# Patient Record
Sex: FEMALE | Race: WHITE | NOT HISPANIC OR LATINO | Employment: UNEMPLOYED | URBAN - METROPOLITAN AREA
[De-identification: names, ages, dates, MRNs, and addresses within clinical notes are randomized per-mention and may not be internally consistent; named-entity substitution may affect disease eponyms.]

---

## 2019-03-20 ENCOUNTER — HISTORICAL (OUTPATIENT)
Dept: ADMINISTRATIVE | Facility: HOSPITAL | Age: 24
End: 2019-03-20

## 2019-03-20 LAB
ABS NEUT (OLG): 10.77 X10(3)/MCL (ref 2.1–9.2)
ALBUMIN SERPL-MCNC: 4.4 GM/DL (ref 3.4–5)
ALBUMIN/GLOB SERPL: 1.1 RATIO (ref 1.1–2)
ALP SERPL-CCNC: 87 UNIT/L (ref 45–117)
ALT SERPL-CCNC: 60 UNIT/L (ref 12–78)
AST SERPL-CCNC: 27 UNIT/L (ref 15–37)
BASOPHILS # BLD AUTO: 0.05 X10(3)/MCL
BASOPHILS NFR BLD AUTO: 0 %
BILIRUB SERPL-MCNC: 0.4 MG/DL (ref 0.2–1)
BILIRUBIN DIRECT+TOT PNL SERPL-MCNC: 0.1 MG/DL
BILIRUBIN DIRECT+TOT PNL SERPL-MCNC: 0.3 MG/DL
BUN SERPL-MCNC: 11 MG/DL (ref 7–18)
CALCIUM SERPL-MCNC: 9.3 MG/DL (ref 8.5–10.1)
CHLORIDE SERPL-SCNC: 105 MMOL/L (ref 98–107)
CHOLEST SERPL-MCNC: 118 MG/DL
CHOLEST/HDLC SERPL: 3.3 {RATIO} (ref 0–4.4)
CO2 SERPL-SCNC: 26 MMOL/L (ref 21–32)
CREAT SERPL-MCNC: 0.8 MG/DL (ref 0.6–1.3)
EOSINOPHIL # BLD AUTO: 0.07 X10(3)/MCL
EOSINOPHIL NFR BLD AUTO: 0 %
ERYTHROCYTE [DISTWIDTH] IN BLOOD BY AUTOMATED COUNT: 12.5 % (ref 11.5–14.5)
EST. AVERAGE GLUCOSE BLD GHB EST-MCNC: 117 MG/DL
GLOBULIN SER-MCNC: 3.9 GM/ML (ref 2.3–3.5)
GLUCOSE SERPL-MCNC: 148 MG/DL (ref 74–106)
HBA1C MFR BLD: 5.7 % (ref 4.2–6.3)
HCT VFR BLD AUTO: 43.5 % (ref 35–46)
HDLC SERPL-MCNC: 36 MG/DL
HGB BLD-MCNC: 14.2 GM/DL (ref 12–16)
IMM GRANULOCYTES # BLD AUTO: 0.1 10*3/UL
IMM GRANULOCYTES NFR BLD AUTO: 1 %
LDLC SERPL CALC-MCNC: 46 MG/DL (ref 0–130)
LYMPHOCYTES # BLD AUTO: 3.1 X10(3)/MCL
LYMPHOCYTES NFR BLD AUTO: 21 % (ref 13–40)
MCH RBC QN AUTO: 31.1 PG (ref 26–34)
MCHC RBC AUTO-ENTMCNC: 32.6 GM/DL (ref 31–37)
MCV RBC AUTO: 95.2 FL (ref 80–100)
MONOCYTES # BLD AUTO: 0.42 X10(3)/MCL
MONOCYTES NFR BLD AUTO: 3 % (ref 4–12)
NEUTROPHILS # BLD AUTO: 10.77 X10(3)/MCL
NEUTROPHILS NFR BLD AUTO: 74 X10(3)/MCL
PLATELET # BLD AUTO: 438 X10(3)/MCL (ref 130–400)
PMV BLD AUTO: 10.3 FL (ref 7.4–10.4)
POTASSIUM SERPL-SCNC: 4.2 MMOL/L (ref 3.5–5.1)
PROT SERPL-MCNC: 8.3 GM/DL (ref 6.4–8.2)
RBC # BLD AUTO: 4.57 X10(6)/MCL (ref 4–5.2)
SODIUM SERPL-SCNC: 139 MMOL/L (ref 136–145)
TRIGL SERPL-MCNC: 181 MG/DL
TSH SERPL-ACNC: 1.43 MIU/L (ref 0.36–3.74)
VLDLC SERPL CALC-MCNC: 36 MG/DL
WBC # SPEC AUTO: 14.5 X10(3)/MCL (ref 4.5–11)

## 2019-09-17 ENCOUNTER — HISTORICAL (OUTPATIENT)
Dept: ADMINISTRATIVE | Facility: HOSPITAL | Age: 24
End: 2019-09-17

## 2019-09-17 LAB
EST. AVERAGE GLUCOSE BLD GHB EST-MCNC: 123 MG/DL
HBA1C MFR BLD: 5.9 % (ref 4.2–6.3)
TSH SERPL-ACNC: 0.91 MIU/L (ref 0.36–3.74)

## 2021-07-02 ENCOUNTER — HISTORICAL (OUTPATIENT)
Dept: ADMINISTRATIVE | Facility: HOSPITAL | Age: 26
End: 2021-07-02

## 2021-07-02 LAB
ABS NEUT (OLG): 14.77 X10(3)/MCL (ref 2.1–9.2)
ALBUMIN SERPL-MCNC: 4 GM/DL (ref 3.5–5)
ALBUMIN/GLOB SERPL: 0.9 RATIO (ref 1.1–2)
ALP SERPL-CCNC: 113 UNIT/L (ref 40–150)
ALT SERPL-CCNC: 146 UNIT/L (ref 0–55)
ANISOCYTOSIS BLD QL SMEAR: NORMAL
AST SERPL-CCNC: 65 UNIT/L (ref 5–34)
BASOPHILS # BLD AUTO: 0.1 X10(3)/MCL (ref 0–0.2)
BASOPHILS NFR BLD AUTO: 1 %
BILIRUB SERPL-MCNC: 0.3 MG/DL
BILIRUBIN DIRECT+TOT PNL SERPL-MCNC: 0.1 MG/DL (ref 0–0.5)
BILIRUBIN DIRECT+TOT PNL SERPL-MCNC: 0.2 MG/DL (ref 0–0.8)
BUN SERPL-MCNC: 8.3 MG/DL (ref 7–18.7)
CALCIUM SERPL-MCNC: 10 MG/DL (ref 8.4–10.2)
CHLORIDE SERPL-SCNC: 102 MMOL/L (ref 98–107)
CO2 SERPL-SCNC: 21 MMOL/L (ref 22–29)
CREAT SERPL-MCNC: 0.79 MG/DL (ref 0.55–1.02)
EOSINOPHIL # BLD AUTO: 0.5 X10(3)/MCL (ref 0–0.9)
EOSINOPHIL NFR BLD AUTO: 3 %
ERYTHROCYTE [DISTWIDTH] IN BLOOD BY AUTOMATED COUNT: 12.9 % (ref 11.5–14.5)
GLOBULIN SER-MCNC: 4.3 GM/DL (ref 2.4–3.5)
GLUCOSE SERPL-MCNC: 304 MG/DL (ref 74–100)
HCT VFR BLD AUTO: 43.9 % (ref 35–46)
HGB BLD-MCNC: 14.3 GM/DL (ref 12–16)
IMM GRANULOCYTES # BLD AUTO: 0.25 10*3/UL
IMM GRANULOCYTES NFR BLD AUTO: 1 %
LYMPHOCYTES # BLD AUTO: 2.8 X10(3)/MCL (ref 0.6–4.6)
LYMPHOCYTES NFR BLD AUTO: 14 %
MCH RBC QN AUTO: 30.3 PG (ref 26–34)
MCHC RBC AUTO-ENTMCNC: 32.6 GM/DL (ref 31–37)
MCV RBC AUTO: 93 FL (ref 80–100)
MONOCYTES # BLD AUTO: 1.2 X10(3)/MCL (ref 0.1–1.3)
MONOCYTES NFR BLD AUTO: 6 %
NEUTROPHILS # BLD AUTO: 14.77 X10(3)/MCL (ref 2.1–9.2)
NEUTROPHILS NFR BLD AUTO: 75 %
NRBC BLD AUTO-RTO: 0 % (ref 0–0.2)
PLATELET # BLD AUTO: 318 X10(3)/MCL (ref 130–400)
PLATELET # BLD EST: ADEQUATE 10*3/UL
PMV BLD AUTO: 10.9 FL (ref 7.4–10.4)
POTASSIUM SERPL-SCNC: 4.4 MMOL/L (ref 3.5–5.1)
PROT SERPL-MCNC: 8.3 GM/DL (ref 6.4–8.3)
RBC # BLD AUTO: 4.72 X10(6)/MCL (ref 4–5.2)
SODIUM SERPL-SCNC: 137 MMOL/L (ref 136–145)
TSH SERPL-ACNC: 0.76 UIU/ML (ref 0.35–4.94)
WBC # SPEC AUTO: 19.6 X10(3)/MCL (ref 4.5–11)

## 2021-10-12 ENCOUNTER — HISTORICAL (OUTPATIENT)
Dept: ADMINISTRATIVE | Facility: HOSPITAL | Age: 26
End: 2021-10-12

## 2021-10-12 LAB
ABS NEUT (OLG): 9.21 X10(3)/MCL (ref 2.1–9.2)
ALBUMIN SERPL-MCNC: 4.2 GM/DL (ref 3.5–5)
ALBUMIN/GLOB SERPL: 1.1 RATIO (ref 1.1–2)
ALP SERPL-CCNC: 106 UNIT/L (ref 40–150)
ALT SERPL-CCNC: 110 UNIT/L (ref 0–55)
AST SERPL-CCNC: 71 UNIT/L (ref 5–34)
BASOPHILS # BLD AUTO: 0.1 X10(3)/MCL (ref 0–0.2)
BASOPHILS NFR BLD AUTO: 0 %
BILIRUB SERPL-MCNC: 0.4 MG/DL
BILIRUBIN DIRECT+TOT PNL SERPL-MCNC: 0.2 MG/DL (ref 0–0.5)
BILIRUBIN DIRECT+TOT PNL SERPL-MCNC: 0.2 MG/DL (ref 0–0.8)
BUN SERPL-MCNC: 9.1 MG/DL (ref 7–18.7)
CALCIUM SERPL-MCNC: 10 MG/DL (ref 8.4–10.2)
CHLORIDE SERPL-SCNC: 103 MMOL/L (ref 98–107)
CHOLEST SERPL-MCNC: 104 MG/DL
CHOLEST/HDLC SERPL: 4 {RATIO} (ref 0–5)
CO2 SERPL-SCNC: 24 MMOL/L (ref 22–29)
CREAT SERPL-MCNC: 0.98 MG/DL (ref 0.55–1.02)
EOSINOPHIL # BLD AUTO: 0.5 X10(3)/MCL (ref 0–0.9)
EOSINOPHIL NFR BLD AUTO: 4 %
ERYTHROCYTE [DISTWIDTH] IN BLOOD BY AUTOMATED COUNT: 12.6 % (ref 11.5–14.5)
GLOBULIN SER-MCNC: 3.8 GM/DL (ref 2.4–3.5)
GLUCOSE SERPL-MCNC: 294 MG/DL (ref 74–100)
HCT VFR BLD AUTO: 43.7 % (ref 35–46)
HDLC SERPL-MCNC: 26 MG/DL (ref 35–60)
HGB BLD-MCNC: 14.5 GM/DL (ref 12–16)
IMM GRANULOCYTES # BLD AUTO: 0.08 10*3/UL
IMM GRANULOCYTES NFR BLD AUTO: 1 %
LDLC SERPL CALC-MCNC: 33 MG/DL (ref 50–140)
LYMPHOCYTES # BLD AUTO: 2.7 X10(3)/MCL (ref 0.6–4.6)
LYMPHOCYTES NFR BLD AUTO: 21 %
MCH RBC QN AUTO: 31.1 PG (ref 26–34)
MCHC RBC AUTO-ENTMCNC: 33.2 GM/DL (ref 31–37)
MCV RBC AUTO: 93.8 FL (ref 80–100)
MONOCYTES # BLD AUTO: 0.6 X10(3)/MCL (ref 0.1–1.3)
MONOCYTES NFR BLD AUTO: 4 %
NEUTROPHILS # BLD AUTO: 9.21 X10(3)/MCL (ref 2.1–9.2)
NEUTROPHILS NFR BLD AUTO: 70 %
NRBC BLD AUTO-RTO: 0 % (ref 0–0.2)
PLATELET # BLD AUTO: 517 X10(3)/MCL (ref 130–400)
PMV BLD AUTO: 9.9 FL (ref 7.4–10.4)
POTASSIUM SERPL-SCNC: 4.5 MMOL/L (ref 3.5–5.1)
PROT SERPL-MCNC: 8 GM/DL (ref 6.4–8.3)
RBC # BLD AUTO: 4.66 X10(6)/MCL (ref 4–5.2)
SODIUM SERPL-SCNC: 138 MMOL/L (ref 136–145)
TRIGL SERPL-MCNC: 227 MG/DL (ref 37–140)
TSH SERPL-ACNC: 2.55 UIU/ML (ref 0.35–4.94)
VLDLC SERPL CALC-MCNC: 45 MG/DL
WBC # SPEC AUTO: 13.1 X10(3)/MCL (ref 4.5–11)

## 2022-04-11 ENCOUNTER — HISTORICAL (OUTPATIENT)
Dept: ADMINISTRATIVE | Facility: HOSPITAL | Age: 27
End: 2022-04-11
Payer: MEDICARE

## 2022-04-27 VITALS
WEIGHT: 115.94 LBS | HEIGHT: 57 IN | DIASTOLIC BLOOD PRESSURE: 86 MMHG | SYSTOLIC BLOOD PRESSURE: 150 MMHG | BODY MASS INDEX: 25.01 KG/M2

## 2022-05-31 PROBLEM — E04.2 MULTINODULAR GOITER: Status: ACTIVE | Noted: 2022-05-31

## 2022-05-31 PROBLEM — E89.0 POSTSURGICAL HYPOTHYROIDISM: Status: ACTIVE | Noted: 2022-05-31

## 2022-05-31 PROBLEM — R79.89 ABNORMAL LIVER FUNCTION TESTS: Status: ACTIVE | Noted: 2022-05-31

## 2022-05-31 PROBLEM — E13.9 POST-TRANSPLANT DIABETES MELLITUS: Status: ACTIVE | Noted: 2022-05-31

## 2022-05-31 PROBLEM — D72.829 LEUKOCYTOSIS: Status: ACTIVE | Noted: 2022-05-31

## 2022-05-31 PROBLEM — C73 THYROID CANCER: Status: ACTIVE | Noted: 2022-05-31

## 2022-09-21 RX ORDER — GLIPIZIDE 5 MG/1
10 TABLET ORAL 2 TIMES DAILY WITH MEALS
Qty: 120 TABLET | Refills: 1 | Status: ON HOLD | OUTPATIENT
Start: 2022-09-21 | End: 2024-01-26 | Stop reason: HOSPADM

## 2022-09-21 RX ORDER — GLIPIZIDE 5 MG/1
TABLET ORAL
COMMUNITY
Start: 2022-06-21 | End: 2022-09-21 | Stop reason: SDUPTHER

## 2022-09-27 DIAGNOSIS — R79.89 ABNORMAL LIVER FUNCTION TESTS: ICD-10-CM

## 2022-09-27 DIAGNOSIS — E89.0 POSTSURGICAL HYPOTHYROIDISM: ICD-10-CM

## 2022-09-27 DIAGNOSIS — Z85.850 HISTORY OF PAPILLARY ADENOCARCINOMA OF THYROID: ICD-10-CM

## 2022-09-27 DIAGNOSIS — E04.2 MULTINODULAR GOITER: ICD-10-CM

## 2022-09-27 DIAGNOSIS — E13.9 POST-TRANSPLANT DIABETES MELLITUS: Primary | ICD-10-CM

## 2022-09-27 RX ORDER — LEVOTHYROXINE SODIUM 112 UG/1
56 TABLET ORAL DAILY
Qty: 45 TABLET | Refills: 0 | Status: SHIPPED | OUTPATIENT
Start: 2022-09-27 | End: 2023-01-04 | Stop reason: SDUPTHER

## 2022-09-27 RX ORDER — LEVOTHYROXINE SODIUM 112 UG/1
56 TABLET ORAL DAILY
COMMUNITY
Start: 2022-06-03 | End: 2022-09-27 | Stop reason: SDUPTHER

## 2022-09-27 NOTE — TELEPHONE ENCOUNTER
Spoke to Mrs Silva and informed erx was signed, and pt will need labs and the u/s prior to the appointment. Voiced understanding.

## 2022-09-27 NOTE — TELEPHONE ENCOUNTER
----- Message from Ilana May sent at 9/27/2022  8:50 AM CDT -----  Regarding: Refill/Ultrasound  #DR STOUT/ROSALBA  Pt mother called clinic to request refill of LEVOTHORYIOD be sent to [facility] [place] - [address].  Cg also request orders for ultrasound of thyroid.  Please call Shira Velásquez (mom/Cg) @ [phone] upon completion.

## 2022-10-31 ENCOUNTER — HOSPITAL ENCOUNTER (OUTPATIENT)
Dept: RADIOLOGY | Facility: HOSPITAL | Age: 27
Discharge: HOME | End: 2022-10-31
Attending: INTERNAL MEDICINE
Payer: MEDICARE

## 2022-10-31 DIAGNOSIS — Z85.850 HISTORY OF PAPILLARY ADENOCARCINOMA OF THYROID: ICD-10-CM

## 2022-10-31 DIAGNOSIS — E89.0 POSTSURGICAL HYPOTHYROIDISM: ICD-10-CM

## 2022-10-31 PROCEDURE — 76536 US EXAM OF HEAD AND NECK: CPT | Mod: TC

## 2022-11-02 ENCOUNTER — TELEPHONE (OUTPATIENT)
Dept: ENDOCRINOLOGY | Facility: CLINIC | Age: 27
End: 2022-11-02
Payer: MEDICARE

## 2022-11-02 NOTE — TELEPHONE ENCOUNTER
Recommend adding her to the cancellation list to sort results and ongoing care.  U/s result is in her mychart if she wants to review now.

## 2022-11-02 NOTE — TELEPHONE ENCOUNTER
Phoned left voice message regarding placing patient on cancellation list and that ultrasound can be viewed on her portal.

## 2022-11-02 NOTE — TELEPHONE ENCOUNTER
----- Message from Giovana Salinas sent at 11/1/2022  2:21 PM CDT -----  Regarding: General Message  #Stout#    I called to r/s the patient's appointment her mother (Shira) stated that the patient has a virus and she could not come in. Her mother stated that she had her Ultrasound done on yesterday and she wants to know if her results are in. [phone]

## 2022-11-25 RX ORDER — LINAGLIPTIN 5 MG/1
5 TABLET, FILM COATED ORAL DAILY
COMMUNITY
Start: 2022-08-24 | End: 2022-11-25 | Stop reason: SDUPTHER

## 2022-11-26 RX ORDER — LINAGLIPTIN 5 MG/1
5 TABLET, FILM COATED ORAL DAILY
Qty: 30 TABLET | Refills: 3 | Status: SHIPPED | OUTPATIENT
Start: 2022-11-26 | End: 2023-04-03 | Stop reason: SDUPTHER

## 2022-12-20 RX ORDER — LEVOTHYROXINE SODIUM 112 UG/1
112 TABLET ORAL
Qty: 30 TABLET | Refills: 11 | OUTPATIENT
Start: 2022-12-20 | End: 2023-12-20

## 2022-12-20 NOTE — LETTER
December 28, 2022    Chiquis Velásquez  [address]  [facility] [address]             [facility] Endocrin[facility]  [address] [facility] [address]  Phone: [phone] Dear . / Mrs. Gomesaux,    We are writing you because our efforts to reach you by telephone have been unsuccessful.    Please contact our clinic at  at your earliest convenience.    We look forward to hearing from you soon.    Sincerely,  [facility]

## 2022-12-20 NOTE — TELEPHONE ENCOUNTER
Received refill request for Levothyroxine 112mcg half a tablet daily.    [facility] - 7/27/23  [facility] - 2/15/22  No show - 6/1/22  Missed/cancelled - 11/3/22  Ultra sound thyroid 10/31/22

## 2022-12-20 NOTE — TELEPHONE ENCOUNTER
Denied, pt overdue for an appt and has no showed and repeatedly canceled and now is booked out to the middle of next year.  Will need to go to urgent care vs PCP to be evaluated and we can  where we let off next visit.

## 2023-01-04 ENCOUNTER — TELEPHONE (OUTPATIENT)
Dept: ENDOCRINOLOGY | Facility: CLINIC | Age: 28
End: 2023-01-04
Payer: MEDICARE

## 2023-01-04 DIAGNOSIS — E89.0 POSTSURGICAL HYPOTHYROIDISM: ICD-10-CM

## 2023-01-04 DIAGNOSIS — Z85.850 HISTORY OF PAPILLARY ADENOCARCINOMA OF THYROID: ICD-10-CM

## 2023-01-04 RX ORDER — LEVOTHYROXINE SODIUM 112 UG/1
56 TABLET ORAL DAILY
Qty: 2 TABLET | Refills: 0 | Status: SHIPPED | OUTPATIENT
Start: 2023-01-04 | End: 2023-01-18

## 2023-01-04 NOTE — TELEPHONE ENCOUNTER
Received call from pharmacy regarding refill on Levothyroxine 112 mcg, explained of need to be seen, Mom called and scheduled appointment for Friday.  Pharmacy requesting refill, explained will need to be seen prior to refill, he requested I double check.  Do you want to send in enough to last till Friday?  Snip it from pharmacy call attached.

## 2023-01-06 ENCOUNTER — TELEPHONE (OUTPATIENT)
Dept: ENDOCRINOLOGY | Facility: CLINIC | Age: 28
End: 2023-01-06

## 2023-01-06 ENCOUNTER — OFFICE VISIT (OUTPATIENT)
Dept: ENDOCRINOLOGY | Facility: CLINIC | Age: 28
End: 2023-01-06
Payer: MEDICARE

## 2023-01-06 ENCOUNTER — LAB VISIT (OUTPATIENT)
Dept: LAB | Facility: HOSPITAL | Age: 28
End: 2023-01-06
Attending: STUDENT IN AN ORGANIZED HEALTH CARE EDUCATION/TRAINING PROGRAM
Payer: MEDICARE

## 2023-01-06 VITALS
SYSTOLIC BLOOD PRESSURE: 138 MMHG | BODY MASS INDEX: 24.81 KG/M2 | WEIGHT: 115 LBS | HEIGHT: 57 IN | RESPIRATION RATE: 21 BRPM | DIASTOLIC BLOOD PRESSURE: 74 MMHG

## 2023-01-06 DIAGNOSIS — C73 THYROID CANCER: ICD-10-CM

## 2023-01-06 DIAGNOSIS — E04.2 MULTINODULAR GOITER: ICD-10-CM

## 2023-01-06 DIAGNOSIS — Z85.850 HISTORY OF PAPILLARY ADENOCARCINOMA OF THYROID: ICD-10-CM

## 2023-01-06 DIAGNOSIS — E13.9 POST-TRANSPLANT DIABETES MELLITUS: ICD-10-CM

## 2023-01-06 DIAGNOSIS — E89.0 POSTSURGICAL HYPOTHYROIDISM: ICD-10-CM

## 2023-01-06 DIAGNOSIS — E13.9 POST-TRANSPLANT DIABETES MELLITUS: Primary | ICD-10-CM

## 2023-01-06 LAB
ALBUMIN SERPL-MCNC: 4.3 G/DL (ref 3.5–5)
ALBUMIN/GLOB SERPL: 1.3 RATIO (ref 1.1–2)
ALP SERPL-CCNC: 85 UNIT/L (ref 40–150)
ALT SERPL-CCNC: 61 UNIT/L (ref 0–55)
AST SERPL-CCNC: 32 UNIT/L (ref 5–34)
BASOPHILS # BLD AUTO: 0.07 X10(3)/MCL (ref 0–0.2)
BASOPHILS NFR BLD AUTO: 0.3 %
BILIRUBIN DIRECT+TOT PNL SERPL-MCNC: 0.7 MG/DL
BUN SERPL-MCNC: 11.5 MG/DL (ref 7–18.7)
CALCIUM SERPL-MCNC: 9.7 MG/DL (ref 8.4–10.2)
CHLORIDE SERPL-SCNC: 100 MMOL/L (ref 98–107)
CHOLEST SERPL-MCNC: 92 MG/DL
CHOLEST/HDLC SERPL: 4 {RATIO} (ref 0–5)
CO2 SERPL-SCNC: 24 MMOL/L (ref 22–29)
CREAT SERPL-MCNC: 1 MG/DL (ref 0.55–1.02)
EOSINOPHIL # BLD AUTO: 0.02 X10(3)/MCL (ref 0–0.9)
EOSINOPHIL NFR BLD AUTO: 0.1 %
ERYTHROCYTE [DISTWIDTH] IN BLOOD BY AUTOMATED COUNT: 12.4 % (ref 11–14.5)
GFR SERPLBLD CREATININE-BSD FMLA CKD-EPI: 80 MLS/MIN/1.73/M2
GLOBULIN SER-MCNC: 3.4 GM/DL (ref 2.4–3.5)
GLUCOSE SERPL-MCNC: 328 MG/DL (ref 74–100)
HBA1C MFR BLD: 8.8 %
HCT VFR BLD AUTO: 41.6 % (ref 37–47)
HDLC SERPL-MCNC: 25 MG/DL (ref 35–60)
HGB BLD-MCNC: 14.1 GM/DL (ref 12–16)
IMM GRANULOCYTES # BLD AUTO: 0.17 X10(3)/MCL (ref 0–0.04)
IMM GRANULOCYTES NFR BLD AUTO: 0.8 %
LDLC SERPL CALC-MCNC: 17 MG/DL (ref 50–140)
LYMPHOCYTES # BLD AUTO: 2.37 X10(3)/MCL (ref 0.6–4.6)
LYMPHOCYTES NFR BLD AUTO: 11.2 %
MCH RBC QN AUTO: 30.7 PG
MCHC RBC AUTO-ENTMCNC: 33.9 MG/DL (ref 33–36)
MCV RBC AUTO: 90.6 FL (ref 80–94)
MONOCYTES # BLD AUTO: 0.55 X10(3)/MCL (ref 0.1–1.3)
MONOCYTES NFR BLD AUTO: 2.6 %
NEUTROPHILS # BLD AUTO: 17.96 X10(3)/MCL (ref 2.1–9.2)
NEUTROPHILS NFR BLD AUTO: 85 %
NRBC BLD AUTO-RTO: 0 % (ref 0–1)
PLATELET # BLD AUTO: 482 X10(3)/MCL (ref 140–371)
PMV BLD AUTO: 9.9 FL (ref 9.4–12.4)
POTASSIUM SERPL-SCNC: 4.4 MMOL/L (ref 3.5–5.1)
PROT SERPL-MCNC: 7.7 GM/DL (ref 6.4–8.3)
RBC # BLD AUTO: 4.59 X10(6)/MCL (ref 4.2–5.4)
SODIUM SERPL-SCNC: 136 MMOL/L (ref 136–145)
T4 FREE SERPL-MCNC: 0.97 NG/DL (ref 0.7–1.48)
T4 SERPL-MCNC: 9.72 UG/DL (ref 4.87–11.72)
TRIGL SERPL-MCNC: 249 MG/DL (ref 37–140)
TSH SERPL-ACNC: 2.82 UIU/ML (ref 0.35–4.94)
VLDLC SERPL CALC-MCNC: 50 MG/DL
WBC # SPEC AUTO: 21.1 X10(3)/MCL (ref 4.5–11.5)

## 2023-01-06 PROCEDURE — 80053 COMPREHEN METABOLIC PANEL: CPT

## 2023-01-06 PROCEDURE — 85025 COMPLETE CBC W/AUTO DIFF WBC: CPT

## 2023-01-06 PROCEDURE — 84443 ASSAY THYROID STIM HORMONE: CPT

## 2023-01-06 PROCEDURE — 84439 ASSAY OF FREE THYROXINE: CPT

## 2023-01-06 PROCEDURE — 84432 ASSAY OF THYROGLOBULIN: CPT

## 2023-01-06 PROCEDURE — 99214 OFFICE O/P EST MOD 30 MIN: CPT | Mod: PBBFAC

## 2023-01-06 PROCEDURE — 83036 HEMOGLOBIN GLYCOSYLATED A1C: CPT | Mod: PBBFAC

## 2023-01-06 PROCEDURE — 84436 ASSAY OF TOTAL THYROXINE: CPT | Mod: XB

## 2023-01-06 PROCEDURE — 86376 MICROSOMAL ANTIBODY EACH: CPT

## 2023-01-06 PROCEDURE — 84480 ASSAY TRIIODOTHYRONINE (T3): CPT

## 2023-01-06 PROCEDURE — 36415 COLL VENOUS BLD VENIPUNCTURE: CPT

## 2023-01-06 PROCEDURE — 80061 LIPID PANEL: CPT

## 2023-01-06 RX ORDER — MYCOPHENOLATE MOFETIL 200 MG/ML
POWDER, FOR SUSPENSION ORAL
COMMUNITY
Start: 2022-03-09 | End: 2024-02-07 | Stop reason: SDUPTHER

## 2023-01-06 RX ORDER — FLUTICASONE PROPIONATE 44 UG/1
AEROSOL, METERED RESPIRATORY (INHALATION)
COMMUNITY
Start: 2022-09-14

## 2023-01-06 RX ORDER — ATORVASTATIN CALCIUM 20 MG/1
20 TABLET, FILM COATED ORAL NIGHTLY
COMMUNITY
Start: 2022-11-23

## 2023-01-06 RX ORDER — MUPIROCIN 20 MG/G
OINTMENT TOPICAL
COMMUNITY
Start: 2022-10-21

## 2023-01-06 RX ORDER — AMLODIPINE BESYLATE 5 MG/1
5 TABLET ORAL
COMMUNITY
Start: 2023-01-03

## 2023-01-06 RX ORDER — METOPROLOL TARTRATE 25 MG/1
TABLET, FILM COATED ORAL
COMMUNITY
Start: 2022-10-25

## 2023-01-06 RX ORDER — PETROLATUM,WHITE 41 %
OINTMENT (GRAM) TOPICAL
COMMUNITY

## 2023-01-06 RX ORDER — IPRATROPIUM BROMIDE 42 UG/1
2 SPRAY, METERED NASAL
COMMUNITY
Start: 2022-12-27

## 2023-01-06 RX ORDER — KETOCONAZOLE 20 MG/ML
SHAMPOO, SUSPENSION TOPICAL
COMMUNITY

## 2023-01-06 RX ORDER — ONDANSETRON HYDROCHLORIDE 4 MG/5ML
8 SOLUTION ORAL
COMMUNITY

## 2023-01-06 RX ORDER — CETIRIZINE HYDROCHLORIDE 1 MG/ML
10 SOLUTION ORAL
COMMUNITY
Start: 2022-12-13

## 2023-01-06 RX ORDER — FOLIC ACID 1 MG/1
1000 TABLET ORAL
COMMUNITY
Start: 2022-12-07

## 2023-01-06 RX ORDER — LEVOCARNITINE 1 G/10ML
500 SOLUTION ORAL 2 TIMES DAILY
COMMUNITY
Start: 2022-12-07

## 2023-01-06 RX ORDER — HYOSCYAMINE SULFATE 0.12 MG/1
0.12 TABLET, ORALLY DISINTEGRATING ORAL 2 TIMES DAILY PRN
COMMUNITY
Start: 2022-09-16

## 2023-01-06 RX ORDER — PREDNISOLONE SODIUM PHOSPHATE 15 MG/5ML
7.5 SOLUTION ORAL
COMMUNITY
Start: 2022-11-18

## 2023-01-06 RX ORDER — TACROLIMUS 0.5 MG/1
CAPSULE ORAL
COMMUNITY
Start: 2022-04-07

## 2023-01-06 RX ORDER — PEN NEEDLE, DIABETIC 30 GX3/16"
NEEDLE, DISPOSABLE MISCELLANEOUS
COMMUNITY
Start: 2022-11-10

## 2023-01-06 RX ORDER — INSULIN GLARGINE 100 [IU]/ML
10 INJECTION, SOLUTION SUBCUTANEOUS NIGHTLY
Status: ON HOLD | COMMUNITY
Start: 2022-11-10 | End: 2024-01-26 | Stop reason: HOSPADM

## 2023-01-06 RX ORDER — LANCETS 28 GAUGE
EACH MISCELLANEOUS
COMMUNITY
Start: 2022-11-10

## 2023-01-06 RX ORDER — LANSOPRAZOLE 30 MG/1
TABLET, ORALLY DISINTEGRATING, DELAYED RELEASE ORAL
COMMUNITY
Start: 2022-09-22

## 2023-01-06 RX ORDER — ASPIRIN 325 MG
325 TABLET ORAL
COMMUNITY

## 2023-01-06 NOTE — PROGRESS NOTES
[facility] Endocrinology  CLINIC NOTE    Patient Name: Chiquis Velásquez  YOB: 1995  Chief Complaint: Diabetes and Hypothyroidism     PRESENTING HISTORY   History of Present Illness:  Ms. Chiquis Velásquez is a 27 y.o. 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    Today, CBG logs with blood sugars ranging from 200-460. POC A1c of 8.8 today from 7.4. Currently on glipizide 10mg BID, tradjenta 5mg daily and 10 units of lantus at bedtime if CBG >400. Mother states that sugars are usually high in the mornings and improve in the evenings. Pt does receive continuous tube feeds at night with no recent changes to frequency or formula.     US thyroid on 10/31/22 with small left lobe nodule at 2.4 x 1.2 x 1.1cm and hypoechoic left lobe nodule. Denies     Review of Systems:  Unable to fully assess due to patient's mental status.     PAST HISTORY:       Social History     Socioeconomic History    Marital status: Single   Tobacco Use    Smoking status: Never    Smokeless tobacco: Never   Substance and Sexual Activity    Alcohol use: Never    Drug use: Never    Sexual activity: Never       MEDICATIONS:     Current Outpatient Medications   Medication Instructions    amLODIPine (NORVASC) 5 mg, Oral    aspirin 325 mg, Per G Tube    atorvastatin (LIPITOR) 20 mg, Oral, Nightly    BASAGLAR KWIKPEN U-100 INSULIN 10 Units, Subcutaneous, Nightly    cetirizine (ZYRTEC) 10 mg, Oral    FLOVENT HFA 44 mcg/actuation inhaler Inhalation    folic acid (FOLVITE) 1,000 mcg, Oral    glipiZIDE (GLUCOTROL) 10 mg, Oral, 2 times daily with meals    ipratropium (ATROVENT) 42 mcg (0.06 %) nasal spray 2 sprays, Each Nostril    ketoconazole (NIZORAL) 2 % shampoo Topical, Three times weekly    lansoprazole (PREVACID SOLUTAB) 30 MG disintegrating tablet Oral     "levocarnitine (CARNITOR) 100 mg/mL Soln 500 mg, Oral, 2 times daily    levothyroxine (SYNTHROID) 56 mcg, Oral, Daily    LEVSIN/SL 0.125 mg, Oral, 2 times daily PRN    metoprolol tartrate (LOPRESSOR) 25 MG tablet TAKE ONE TABLET BY MOUTH IN THE MORNING AND ONE TABLET BEFORE BEDTIME    mupirocin (BACTROBAN) 2 % ointment APPLY TOPICALLY THREE TIMES DAILY AS NEEDED FOR REDNESS AT PEG SITE    mycophenolate mofetil (CELLCEPT) 200 mg/mL SusR TAKE 2.5 MLS VIA PEG TWICE DAILY    ondansetron (ZOFRAN) 8 mg, Per G Tube    pen needle, diabetic 31 gauge x 3/16" Ndle Use daily    prednisoLONE (ORAPRED) 15 mg, Oral    tacrolimus (PROGRAF) 0.5 MG Cap take 2 tabs q am every MON, TUES, WED  TAKE 1 TAB Q PM ON MON, TUE, WED  TAKE 1 TAB BID Q TH, FR, SA & SUN    TRADJENTA 5 mg, Oral, Daily    TRUEPLUS PEN NEEDLE 31 gauge x 3/16" Ndle USE DAILY AS DIRECTED    white petrolatum (AQUAPHOR HEALING) 41 % Oint Topical        OBJECTIVE:   Vital Signs:  Vitals:    01/06/23 0755   BP: 138/74   Resp: (!) 21   Weight: 52.2 kg (115 lb)   Height: 4' 9" (1.448 m)        Physical Exam:  General: Non verbal in wheelchair. No acute distress  HEENT: Normocephalic, atraumatic. Face symmetric.  Cardiovascular: Regular rate & rhythm, Normal S1 & S2 w/out murmurs, rubs or gallops  Pulmonary:  On aBilateral symmetric chest rise, Non-labored  Abdominal:  Soft nondistended  Extremities: No clubbing, cyanosis or edema.  Skin:  Exposed skin is warm & dry.  Neuro:   Patient moves all extremities. Sensation intact bilateraly.      ASSESSMENT & PLAN:   History of papillary cell carcinoma s/p hemithyroidectomy  Post surgical hypothyroidism   -Continue Levothyroxine 56mcg daily  -Thyroid US done on  10/31/22 stable; rpt in 1 year  -Ordered CBC, CMP, thyroglobulin antibodies, TSH      Type 2 diabetes mellitus   -CBG log shows range from 200-460 with higher sugars in the morning and afternoon   -Pt is on continuous tube feeds at night and fasting throughout the day. "   -Currently on Glipizide 10mg BID,Tradjenta 5mg daily, and PRN lantus 10 units at night for CBG > 400.  -Metformin previously d/c'd due to h/o renal transplant.   -Recommend that patient continue current DM GDMT with the follow adjustments: Take glipizide in the evening starting with 1.5 tabs then increasing to 2 tabs. Take Tradjenta in the evening. Take lantus 10 units in the evening consistently instead of PRN and increase by 1 unit nightly with goal fasting CBG of < 130.  -POC A1C of 8.8 today        HLD (hyperlipidemia)   -ordered rpt lipid panel    -currently on Lipitor 20mg daily    RTC in 4-6 months    Marshal Zambrano MD  PGY-3, Internal Medicine    Staff addendum to follow.

## 2023-01-06 NOTE — TELEPHONE ENCOUNTER
Patient's mom returned call, informed that white count is elevated and the highest it has been in the past few draws along w/ elevated neutrophils.  Given their concern about infection, cannot rule it out and recommend either being seen today by PCP or going to urgent care.  She states she will bring Chiquis to the ER.

## 2023-01-06 NOTE — TELEPHONE ENCOUNTER
Phoned Mom (sarkis) at , dad (altagracia) at , and the home number .  Left messages at all three numbers regarding the WBC being elevated and need to be seen by PCP or Urgent Care.  Asked for call back provided clinic number, will continue to reach patient's family.

## 2023-01-06 NOTE — TELEPHONE ENCOUNTER
Labs are coming in, while awaiting the rest, ok to notify that white count is elevated and the highest it has been in the past few draws along w/ elevated neutrophils.  Given their concern about infection, cannot rule it out and recommend either being seen today by PCP or going to urgent care.  Ok to fax a copy of CBC over to PCP as well to this end.

## 2023-01-09 LAB
ENDOCRINOLOGIST REVIEW: ABNORMAL
T3 SERPL IA-MCNC: 179 NG/DL (ref 80–200)
THYROGLOB AB SERPL IA-ACNC: <1.8 IU/ML
THYROGLOB SERPL-MCNC: 7.5 NG/ML
THYROPEROXIDASE AB SERPL-ACNC: 1 IU/ML

## 2023-01-09 NOTE — TELEPHONE ENCOUNTER
Pt went to [facility] ER, Hospital Encounter notes available on chart review 01/06/2023.

## 2023-01-11 NOTE — TELEPHONE ENCOUNTER
Phoned and left voice message for mom (sarkis) letting her know we were calling to check on Chiquis and asked her to contact clinic, provided with phone number.

## 2023-01-11 NOTE — TELEPHONE ENCOUNTER
I cant tell from epic what happened/was changed, please clarify with the family then will provide further recs.

## 2023-01-13 ENCOUNTER — TELEPHONE (OUTPATIENT)
Dept: ENDOCRINOLOGY | Facility: CLINIC | Age: 28
End: 2023-01-13
Payer: MEDICARE

## 2023-01-13 DIAGNOSIS — Z85.850 HISTORY OF PAPILLARY ADENOCARCINOMA OF THYROID: ICD-10-CM

## 2023-01-13 DIAGNOSIS — E13.9 POST-TRANSPLANT DIABETES MELLITUS: Primary | ICD-10-CM

## 2023-01-13 DIAGNOSIS — E89.0 POSTSURGICAL HYPOTHYROIDISM: ICD-10-CM

## 2023-01-13 RX ORDER — LEVOTHYROXINE SODIUM 112 UG/1
112 TABLET ORAL
Qty: 30 TABLET | Refills: 11 | Status: CANCELLED | OUTPATIENT
Start: 2023-01-13 | End: 2024-01-13

## 2023-01-13 NOTE — TELEPHONE ENCOUNTER
Mom phoned states Chiquis is much better, her left ear is now clear, was discharged Sunday afternoon.Dr. Prater is monitoring her right ear.      Mom requesting refill on the [address]

## 2023-01-13 NOTE — TELEPHONE ENCOUNTER
Phoned patient's mom Shira at , left voice message letting her know I was calling to check up on kirstin and asked her to contact clinic.  Number provided.

## 2023-01-13 NOTE — TELEPHONE ENCOUNTER
----- Message from Ilana May sent at 1/12/2023 12:28 PM CST -----  Regarding: pt advice  Dr RAYMOND/ROSALBA Velásquez returning call. Please contact pt mother @  [phone]

## 2023-01-13 NOTE — TELEPHONE ENCOUNTER
----- Message from Magalys Hernandez sent at 1/13/2023 11:10 AM CST -----  Regarding: Office call  #STOROBBY Pinto from Dr. Campbell Prater office called needing pt last chart notes and labs. Pt is in their office now.     Office fax #: [phone]

## 2023-01-17 NOTE — TELEPHONE ENCOUNTER
Labs show her Lt4 dose was not at goal and otherwise that we should continue to follow her u/s findings and tg levels.    Make changes for DM as per visit, erx increased LT4 1/2 125mcg tab po daily up from current 1/2 112mcg tab, #45, 2 refills.      TSH, tg panel, hga1c prior to f/u.

## 2023-01-18 RX ORDER — LEVOTHYROXINE SODIUM 125 UG/1
TABLET ORAL
Qty: 45 TABLET | Refills: 2 | Status: SHIPPED | OUTPATIENT
Start: 2023-01-18 | End: 2023-10-23 | Stop reason: SDUPTHER

## 2023-01-19 NOTE — TELEPHONE ENCOUNTER
Left voice message letting patient's mom  know of dose increase and of need for labs prior to appointment.

## 2023-04-03 DIAGNOSIS — E13.9 POST-TRANSPLANT DIABETES MELLITUS: Primary | ICD-10-CM

## 2023-04-03 RX ORDER — LINAGLIPTIN 5 MG/1
5 TABLET, FILM COATED ORAL DAILY
Qty: 30 TABLET | Refills: 3 | Status: SHIPPED | OUTPATIENT
Start: 2023-04-03 | End: 2023-08-07 | Stop reason: SDUPTHER

## 2023-04-03 NOTE — TELEPHONE ENCOUNTER
Last visit in [facility] ENDOCRINOLOGY: 1/6/2023    Patient's next visit in [facility] ENDOCRINOLOGY: 7/6/2023     Refill request faxed from [facility].

## 2023-07-19 ENCOUNTER — TELEPHONE (OUTPATIENT)
Dept: ENDOCRINOLOGY | Facility: CLINIC | Age: 28
End: 2023-07-19
Payer: MEDICARE

## 2023-07-19 NOTE — TELEPHONE ENCOUNTER
----- Message from Chanda Whitaker sent at 7/19/2023  8:19 AM CDT -----  Regarding: Request from PCP  STOUT PT         Pt's PCP, Dr. Campbell Prater is requesting the patients most recent A1C.   Fax # [phone]

## 2023-08-07 DIAGNOSIS — E13.9 POST-TRANSPLANT DIABETES MELLITUS: ICD-10-CM

## 2023-08-07 RX ORDER — LINAGLIPTIN 5 MG/1
5 TABLET, FILM COATED ORAL DAILY
Qty: 30 TABLET | Refills: 3 | Status: SHIPPED | OUTPATIENT
Start: 2023-08-07 | End: 2023-12-04 | Stop reason: SDUPTHER

## 2023-08-07 NOTE — TELEPHONE ENCOUNTER
Last visit in [facility] ENDOCRINOLOGY: 1/26/23  Canceled by us: 7/6/23  Patient's next visit in [facility] ENDOCRINOLOGY: 10/27/2023

## 2023-10-02 ENCOUNTER — TELEPHONE (OUTPATIENT)
Dept: ENDOCRINOLOGY | Facility: CLINIC | Age: 28
End: 2023-10-02
Payer: MEDICARE

## 2023-10-02 NOTE — TELEPHONE ENCOUNTER
----- Message from Sasha Camejo sent at 10/2/2023  1:30 PM CDT -----  Patient of Dr Josh Aguilar with Dr Prater's office  requesting results of last A1C documented    (O) [phone]  (F) [phone]    Thanks

## 2023-10-23 DIAGNOSIS — E89.0 POSTSURGICAL HYPOTHYROIDISM: ICD-10-CM

## 2023-10-23 DIAGNOSIS — C73 THYROID CANCER: Primary | ICD-10-CM

## 2023-10-23 NOTE — TELEPHONE ENCOUNTER
Received refill request from pharmacy      Last visit in [facility] ENDOCRINOLOGY: 01/06/2023  Canceled by us: 07/06/23  Patient's next visit in [facility] ENDOCRINOLOGY: 10/27/2023

## 2023-10-24 RX ORDER — LEVOTHYROXINE SODIUM 125 UG/1
TABLET ORAL
Qty: 4 TABLET | Refills: 0 | Status: SHIPPED | OUTPATIENT
Start: 2023-10-24

## 2023-12-04 DIAGNOSIS — E13.9 POST-TRANSPLANT DIABETES MELLITUS: ICD-10-CM

## 2023-12-04 NOTE — TELEPHONE ENCOUNTER
Received refill request from pharmacy.       Last visit in [facility] ENDOCRINOLOGY: 01/06/2023  Canceled: 07/06/23, 07/27/23  No show: 10/27/2023  Patient's next visit in [facility] ENDOCRINOLOGY: Not scheduled.    headache, chest pain, htn and dizziness starting this morning

## 2023-12-07 RX ORDER — LINAGLIPTIN 5 MG/1
5 TABLET, FILM COATED ORAL DAILY
Qty: 30 TABLET | Refills: 0 | Status: SHIPPED | OUTPATIENT
Start: 2023-12-07 | End: 2024-02-07

## 2024-01-19 ENCOUNTER — OFFICE VISIT (OUTPATIENT)
Dept: ENDOCRINOLOGY | Facility: CLINIC | Age: 29
DRG: 638 | End: 2024-01-19
Payer: MEDICARE

## 2024-01-19 ENCOUNTER — TELEPHONE (OUTPATIENT)
Dept: ENDOCRINOLOGY | Facility: CLINIC | Age: 29
End: 2024-01-19

## 2024-01-19 ENCOUNTER — HOSPITAL ENCOUNTER (INPATIENT)
Facility: HOSPITAL | Age: 29
LOS: 1 days | Discharge: HOME | DRG: 638 | End: 2024-01-20
Attending: INTERNAL MEDICINE | Admitting: INTERNAL MEDICINE
Payer: MEDICARE

## 2024-01-19 VITALS
DIASTOLIC BLOOD PRESSURE: 84 MMHG | WEIGHT: 115 LBS | RESPIRATION RATE: 16 BRPM | BODY MASS INDEX: 24.89 KG/M2 | HEART RATE: 144 BPM | SYSTOLIC BLOOD PRESSURE: 132 MMHG

## 2024-01-19 DIAGNOSIS — E11.65 UNCONTROLLED TYPE 2 DIABETES MELLITUS WITH HYPERGLYCEMIA: Primary | ICD-10-CM

## 2024-01-19 DIAGNOSIS — R00.2 PALPITATIONS: ICD-10-CM

## 2024-01-19 DIAGNOSIS — R00.0 SINUS TACHYCARDIA: ICD-10-CM

## 2024-01-19 DIAGNOSIS — E13.9 POST-TRANSPLANT DIABETES MELLITUS: Primary | ICD-10-CM

## 2024-01-19 DIAGNOSIS — E03.9 HYPOTHYROIDISM (ACQUIRED): ICD-10-CM

## 2024-01-19 PROBLEM — E11.00 HYPEROSMOLAR HYPERGLYCEMIC STATE (HHS): Status: ACTIVE | Noted: 2024-01-19

## 2024-01-19 LAB
ALBUMIN SERPL-MCNC: 3.5 G/DL (ref 3.5–5)
ALBUMIN/GLOB SERPL: 0.9 RATIO (ref 1.1–2)
ALP SERPL-CCNC: 93 UNIT/L (ref 40–150)
ALT SERPL-CCNC: 31 UNIT/L (ref 0–55)
APPEARANCE UR: CLEAR
AST SERPL-CCNC: 35 UNIT/L (ref 5–34)
B-HCG UR QL: NEGATIVE
B-OH-BUTYR SERPL-MCNC: 0.9 MMOL/L
BACTERIA #/AREA URNS AUTO: ABNORMAL /HPF
BASE EXCESS BLD CALC-SCNC: 0.5 MMOL/L
BASOPHILS # BLD AUTO: 0.04 X10(3)/MCL
BASOPHILS NFR BLD AUTO: 0.2 %
BILIRUB SERPL-MCNC: 0.4 MG/DL
BILIRUB UR QL STRIP.AUTO: NEGATIVE
BLOOD GAS SAMPLE TYPE (OHS): ABNORMAL
BUN SERPL-MCNC: 14.4 MG/DL (ref 7–18.7)
CALCIUM SERPL-MCNC: 9.5 MG/DL (ref 8.4–10.2)
CHLORIDE SERPL-SCNC: 100 MMOL/L (ref 98–107)
CO2 BLDA-SCNC: 25.7 MMOL/L
CO2 SERPL-SCNC: 20 MMOL/L (ref 22–29)
COHGB MFR BLDA: 3.7 %
COLOR UR AUTO: COLORLESS
CREAT SERPL-MCNC: 0.97 MG/DL (ref 0.55–1.02)
CTP QC/QA: YES
EOSINOPHIL # BLD AUTO: 0 X10(3)/MCL (ref 0–0.9)
EOSINOPHIL NFR BLD AUTO: 0 %
ERYTHROCYTE [DISTWIDTH] IN BLOOD BY AUTOMATED COUNT: 12.1 % (ref 11.5–17)
GFR SERPLBLD CREATININE-BSD FMLA CKD-EPI: >60 MLS/MIN/1.73/M2
GLOBULIN SER-MCNC: 4.1 GM/DL (ref 2.4–3.5)
GLUCOSE SERPL-MCNC: 545 MG/DL (ref 74–100)
GLUCOSE UR QL STRIP.AUTO: ABNORMAL
HBA1C MFR BLD: 10.2 %
HCO3 BLDA-SCNC: 24.6 MMOL/L
HCT VFR BLD AUTO: 38.4 % (ref 37–47)
HGB BLD-MCNC: 12.7 G/DL (ref 12–16)
HOLD SPECIMEN: NORMAL
HYALINE CASTS #/AREA URNS LPF: ABNORMAL /LPF
IMM GRANULOCYTES # BLD AUTO: 0.14 X10(3)/MCL (ref 0–0.04)
IMM GRANULOCYTES NFR BLD AUTO: 0.8 %
INHALED O2 CONCENTRATION: 21 %
KETONES UR QL STRIP.AUTO: NEGATIVE
LACTATE SERPL-SCNC: 2.1 MMOL/L (ref 0.5–2.2)
LACTATE SERPL-SCNC: 2.3 MMOL/L (ref 0.5–2.2)
LEUKOCYTE ESTERASE UR QL STRIP.AUTO: NEGATIVE
LYMPHOCYTES # BLD AUTO: 1.11 X10(3)/MCL (ref 0.6–4.6)
LYMPHOCYTES NFR BLD AUTO: 6.2 %
MAGNESIUM SERPL-MCNC: 1.6 MG/DL (ref 1.6–2.6)
MCH RBC QN AUTO: 30.6 PG (ref 27–31)
MCHC RBC AUTO-ENTMCNC: 33.1 G/DL (ref 33–36)
MCV RBC AUTO: 92.5 FL (ref 80–94)
METHGB MFR BLDA: 1 %
MONOCYTES # BLD AUTO: 0.45 X10(3)/MCL (ref 0.1–1.3)
MONOCYTES NFR BLD AUTO: 2.5 %
NEUTROPHILS # BLD AUTO: 16.28 X10(3)/MCL (ref 2.1–9.2)
NEUTROPHILS NFR BLD AUTO: 90.3 %
NITRITE UR QL STRIP.AUTO: NEGATIVE
NRBC BLD AUTO-RTO: 0 %
O2 HB BLOOD GAS (OHS): 95.1 %
OXYHGB MFR BLDA: 13.5 G/DL
PCO2 BLDA: 37 MMHG (ref 40–50)
PH BLDA: 7.43 [PH] (ref 7.3–7.4)
PH UR STRIP.AUTO: 7.5 [PH]
PHOSPHATE SERPL-MCNC: 3 MG/DL (ref 2.3–4.7)
PLATELET # BLD AUTO: 544 X10(3)/MCL (ref 130–400)
PMV BLD AUTO: 10 FL (ref 7.4–10.4)
PO2 BLDA: 169 MMHG (ref 30–40)
POCT GLUCOSE: 101 MG/DL (ref 70–110)
POCT GLUCOSE: 369 MG/DL (ref 70–110)
POCT GLUCOSE: 450 MG/DL (ref 70–110)
POCT GLUCOSE: 96 MG/DL (ref 70–110)
POTASSIUM SERPL-SCNC: 5 MMOL/L (ref 3.5–5.1)
PROT SERPL-MCNC: 7.6 GM/DL (ref 6.4–8.3)
PROT UR QL STRIP.AUTO: NEGATIVE
RBC # BLD AUTO: 4.15 X10(6)/MCL (ref 4.2–5.4)
RBC #/AREA URNS AUTO: ABNORMAL /HPF
RBC UR QL AUTO: NEGATIVE
SAO2 % BLDA: 99.8 %
SODIUM SERPL-SCNC: 134 MMOL/L (ref 136–145)
SP GR UR STRIP.AUTO: <1.005 (ref 1–1.03)
SQUAMOUS #/AREA URNS LPF: ABNORMAL /HPF
TSH SERPL-ACNC: 0.49 UIU/ML (ref 0.35–4.94)
UROBILINOGEN UR STRIP-ACNC: NORMAL
WBC # SPEC AUTO: 18.02 X10(3)/MCL (ref 4.5–11.5)
WBC #/AREA URNS AUTO: ABNORMAL /HPF

## 2024-01-19 PROCEDURE — 99900035 HC TECH TIME PER 15 MIN (STAT)

## 2024-01-19 PROCEDURE — 63600175 PHARM REV CODE 636 W HCPCS

## 2024-01-19 PROCEDURE — 85025 COMPLETE CBC W/AUTO DIFF WBC: CPT | Performed by: INTERNAL MEDICINE

## 2024-01-19 PROCEDURE — 96374 THER/PROPH/DIAG INJ IV PUSH: CPT

## 2024-01-19 PROCEDURE — 83036 HEMOGLOBIN GLYCOSYLATED A1C: CPT | Mod: PBBFAC

## 2024-01-19 PROCEDURE — 21400001 HC TELEMETRY ROOM

## 2024-01-19 PROCEDURE — 82010 KETONE BODYS QUAN: CPT | Performed by: INTERNAL MEDICINE

## 2024-01-19 PROCEDURE — 99213 OFFICE O/P EST LOW 20 MIN: CPT | Mod: PBBFAC

## 2024-01-19 PROCEDURE — 82962 GLUCOSE BLOOD TEST: CPT

## 2024-01-19 PROCEDURE — 83036 HEMOGLOBIN GLYCOSYLATED A1C: CPT

## 2024-01-19 PROCEDURE — 25000003 PHARM REV CODE 250

## 2024-01-19 PROCEDURE — 81025 URINE PREGNANCY TEST: CPT | Performed by: INTERNAL MEDICINE

## 2024-01-19 PROCEDURE — 96372 THER/PROPH/DIAG INJ SC/IM: CPT | Performed by: INTERNAL MEDICINE

## 2024-01-19 PROCEDURE — 87077 CULTURE AEROBIC IDENTIFY: CPT | Performed by: INTERNAL MEDICINE

## 2024-01-19 PROCEDURE — 82803 BLOOD GASES ANY COMBINATION: CPT

## 2024-01-19 PROCEDURE — 80053 COMPREHEN METABOLIC PANEL: CPT | Performed by: INTERNAL MEDICINE

## 2024-01-19 PROCEDURE — 99285 EMERGENCY DEPT VISIT HI MDM: CPT | Mod: 25,27

## 2024-01-19 PROCEDURE — 81001 URINALYSIS AUTO W/SCOPE: CPT | Performed by: INTERNAL MEDICINE

## 2024-01-19 PROCEDURE — 84443 ASSAY THYROID STIM HORMONE: CPT | Performed by: INTERNAL MEDICINE

## 2024-01-19 PROCEDURE — 96361 HYDRATE IV INFUSION ADD-ON: CPT

## 2024-01-19 PROCEDURE — 93005 ELECTROCARDIOGRAM TRACING: CPT

## 2024-01-19 PROCEDURE — 87040 BLOOD CULTURE FOR BACTERIA: CPT | Performed by: INTERNAL MEDICINE

## 2024-01-19 PROCEDURE — 83605 ASSAY OF LACTIC ACID: CPT | Performed by: INTERNAL MEDICINE

## 2024-01-19 PROCEDURE — 63600175 PHARM REV CODE 636 W HCPCS: Performed by: INTERNAL MEDICINE

## 2024-01-19 PROCEDURE — 84100 ASSAY OF PHOSPHORUS: CPT | Performed by: INTERNAL MEDICINE

## 2024-01-19 PROCEDURE — 83735 ASSAY OF MAGNESIUM: CPT | Performed by: INTERNAL MEDICINE

## 2024-01-19 PROCEDURE — 87154 CUL TYP ID BLD PTHGN 6+ TRGT: CPT | Performed by: INTERNAL MEDICINE

## 2024-01-19 RX ORDER — LEVOCARNITINE 1 G/10ML
500 SOLUTION ORAL 2 TIMES DAILY
Status: DISCONTINUED | OUTPATIENT
Start: 2024-01-19 | End: 2024-01-20 | Stop reason: HOSPADM

## 2024-01-19 RX ORDER — IBUPROFEN 200 MG
16 TABLET ORAL
Status: DISCONTINUED | OUTPATIENT
Start: 2024-01-19 | End: 2024-01-20 | Stop reason: HOSPADM

## 2024-01-19 RX ORDER — FOLIC ACID 1 MG/1
1000 TABLET ORAL DAILY
Status: DISCONTINUED | OUTPATIENT
Start: 2024-01-20 | End: 2024-01-20 | Stop reason: HOSPADM

## 2024-01-19 RX ORDER — GLUCAGON 1 MG
1 KIT INJECTION
Status: DISCONTINUED | OUTPATIENT
Start: 2024-01-19 | End: 2024-01-20 | Stop reason: HOSPADM

## 2024-01-19 RX ORDER — IBUPROFEN 200 MG
24 TABLET ORAL
Status: DISCONTINUED | OUTPATIENT
Start: 2024-01-19 | End: 2024-01-20 | Stop reason: HOSPADM

## 2024-01-19 RX ORDER — SODIUM CHLORIDE, SODIUM LACTATE, POTASSIUM CHLORIDE, CALCIUM CHLORIDE 600; 310; 30; 20 MG/100ML; MG/100ML; MG/100ML; MG/100ML
1000 INJECTION, SOLUTION INTRAVENOUS
Status: COMPLETED | OUTPATIENT
Start: 2024-01-19 | End: 2024-01-19

## 2024-01-19 RX ORDER — INSULIN ASPART 100 [IU]/ML
0-10 INJECTION, SOLUTION INTRAVENOUS; SUBCUTANEOUS
Status: DISCONTINUED | OUTPATIENT
Start: 2024-01-19 | End: 2024-01-20 | Stop reason: HOSPADM

## 2024-01-19 RX ORDER — TACROLIMUS 0.5 MG/1
0.5 CAPSULE ORAL EVERY MORNING
Status: DISCONTINUED | OUTPATIENT
Start: 2024-01-19 | End: 2024-01-20 | Stop reason: HOSPADM

## 2024-01-19 RX ORDER — ATORVASTATIN CALCIUM 20 MG/1
20 TABLET, FILM COATED ORAL NIGHTLY
Status: DISCONTINUED | OUTPATIENT
Start: 2024-01-19 | End: 2024-01-20 | Stop reason: HOSPADM

## 2024-01-19 RX ORDER — ALBUTEROL SULFATE 90 UG/1
2 AEROSOL, METERED RESPIRATORY (INHALATION) EVERY 6 HOURS PRN
Status: DISCONTINUED | OUTPATIENT
Start: 2024-01-19 | End: 2024-01-20 | Stop reason: HOSPADM

## 2024-01-19 RX ORDER — TALC
6 POWDER (GRAM) TOPICAL NIGHTLY PRN
Status: DISCONTINUED | OUTPATIENT
Start: 2024-01-19 | End: 2024-01-20 | Stop reason: HOSPADM

## 2024-01-19 RX ORDER — ALBUTEROL SULFATE 90 UG/1
2 AEROSOL, METERED RESPIRATORY (INHALATION) EVERY 6 HOURS PRN
COMMUNITY

## 2024-01-19 RX ORDER — SODIUM CHLORIDE 0.9 % (FLUSH) 0.9 %
10 SYRINGE (ML) INJECTION
Status: DISCONTINUED | OUTPATIENT
Start: 2024-01-19 | End: 2024-01-20 | Stop reason: HOSPADM

## 2024-01-19 RX ORDER — ASPIRIN 325 MG
325 TABLET ORAL ONCE
Status: DISCONTINUED | OUTPATIENT
Start: 2024-01-19 | End: 2024-01-20 | Stop reason: HOSPADM

## 2024-01-19 RX ORDER — AMLODIPINE BESYLATE 5 MG/1
5 TABLET ORAL DAILY
Status: DISCONTINUED | OUTPATIENT
Start: 2024-01-20 | End: 2024-01-20 | Stop reason: HOSPADM

## 2024-01-19 RX ORDER — INSULIN ASPART 100 [IU]/ML
4 INJECTION, SOLUTION INTRAVENOUS; SUBCUTANEOUS
Status: COMPLETED | OUTPATIENT
Start: 2024-01-19 | End: 2024-01-19

## 2024-01-19 RX ORDER — AZELASTINE 1 MG/ML
SPRAY, METERED NASAL
COMMUNITY
Start: 2023-03-30

## 2024-01-19 RX ORDER — SODIUM CHLORIDE, SODIUM LACTATE, POTASSIUM CHLORIDE, CALCIUM CHLORIDE 600; 310; 30; 20 MG/100ML; MG/100ML; MG/100ML; MG/100ML
INJECTION, SOLUTION INTRAVENOUS CONTINUOUS
Status: DISCONTINUED | OUTPATIENT
Start: 2024-01-19 | End: 2024-01-20 | Stop reason: HOSPADM

## 2024-01-19 RX ORDER — METOPROLOL TARTRATE 25 MG/1
25 TABLET, FILM COATED ORAL 2 TIMES DAILY
Status: DISCONTINUED | OUTPATIENT
Start: 2024-01-19 | End: 2024-01-20 | Stop reason: HOSPADM

## 2024-01-19 RX ORDER — MYCOPHENOLATE MOFETIL 200 MG/ML
500 POWDER, FOR SUSPENSION ORAL 2 TIMES DAILY
Status: DISCONTINUED | OUTPATIENT
Start: 2024-01-19 | End: 2024-01-20 | Stop reason: HOSPADM

## 2024-01-19 RX ORDER — TRIAMCINOLONE ACETONIDE 0.25 MG/G
CREAM TOPICAL 2 TIMES DAILY PRN
COMMUNITY

## 2024-01-19 RX ADMIN — SODIUM CHLORIDE, POTASSIUM CHLORIDE, SODIUM LACTATE AND CALCIUM CHLORIDE 1000 ML: 600; 310; 30; 20 INJECTION, SOLUTION INTRAVENOUS at 01:01

## 2024-01-19 RX ADMIN — SODIUM CHLORIDE, POTASSIUM CHLORIDE, SODIUM LACTATE AND CALCIUM CHLORIDE: 600; 310; 30; 20 INJECTION, SOLUTION INTRAVENOUS at 07:01

## 2024-01-19 RX ADMIN — METOPROLOL TARTRATE 25 MG: 25 TABLET, FILM COATED ORAL at 10:01

## 2024-01-19 RX ADMIN — LEVOCARNITINE ORAL SOLUTION 500 MG: 1 SOLUTION ORAL at 10:01

## 2024-01-19 RX ADMIN — HUMAN INSULIN 6 UNITS: 100 INJECTION, SOLUTION SUBCUTANEOUS at 01:01

## 2024-01-19 RX ADMIN — INSULIN ASPART 4 UNITS: 100 INJECTION, SOLUTION INTRAVENOUS; SUBCUTANEOUS at 01:01

## 2024-01-19 RX ADMIN — MYCOPHENOLATE MOFETIL 500 MG: 200 POWDER, FOR SUSPENSION ORAL at 10:01

## 2024-01-19 RX ADMIN — TACROLIMUS 0.5 MG: 0.5 CAPSULE ORAL at 10:01

## 2024-01-19 RX ADMIN — ATORVASTATIN CALCIUM 20 MG: 20 TABLET, FILM COATED ORAL at 10:01

## 2024-01-19 RX ADMIN — SODIUM CHLORIDE, POTASSIUM CHLORIDE, SODIUM LACTATE AND CALCIUM CHLORIDE 1000 ML: 600; 310; 30; 20 INJECTION, SOLUTION INTRAVENOUS at 12:01

## 2024-01-19 RX ADMIN — INSULIN ASPART 0.02 UNITS: 100 INJECTION, SOLUTION INTRAVENOUS; SUBCUTANEOUS at 11:01

## 2024-01-19 NOTE — PROGRESS NOTES
[facility] END[facility] LINIC NOTE    Patient name: Chiquis Velásquez  YOB: 1995   MRN: [id number]  Appointment date: 1/19/2024  Appointment time: 09:25 AM    Subjective     HPI: Chiquis Velásquez is a 28 y.o.  American female with PMH positive for hypertension, hyperlipidemia, diabetes mellitus due to immunosuppressive medications, post surgical hypothyroidism 2/2 hemithyroidectomy due to papillary thyroid carcinoma, renal transplant 2/2 depakote exposure in utero, mental delay, who presented to endocrinology clinic today for follow up appointment.     Patient upon presentation today was noticeably uncomfortable, tearful, unable to sit still, moaning and groaning but unable to localize any pain due patient being nonverbal at baseline. Vitals showed tachycardia with  and /84. Repeat vitals 10 minutes later showed  and /85. Patient caregivers reports patient baseline heart rate is approximately 90s-100s. Patient was recently discharged from [facility] for similar issues with  at that time which approximates caregivers report. Referred patient and family to ED at that time for evaluation. Suspect volume depletion versus constipation at this time but presentation warrants further work up.    Past Medical History:  Past Medical History:   Diagnosis Date    Cancer     Diabetes mellitus, type 2     Hypothyroidism     Kidney transplanted     Stroke      Past Surgical History:  History reviewed. No pertinent surgical history.  Family History:  Family History   Adopted: Yes     Social History:  Social History     Tobacco Use    Smoking status: Never    Smokeless tobacco: Never   Substance Use Topics    Alcohol use: Never    Drug use: Never     Allergies:  Review of patient's allergies indicates:   Allergen Reactions    Azithromycin      interaction with anti-rejection rx    Clarithromycin      interaction with anti rejection rx    Loperamide Other (See Comments)     Drug  drug interaction    Milk      diarrhea    Amoxicillin-pot clavulanate Diarrhea     diarrhea     Home Medications:  Prior to Admission medications    Medication Sig Start Date End Date Taking? Authorizing Provider   amLODIPine (NORVASC) 5 MG tablet Take 5 mg by mouth. 1/3/23   Provider, Historical   aspirin 325 MG tablet 325 mg by Per G Tube route.    Provider, Historical   atorvastatin (LIPITOR) 20 MG tablet Take 20 mg by mouth every evening. 11/23/22   Provider, Historical   BASAGLAR KWIKPEN U-100 INSULIN glargine 100 units/mL SubQ pen Inject 10 Units into the skin every evening. 11/10/22   Provider, Historical   cetirizine (ZYRTEC) 1 mg/mL syrup Take 10 mg by mouth. 12/13/22   Provider, Historical   FLOVENT HFA 44 mcg/actuation inhaler Inhale into the lungs. 9/14/22   Provider, Historical   folic acid (FOLVITE) 1 MG tablet Take 1,000 mcg by mouth. 12/7/22   Provider, Historical   glipiZIDE (GLUCOTROL) 5 MG tablet Take 2 tablets (10 mg total) by mouth 2 (two) times daily with meals. 9/21/22   Shan Moreno MD   ipratropium (ATROVENT) 42 mcg (0.06 %) nasal spray 2 sprays by Each Nostril route. 12/27/22   Provider, Historical   ketoconazole (NIZORAL) 2 % shampoo Apply topically 3 (three) times a week.    Provider, Historical   lansoprazole (PREVACID SOLUTAB) 30 MG disintegrating tablet Take by mouth. 9/22/22   Provider, Historical   levocarnitine (CARNITOR) 100 mg/mL Soln Take 500 mg by mouth 2 (two) times daily. 12/7/22   Provider, Historical   levothyroxine (SYNTHROID) 125 MCG tablet Take half of a 125mcg (62.5mcg) tablet daily. 10/24/23   Shan Moreno MD   LEVSIN/SL 0.125 mg Subl Take 0.125 mg by mouth 2 (two) times daily as needed. 9/16/22   Provider, Historical   metoprolol tartrate (LOPRESSOR) 25 MG tablet TAKE ONE TABLET BY MOUTH IN THE MORNING AND ONE TABLET BEFORE BEDTIME 10/25/22   Provider, Historical   mupirocin (BACTROBAN) 2 % ointment APPLY TOPICALLY THREE TIMES DAILY AS NEEDED FOR REDNESS AT  "PEG SITE 10/21/22   Provider, Historical   mycophenolate mofetil (CELLCEPT) 200 mg/mL SusR TAKE 2.5 MLS VIA PEG TWICE DAILY 3/9/22   Provider, Historical   ondansetron (ZOFRAN) 4 mg/5 mL solution 8 mg by Per G Tube route.    Provider, Historical   pen needle, diabetic 31 gauge x 3/16" Ndle Use daily 11/10/22   Provider, Historical   prednisoLONE (ORAPRED) 15 mg/5 mL (3 mg/mL) solution Take 15 mg by mouth. 11/18/22   Provider, Historical   tacrolimus (PROGRAF) 0.5 MG Cap take 2 tabs q am every MON, TUES, WED  TAKE 1 TAB Q PM ON MON, TUE, WED  TAKE 1 TAB BID Q TH, FR, SA & SUN 4/7/22   Provider, Historical   TRADJENTA 5 mg Tab tablet Take 1 tablet (5 mg total) by mouth once daily. 12/7/23 1/6/24  Shan Moreno MD   TRUEPLUS PEN NEEDLE 31 gauge x 3/16" Ndle USE DAILY AS DIRECTED 11/10/22   Provider, Historical   white petrolatum (AQUAPHOR HEALING) 41 % Oint Apply topically.    Provider, Historical     Review of Systems:  Review of Systems   Unable to perform ROS: Patient nonverbal   Constitutional:  Negative for chills, diaphoresis, fatigue and fever.   HENT:  Negative for ear pain, hearing loss, rhinorrhea, sore throat, tinnitus and trouble swallowing.    Eyes:  Negative for pain, redness and visual disturbance.   Respiratory:  Negative for cough, chest tightness and shortness of breath.    Cardiovascular:  Negative for chest pain and palpitations.   Gastrointestinal:  Negative for abdominal pain, constipation, diarrhea, nausea and vomiting.   Genitourinary:  Negative for difficulty urinating, dysuria, frequency, hematuria and urgency.   Musculoskeletal:  Negative for arthralgias and myalgias.   Skin:  Negative for rash and wound.   Neurological:  Negative for dizziness, seizures, syncope, weakness, light-headedness, numbness and headaches.   Psychiatric/Behavioral:  Negative for confusion.        Objective     Vitals:   Vitals:    01/19/24 1005   BP: 132/84   Pulse: (!) 144   Resp:        Physical Examination: "   Physical Exam    PREVENTIVE CARE:  Lab Work:    DM (age>45 or HTN):  Lab Results   Component Value Date    HGBA1C 8.8 (H) 10/06/2022    QOSZYVK8E 10.2 01/19/2024     Lipid :  Lab Results   Component Value Date    CHOL 121 12/22/2023    TRIG 341 (H) 12/22/2023    HDL 35 (L) 12/22/2023    LDL 17.00 (L) 01/06/2023     Thyroid:  Lab Results   Component Value Date    TSH 2.360 03/27/2023    T4 9.72 01/06/2023     ASSESSMENT/PLAN:  Patient referred to directly to ED for evaluation. Visit unable to be completed at this time.    History of papillary cell carcinoma s/p hemithyroidectomy (the following information is from visit prior)  Post surgical hypothyroidism   -thyroid ultrasound (10/31/2023) showed surgically absent right thyroid lobe and small 5-6 mm hypoechoic nodule in left thyroid lobe  -will repeat thyroid ultrasound to monitor nodule  -continue levothyroxine daily     Type 2 diabetes mellitus (the following information is from visit prior)  -CBG log shows range from 200-460 with higher sugars in the morning and afternoon   -Pt is on continuous tube feeds at night and fasting throughout the day.   -Currently on Glipizide 10mg BID,Tradjenta 5mg daily, and PRN lantus 10 units at night for CBG > 400.  -Metformin previously d/c'd due to h/o renal transplant.   -Recommend that patient continue current DM GDMT with the follow adjustments: Take glipizide in the evening starting with 1.5 tabs then increasing to 2 tabs. Take Tradjenta in the evening. Take lantus 10 units in the evening consistently instead of PRN and increase by 1 unit nightly with goal fasting CBG of < 130.    Follow-up in as early as possible for follow up and continued management of uncontrolled diabetes mellitus.    No future appointments.    Danilo Orona MD  [employer] Internal Medicine PGY-1

## 2024-01-19 NOTE — H&P
[facility] Medicine  History & Physical      Patient Name: Chiquis Velásquez  : 1995  MRN: [id number]  Patient Class: Emergency   Admission Date: 2024   Length of Stay: 0  Admitting Service: Hospital Medicine  Attending Physician: Ofelia Lui*  PCP: No, Primary Doctor  Source of history: Patient, patient's family, and EMR.   Code status: No Order    Chief Complaint   Tachycardia (PT SENT FROM CLINIC FOR EVAL OF ELEVATED HR.  PT IN WC, HX OF MR SUMMER ATKINS.  HX OF STROKE, THYROID CA, DM,  KIDNEY TRANSPLANT.  DENIES FEVER. EKG OBTAINED. .  DR ANDRES AND CHARGE NURSE MADE AWARE OF PT CONDITION. ) and Hyperglycemia    History of Present Illness   28 y.o. 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    Patient is nonverbal, history obtained primarily by the mother, father is at bedside. Per mother's report, patient usually has chronic diarrhea, however in the past month began having changes in bowel movements described as harder forming stool. Mother states patient has also been moaning and groaning but unable to communicate any specific symptoms due to nonverbal at baseline. At endocrinology clinic patient noted to have a  and advised to go to ED for evaluation.     ED Course  Patient presents ED tachycardic 153 hypertensive 153/91. VBG shows pH of 7.43. WBC 18.02, K 5.0, glucose 545, UA without ketones or bacteria. EKG non-STEMI.  Aspart 100 units and regular insulin 6 units given with LR 1 L bolusHospital Medicine was consulted agrees with plan for admission.      ROS   Pertinent positive and negative as mentioned in HPI   Review of Systems   Constitutional:  Negative for  fever.        Change in behavior from baseline per mother   Gastrointestinal:         Change in stool consistency     Past Medical History     Past Medical History:   Diagnosis Date    Cancer     Diabetes mellitus, type 2     Hypothyroidism     Kidney transplanted     Stroke      Past Surgical History   No past surgical history on file.  Social History     Social History     Tobacco Use    Smoking status: Never    Smokeless tobacco: Never   Substance Use Topics    Alcohol use: Never      Family History   Reviewed and noncontributory    Allergies   Azithromycin, Clarithromycin, Loperamide, Milk, and Amoxicillin-pot clavulanate  Home Medications     Prior to Admission medications    Medication Sig Start Date End Date Taking? Authorizing Provider   albuterol (PROVENTIL/VENTOLIN HFA) 90 mcg/actuation inhaler Inhale 2 puffs into the lungs every 6 (six) hours as needed.    Provider, Historical   amLODIPine (NORVASC) 5 MG tablet Take 5 mg by mouth. 1/3/23   Provider, Historical   aspirin 325 MG tablet 325 mg by Per G Tube route.    Provider, Historical   atorvastatin (LIPITOR) 20 MG tablet Take 20 mg by mouth every evening. 11/23/22   Provider, Historical   azelastine (ASTELIN) 137 mcg (0.1 %) nasal spray USE ONE SPRAY IN EACH NOSTRIL IN THE MORNING AND BEFORE BEDTIME 3/30/23   Provider, Historical   BASAGLAR KWIKPEN U-100 INSULIN glargine 100 units/mL SubQ pen Inject 10 Units into the skin every evening. 11/10/22   Provider, Historical   cetirizine (ZYRTEC) 1 mg/mL syrup Take 10 mg by mouth. 12/13/22   Provider, Historical   FLOVENT HFA 44 mcg/actuation inhaler Inhale into the lungs. 9/14/22   Provider, Historical   folic acid (FOLVITE) 1 MG tablet Take 1,000 mcg by mouth. 12/7/22   Provider, Historical   glipiZIDE (GLUCOTROL) 5 MG tablet Take 2 tablets (10 mg total) by mouth 2 (two) times daily with meals. 9/21/22   Shan Moreno MD   ipratropium (ATROVENT) 42 mcg (0.06 %) nasal spray 2 sprays by Each Nostril route.  "12/27/22   Provider, Historical   ketoconazole (NIZORAL) 2 % shampoo Apply topically 3 (three) times a week.    Provider, Historical   lansoprazole (PREVACID SOLUTAB) 30 MG disintegrating tablet Take by mouth. 9/22/22   Provider, Historical   levocarnitine (CARNITOR) 100 mg/mL Soln Take 500 mg by mouth 2 (two) times daily. 12/7/22   Provider, Historical   levothyroxine (SYNTHROID) 125 MCG tablet Take half of a 125mcg (62.5mcg) tablet daily. 10/24/23   Shan Moreno MD   LEVSIN/SL 0.125 mg Subl Take 0.125 mg by mouth 2 (two) times daily as needed. 9/16/22   Provider, Historical   metoprolol tartrate (LOPRESSOR) 25 MG tablet TAKE ONE TABLET BY MOUTH IN THE MORNING AND ONE TABLET BEFORE BEDTIME 10/25/22   Provider, Historical   multivitamin Liqd 5 mLs by FEEDING TUBE route.    Provider, Historical   mupirocin (BACTROBAN) 2 % ointment APPLY TOPICALLY THREE TIMES DAILY AS NEEDED FOR REDNESS AT PEG SITE 10/21/22   Provider, Historical   mycophenolate mofetil (CELLCEPT) 200 mg/mL SusR TAKE 2.5 MLS VIA PEG TWICE DAILY 3/9/22   Provider, Historical   ondansetron (ZOFRAN) 4 mg/5 mL solution 8 mg by Per G Tube route.    Provider, Historical   pen needle, diabetic 31 gauge x 3/16" Ndle Use daily 11/10/22   Provider, Historical   prednisoLONE (ORAPRED) 15 mg/5 mL (3 mg/mL) solution Take 15 mg by mouth. 11/18/22   Provider, Historical   tacrolimus (PROGRAF) 0.5 MG Cap take 2 tabs q am every MON, TUES, WED  TAKE 1 TAB Q PM ON MON, TUE, WED  TAKE 1 TAB BID Q TH, FR, SA & SUN 4/7/22   Provider, Historical   TRADJENTA 5 mg Tab tablet Take 1 tablet (5 mg total) by mouth once daily. 12/7/23 1/19/24  Shan Moreno MD   triamcinolone acetonide 0.025% (KENALOG) 0.025 % cream Apply topically 2 (two) times daily as needed.    Provider, Historical   TRUEPLUS PEN NEEDLE 31 gauge x 3/16" Ndle USE DAILY AS DIRECTED 11/10/22   Provider, Historical   white petrolatum (AQUAPHOR HEALING) 41 % Oint Apply topically.    Provider, Historical    " "  Inpatient Medications   Scheduled Meds    Continuous Infusions    PRN Meds      Physical Exam   Vital Signs  Temp:  [97.7 °F (36.5 °C)]   Pulse:  [128-153]   Resp:  [16-28]   BP: (125-153)/(84-95)   SpO2:  [97 %-100 %]       Physical Exam  Constitutional:       Comments: Awake  Does not appear in any distress  Constantly moaning and gazing around the room   HENT:      Mouth/Throat:      Mouth: Mucous membranes are moist.      Pharynx: Oropharynx is clear.   Cardiovascular:      Rate and Rhythm: Normal rate and regular rhythm.   Pulmonary:      Effort: Pulmonary effort is normal. No respiratory distress.      Breath sounds: Normal breath sounds.   Abdominal:      General: Abdomen is flat.      Palpations: Abdomen is soft.   Musculoskeletal:      Right lower leg: No edema.      Left lower leg: No edema.          Labs   I have reviewed the following results below:  CBC  Recent Labs     01/19/24  1234   WBC 18.02*   RBC 4.15*   HGB 12.7   HCT 38.4   MCV 92.5   MCH 30.6   MCHC 33.1   RDW 12.1   *     Anemia  No results for input(s): "HAPTOGLOBIN", "FERRITIN", "IRON", "TIBC" in the last 72 hours.  Coags  No results for input(s): "INR", "APTT", "D-DIMER" in the last 72 hours.  Cardiac  No results for input(s): "BNP", "CPK", "CKMB", "TROPONINI" in the last 72 hours.  ABG/Lactate  Recent Labs     01/19/24  1252 01/19/24  1316   PH 7.430*  --    PCO2 37.0*  --    PO2 169.0*  --    HCO3 24.6  --    LACTIC  --  2.1      Urinalysis  Recent Labs     01/19/24  1355   COLORUA Colorless*   APPEARANCEUA Clear   SGUA <1.005*   PHUA 7.5   PROTEINUA Negative   GLUCOSEUA 4+*   KETONESUA Negative   BLOODUA Negative   UROBILINOGEN Normal   NITRITESUA Negative   LEUKOCYTESUR Negative      BMP  Recent Labs     01/19/24  1234   *   K 5.0   CHLORIDE 100   CO2 20*   BUN 14.4   CREATININE 0.97   GLUCOSE 545*   CALCIUM 9.5   MG 1.60   PHOS 3.0     LFTs  Recent Labs     01/19/24  1234   ALBUMIN 3.5   GLOBULIN 4.1*   ALKPHOS 93   ALT " "31   AST 35*   BILITOT 0.4     Inflammatory Markers  No results for input(s): "CRP", "LDH", "ESR" in the last 72 hours.  Lipid  No results for input(s): "CHOL", "TRIG", "LDL", "VLDL", "HDL" in the last 72 hours.  Diabetes  Recent Labs     01/19/24  1234   GLUCOSE 545*     Thyroid  Recent Labs     01/19/24  1234   TSH 0.491          Microbiology Results (last 7 days)       Procedure Component Value Units Date/Time    Blood Culture #1 **CANNOT BE ORDERED STAT** [[id number]] Collected: 01/19/24 1316    Order Status: Sent Specimen: Blood from Antecubital, Left Updated: 01/19/24 1317    Blood Culture #2 **CANNOT BE ORDERED STAT** [[id number]] Collected: 01/19/24 1316    Order Status: Sent Specimen: Blood from Antecubital, Right Updated: 01/19/24 1317           Imaging     X-Ray Chest 1 View   Final Result      Poor technique.  No acute findings.         Electronically signed by: Bhanu Nunez   Date:    01/19/2024   Time:    12:58        Assessment & Plan     Hyperglycemia  - Glucose 545  - VBG 7.430  - Currently on Glipizide 10mg BID,Tradjenta 5mg daily, and PRN lantus 10 units at night for CBG > 400.  - hold home regimen   - on chronic steroids; hold steroids   - Aspart 100U and regular insulin 6U given in ED  - moderate SSI     History of papillary cell carcinoma s/p hemithyroidectomy (the following information is from visit prior)  Post surgical hypothyroidism   -thyroid ultrasound (10/31/2023) showed surgically absent right thyroid lobe and small 5-6 mm hypoechoic nodule in left thyroid lobe      Access: pIV  Diet: Peg tube feeding  DVT Prophylaxis: Heparin  GI Prophylaxis: None  Fluids: LR 80cc/hr    Omari Delatorre MD  [facility], [facility]     "

## 2024-01-19 NOTE — ED PROVIDER NOTES
Encounter Date: 1/19/2024       History     Chief Complaint   Patient presents with    Tachycardia     PT SENT FROM CLINIC FOR EVAL OF ELEVATED HR.  PT IN WC, HX OF MR SUMMER ATKINS.  HX OF STROKE, THYROID CA, DM,  KIDNEY TRANSPLANT.  DENIES FEVER. EKG OBTAINED. .  DR ANDRES AND CHARGE NURSE MADE AWARE OF PT CONDITION.     Hyperglycemia     Referred from Endocrine Clinic due to Tachycardia. Pts caregiver states recently admitted for the same. Caregiver have not noticed any signs of infection or changes in state. No fever, no diarrhea, rash, wounds, cough.    The history is provided by medical records and a relative. The history is limited by a developmental delay.     Review of patient's allergies indicates:   Allergen Reactions    Azithromycin      interaction with anti-rejection rx    Clarithromycin      interaction with anti rejection rx    Loperamide Other (See Comments)     Drug drug interaction    Milk      diarrhea    Amoxicillin-pot clavulanate Diarrhea     diarrhea     Past Medical History:   Diagnosis Date    Cancer     Diabetes mellitus, type 2     Hypothyroidism     Kidney transplanted     Stroke      No past surgical history on file.  Family History   Adopted: Yes     Social History     Tobacco Use    Smoking status: Never    Smokeless tobacco: Never   Substance Use Topics    Alcohol use: Never    Drug use: Never     Review of Systems   Unable to perform ROS: Patient nonverbal       Physical Exam     Initial Vitals [01/19/24 1054]   BP Pulse Resp Temp SpO2   (!) 153/91 (!) 153 (!) 24 97.7 °F (36.5 °C) 97 %      MAP       --         Physical Exam    Nursing note and vitals reviewed.  Constitutional: She appears well-nourished. No distress.   Wheelchair bound   HENT:   Head: Normocephalic and atraumatic.   Mouth/Throat: Oropharynx is clear and moist.   Eyes: Pupils are equal, round, and reactive to light.   Neck: Neck supple.   Cardiovascular:  Regular rhythm, normal heart sounds and intact  distal pulses.           Tachycardic   Pulmonary/Chest: Breath sounds normal.   Abdominal: Abdomen is soft. Bowel sounds are normal. She exhibits no distension. There is no abdominal tenderness. There is no rebound and no guarding.   Musculoskeletal:         General: No edema.      Cervical back: Neck supple.     Skin: Skin is warm and dry. No rash noted.         ED Course   Procedures  Labs Reviewed   COMPREHENSIVE METABOLIC PANEL - Abnormal; Notable for the following components:       Result Value    Sodium Level 134 (*)     Carbon Dioxide 20 (*)     Glucose Level 545 (*)     Globulin 4.1 (*)     Albumin/Globulin Ratio 0.9 (*)     Aspartate Aminotransferase 35 (*)     All other components within normal limits   URINALYSIS, REFLEX TO URINE CULTURE - Abnormal; Notable for the following components:    Color, UA Colorless (*)     Specific Gravity, UA <1.005 (*)     Glucose, UA 4+ (*)     Squamous Epithelial Cells, UA Trace (*)     All other components within normal limits   BETA - HYDROXYBUTYRATE, SERUM - Abnormal; Notable for the following components:    Beta Hydroxybutyrate 0.90 (*)     All other components within normal limits   BLOOD GAS - Abnormal; Notable for the following components:    pH, Blood gas 7.430 (*)     pCO2, Blood gas 37.0 (*)     pO2, Blood gas 169.0 (*)     All other components within normal limits   CBC WITH DIFFERENTIAL - Abnormal; Notable for the following components:    WBC 18.02 (*)     RBC 4.15 (*)     Platelet 544 (*)     Neut # 16.28 (*)     IG# 0.14 (*)     All other components within normal limits   POCT GLUCOSE - Abnormal; Notable for the following components:    POCT Glucose 450 (*)     All other components within normal limits   POCT GLUCOSE - Abnormal; Notable for the following components:    POCT Glucose 369 (*)     All other components within normal limits   MAGNESIUM - Normal   PHOSPHORUS - Normal   LACTIC ACID, PLASMA - Normal   BLOOD CULTURE [facility]LG   BLOOD CULTURE [facility]   CBC W/ AUTO  DIFFERENTIAL    Narrative:     The following orders were created for panel order CBC auto differential.  Procedure                               Abnormality         Status                     ---------                               -----------         ------                     CBC with Differential[[id number]]       Abnormal            Final result                 Please view results for these tests on the individual orders.   EXTRA TUBES    Narrative:     The following orders were created for panel order EXTRA TUBES.  Procedure                               Abnormality         Status                     ---------                               -----------         ------                     Light Blue Top Hold[[id number]]                             Final result               Gold Top Hold[[id number]]                                   Final result               Pink Top Hold[[id number]]                                   Final result                 Please view results for these tests on the individual orders.   LIGHT BLUE TOP HOLD   GOLD TOP HOLD   PINK TOP HOLD   LACTIC ACID, PLASMA   TSH   POCT URINE PREGNANCY     EKG Readings: (Independently Interpreted)   Initial Reading: No STEMI. Rhythm: Sinus Tachycardia. Heart Rate: 150. Ectopy: No Ectopy. Conduction: Normal. ST Segments: Normal ST Segments. T Waves: Normal. Axis: Right Axis Deviation. Clinical Impression: Sinus Tachycardia     ECG Results              EKG 12-lead (Final result)  Result time 01/19/24 12:07:08      Final result by Interface, Lab In [facility] (01/19/24 12:07:08)                   Narrative:    Test Reason : R00.2,    Vent. Rate : 149 BPM     Atrial Rate : 149 BPM     P-R Int : 160 ms          QRS Dur : 072 ms      QT Int : 320 ms       P-R-T Axes : 063 104 026 degrees     QTc Int : 504 ms    Sinus tachycardia  Rightward axis  Borderline Abnormal ECG  No previous ECGs available  Confirmed by Josefina KATE, Kevin ([id number]) on 1/19/2024 12:06:59  PM    Referred By:             Confirmed By:Kevin Rocha MD                                  Imaging Results              X-Ray Chest 1 View (Final result)  Result time 01/19/24 12:58:42      Final result by Bhanu Nunez MD (01/19/24 12:58:42)                   Impression:      Poor technique.  No acute findings.      Electronically signed by: Bhanu Nunez  Date:    01/19/2024  Time:    12:58               Narrative:    EXAMINATION:  XR CHEST 1 VIEW    CLINICAL HISTORY:  tachycardia;    COMPARISON:  No priors    FINDINGS:  Frontal view of the chest was obtained. Poor technique.  Recording device noted.  Heart is not significantly enlarged.  Low lung volumes.  No dense consolidation or pneumothorax.                                       Medications   lactated ringers bolus 1,000 mL (0 mLs Intravenous Stopped 1/19/24 1337)   insulin regular injection 6 Units 0.06 mL (6 Units Intravenous Given 1/19/24 1343)   lactated ringers infusion (1,000 mLs Intravenous New Bag 1/19/24 1341)   insulin aspart U-100 injection 4 Units (4 Units Subcutaneous Given 1/19/24 1342)     Medical Decision Making  Amount and/or Complexity of Data Reviewed  Independent Historian: caregiver     Details: Pts caregiver states recently admitted for the same. Caregiver have not noticed any signs of infection or changes in state. No fever, no diarrhea, rash, wounds, cough.  Labs: ordered. Decision-making details documented in ED Course.  Radiology: ordered.  Discussion of management or test interpretation with external provider(s): 2:41 PM Consult: I discussed the case with Dr. Robles ([facility]). Agrees with current management.   Recommends will evaluate in ED.      Risk  OTC drugs.  Prescription drug management.      Additional MDM:   Differential Diagnosis:   U-DM, DKA, Hyperosmolar state, Infection, among others                                      Clinical Impression:  Final diagnoses:  [R00.2] Palpitations  [E11.65] Uncontrolled type 2  diabetes mellitus with hyperglycemia (Primary)  [R00.0] Sinus tachycardia          ED Disposition Condition    Observation Stable                Dusty Lui MD  01/19/24 8652

## 2024-01-20 ENCOUNTER — HOSPITAL ENCOUNTER (INPATIENT)
Facility: HOSPITAL | Age: 29
LOS: 4 days | Discharge: HOME | DRG: 872 | End: 2024-01-26
Attending: STUDENT IN AN ORGANIZED HEALTH CARE EDUCATION/TRAINING PROGRAM | Admitting: INTERNAL MEDICINE
Payer: MEDICARE

## 2024-01-20 VITALS
DIASTOLIC BLOOD PRESSURE: 73 MMHG | WEIGHT: 110.25 LBS | OXYGEN SATURATION: 96 % | RESPIRATION RATE: 18 BRPM | TEMPERATURE: 98 F | BODY MASS INDEX: 23.85 KG/M2 | SYSTOLIC BLOOD PRESSURE: 145 MMHG | HEART RATE: 103 BPM

## 2024-01-20 DIAGNOSIS — R78.81 BACTEREMIA: Primary | ICD-10-CM

## 2024-01-20 DIAGNOSIS — Z94.0 HISTORY OF RENAL TRANSPLANT: ICD-10-CM

## 2024-01-20 DIAGNOSIS — M85.80 OSTEOPENIA, UNSPECIFIED LOCATION: ICD-10-CM

## 2024-01-20 DIAGNOSIS — E13.9 POST-TRANSPLANT DIABETES MELLITUS: ICD-10-CM

## 2024-01-20 DIAGNOSIS — D84.9 IMMUNOCOMPROMISED PATIENT: ICD-10-CM

## 2024-01-20 DIAGNOSIS — R78.81 GRAM-POSITIVE COCCI BACTEREMIA: ICD-10-CM

## 2024-01-20 DIAGNOSIS — R78.81 POSITIVE BLOOD CULTURE: ICD-10-CM

## 2024-01-20 DIAGNOSIS — D72.829 LEUKOCYTOSIS, UNSPECIFIED TYPE: ICD-10-CM

## 2024-01-20 DIAGNOSIS — R00.0 TACHYCARDIA: ICD-10-CM

## 2024-01-20 DIAGNOSIS — R07.9 CHEST PAIN: ICD-10-CM

## 2024-01-20 DIAGNOSIS — A41.9 SEPSIS: ICD-10-CM

## 2024-01-20 DIAGNOSIS — R78.81 POSITIVE BLOOD CULTURES: ICD-10-CM

## 2024-01-20 DIAGNOSIS — M54.9 BACK PAIN, UNSPECIFIED BACK LOCATION, UNSPECIFIED BACK PAIN LATERALITY, UNSPECIFIED CHRONICITY: ICD-10-CM

## 2024-01-20 DIAGNOSIS — C73 THYROID CANCER: ICD-10-CM

## 2024-01-20 DIAGNOSIS — E89.0 HYPOTHYROIDISM, POSTSURGICAL: ICD-10-CM

## 2024-01-20 DIAGNOSIS — B95.7 COAG NEGATIVE STAPHYLOCOCCUS BACTEREMIA: ICD-10-CM

## 2024-01-20 DIAGNOSIS — R78.81 COAG NEGATIVE STAPHYLOCOCCUS BACTEREMIA: ICD-10-CM

## 2024-01-20 PROBLEM — E11.00 HYPEROSMOLAR HYPERGLYCEMIC STATE (HHS): Status: RESOLVED | Noted: 2024-01-19 | Resolved: 2024-01-20

## 2024-01-20 LAB
ACINETOBACTER CALCOACETICUS-BAUMANNII COMPLEX (OHS): NOT DETECTED
ALBUMIN SERPL-MCNC: 3.5 G/DL (ref 3.5–5)
ALBUMIN/GLOB SERPL: 0.9 RATIO (ref 1.1–2)
ALP SERPL-CCNC: 90 UNIT/L (ref 40–150)
ALT SERPL-CCNC: 33 UNIT/L (ref 0–55)
APPEARANCE UR: CLEAR
AST SERPL-CCNC: 30 UNIT/L (ref 5–34)
BACTERIA #/AREA URNS AUTO: ABNORMAL /HPF
BACTEROIDES FRAGILIS (OHS): NOT DETECTED
BASOPHILS # BLD AUTO: 0.03 X10(3)/MCL
BASOPHILS NFR BLD AUTO: 0.2 %
BILIRUB SERPL-MCNC: 0.5 MG/DL
BILIRUB UR QL STRIP.AUTO: NEGATIVE
BUN SERPL-MCNC: 9 MG/DL (ref 7–18.7)
C AURIS DNA BLD POS QL NAA+NON-PROBE: NOT DETECTED
C GATTII+NEOFOR DNA CSF QL NAA+NON-PROBE: NOT DETECTED
CALCIUM SERPL-MCNC: 9.2 MG/DL (ref 8.4–10.2)
CANDIDA ALBICANS (OHS): NOT DETECTED
CANDIDA GLABRATA (OHS): NOT DETECTED
CANDIDA KRUSEI (OHS): NOT DETECTED
CANDIDA PARAPSILOSIS (OHS): NOT DETECTED
CANDIDA TROPICALIS (OHS): NOT DETECTED
CHLORIDE SERPL-SCNC: 102 MMOL/L (ref 98–107)
CO2 SERPL-SCNC: 22 MMOL/L (ref 22–29)
COLOR UR AUTO: COLORLESS
CREAT SERPL-MCNC: 0.8 MG/DL (ref 0.55–1.02)
CTX-M (OHS): ABNORMAL
ENTEROBACTER CLOACAE COMPLEX (OHS): NOT DETECTED
ENTEROBACTERALES (OHS): NOT DETECTED
ENTEROCOCCUS FAECALIS (OHS): NOT DETECTED
ENTEROCOCCUS FAECIUM (OHS): NOT DETECTED
EOSINOPHIL # BLD AUTO: 0.07 X10(3)/MCL (ref 0–0.9)
EOSINOPHIL NFR BLD AUTO: 0.4 %
ERYTHROCYTE [DISTWIDTH] IN BLOOD BY AUTOMATED COUNT: 12 % (ref 11.5–17)
ESCHERICHIA COLI (OHS): NOT DETECTED
EST. AVERAGE GLUCOSE BLD GHB EST-MCNC: 205.9 MG/DL
GFR SERPLBLD CREATININE-BSD FMLA CKD-EPI: >60 MLS/MIN/1.73/M2
GLOBULIN SER-MCNC: 3.8 GM/DL (ref 2.4–3.5)
GLUCOSE SERPL-MCNC: 307 MG/DL (ref 74–100)
GLUCOSE UR QL STRIP.AUTO: ABNORMAL
GP B STREP DNA CSF QL NAA+NON-PROBE: NOT DETECTED
HAEM INFLU DNA CSF QL NAA+NON-PROBE: NOT DETECTED
HBA1C MFR BLD: 8.8 %
HCT VFR BLD AUTO: 41 % (ref 37–47)
HGB BLD-MCNC: 13.5 G/DL (ref 12–16)
HYALINE CASTS #/AREA URNS LPF: ABNORMAL /LPF
IMM GRANULOCYTES # BLD AUTO: 0.13 X10(3)/MCL (ref 0–0.04)
IMM GRANULOCYTES NFR BLD AUTO: 0.7 %
IMP (OHS): ABNORMAL
KETONES UR QL STRIP.AUTO: NEGATIVE
KLEBSIELLA AEROGENES (OHS): NOT DETECTED
KLEBSIELLA OXYTOCA (OHS): NOT DETECTED
KLEBSIELLA PNEUMONIAE GROUP (OHS): NOT DETECTED
KPC (OHS): ABNORMAL
L MONOCYTOG DNA CSF QL NAA+NON-PROBE: NOT DETECTED
LACTATE SERPL-SCNC: 1.9 MMOL/L (ref 0.5–2.2)
LEUKOCYTE ESTERASE UR QL STRIP.AUTO: NEGATIVE
LYMPHOCYTES # BLD AUTO: 2.2 X10(3)/MCL (ref 0.6–4.6)
LYMPHOCYTES NFR BLD AUTO: 11.5 %
MAGNESIUM SERPL-MCNC: 1.5 MG/DL (ref 1.6–2.6)
MCH RBC QN AUTO: 29.8 PG (ref 27–31)
MCHC RBC AUTO-ENTMCNC: 32.9 G/DL (ref 33–36)
MCR-1 (OHS): ABNORMAL
MCV RBC AUTO: 90.5 FL (ref 80–94)
MECA/C (OHS): DETECTED
MECA/C AND MREJ (MRSA)(OHS): ABNORMAL
MONOCYTES # BLD AUTO: 0.86 X10(3)/MCL (ref 0.1–1.3)
MONOCYTES NFR BLD AUTO: 4.5 %
N MEN DNA CSF QL NAA+NON-PROBE: NOT DETECTED
NDM (OHS): ABNORMAL
NEUTROPHILS # BLD AUTO: 15.85 X10(3)/MCL (ref 2.1–9.2)
NEUTROPHILS NFR BLD AUTO: 82.7 %
NITRITE UR QL STRIP.AUTO: NEGATIVE
NRBC BLD AUTO-RTO: 0 %
OXA-48-LIKE (OHS): ABNORMAL
PH UR STRIP.AUTO: 7.5 [PH]
PLATELET # BLD AUTO: 523 X10(3)/MCL (ref 130–400)
PMV BLD AUTO: 9.9 FL (ref 7.4–10.4)
POCT GLUCOSE: 203 MG/DL (ref 70–110)
POCT GLUCOSE: 205 MG/DL (ref 70–110)
POCT GLUCOSE: 273 MG/DL (ref 70–110)
POTASSIUM SERPL-SCNC: 3.9 MMOL/L (ref 3.5–5.1)
PROT SERPL-MCNC: 7.3 GM/DL (ref 6.4–8.3)
PROT UR QL STRIP.AUTO: ABNORMAL
PROTEUS SPP. (OHS): NOT DETECTED
PSEUDOMONAS AERUGINOSA (OHS): NOT DETECTED
RBC # BLD AUTO: 4.53 X10(6)/MCL (ref 4.2–5.4)
RBC #/AREA URNS AUTO: ABNORMAL /HPF
RBC UR QL AUTO: NEGATIVE
S ENT+BONG DNA STL QL NAA+NON-PROBE: NOT DETECTED
S PNEUM DNA CSF QL NAA+NON-PROBE: NOT DETECTED
SERRATIA MARCESCENS (OHS): NOT DETECTED
SODIUM SERPL-SCNC: 136 MMOL/L (ref 136–145)
SP GR UR STRIP.AUTO: 1 (ref 1–1.03)
SQUAMOUS #/AREA URNS LPF: ABNORMAL /HPF
STAPHYLOCOCCUS AUREUS (OHS): NOT DETECTED
STAPHYLOCOCCUS EPIDERMIDIS (OHS): DETECTED
STAPHYLOCOCCUS LUGDUNENSIS (OHS): NOT DETECTED
STAPHYLOCOCCUS SPP. (OHS): DETECTED
STENOTROPHOMONAS MALTOPHILIA (OHS): NOT DETECTED
STREP A PCR (OHS): NOT DETECTED
STREPTOCOCCUS PYOGENES (GROUP A)(OHS): NOT DETECTED
STREPTOCOCCUS SPP. (OHS): NOT DETECTED
TSH SERPL-ACNC: 0.36 UIU/ML (ref 0.35–4.94)
UROBILINOGEN UR STRIP-ACNC: NORMAL
VANA/B (OHS): ABNORMAL
VIM (OHS): ABNORMAL
WBC # SPEC AUTO: 19.14 X10(3)/MCL (ref 4.5–11.5)
WBC #/AREA URNS AUTO: ABNORMAL /HPF

## 2024-01-20 PROCEDURE — 83735 ASSAY OF MAGNESIUM: CPT | Performed by: STUDENT IN AN ORGANIZED HEALTH CARE EDUCATION/TRAINING PROGRAM

## 2024-01-20 PROCEDURE — 87651 STREP A DNA AMP PROBE: CPT | Performed by: STUDENT IN AN ORGANIZED HEALTH CARE EDUCATION/TRAINING PROGRAM

## 2024-01-20 PROCEDURE — 85025 COMPLETE CBC W/AUTO DIFF WBC: CPT | Performed by: STUDENT IN AN ORGANIZED HEALTH CARE EDUCATION/TRAINING PROGRAM

## 2024-01-20 PROCEDURE — 63600175 PHARM REV CODE 636 W HCPCS: Performed by: STUDENT IN AN ORGANIZED HEALTH CARE EDUCATION/TRAINING PROGRAM

## 2024-01-20 PROCEDURE — 94761 N-INVAS EAR/PLS OXIMETRY MLT: CPT

## 2024-01-20 PROCEDURE — 83605 ASSAY OF LACTIC ACID: CPT | Performed by: STUDENT IN AN ORGANIZED HEALTH CARE EDUCATION/TRAINING PROGRAM

## 2024-01-20 PROCEDURE — 25000003 PHARM REV CODE 250

## 2024-01-20 PROCEDURE — 93005 ELECTROCARDIOGRAM TRACING: CPT

## 2024-01-20 PROCEDURE — 87040 BLOOD CULTURE FOR BACTERIA: CPT | Performed by: STUDENT IN AN ORGANIZED HEALTH CARE EDUCATION/TRAINING PROGRAM

## 2024-01-20 PROCEDURE — 84443 ASSAY THYROID STIM HORMONE: CPT | Performed by: STUDENT IN AN ORGANIZED HEALTH CARE EDUCATION/TRAINING PROGRAM

## 2024-01-20 PROCEDURE — 63600175 PHARM REV CODE 636 W HCPCS

## 2024-01-20 PROCEDURE — G0378 HOSPITAL OBSERVATION PER HR: HCPCS

## 2024-01-20 PROCEDURE — 99285 EMERGENCY DEPT VISIT HI MDM: CPT | Mod: 25

## 2024-01-20 PROCEDURE — 80053 COMPREHEN METABOLIC PANEL: CPT | Performed by: STUDENT IN AN ORGANIZED HEALTH CARE EDUCATION/TRAINING PROGRAM

## 2024-01-20 PROCEDURE — 96360 HYDRATION IV INFUSION INIT: CPT

## 2024-01-20 PROCEDURE — 81001 URINALYSIS AUTO W/SCOPE: CPT | Performed by: STUDENT IN AN ORGANIZED HEALTH CARE EDUCATION/TRAINING PROGRAM

## 2024-01-20 PROCEDURE — 99900035 HC TECH TIME PER 15 MIN (STAT)

## 2024-01-20 RX ORDER — SODIUM CHLORIDE 0.9 % (FLUSH) 0.9 %
10 SYRINGE (ML) INJECTION EVERY 12 HOURS PRN
Status: DISCONTINUED | OUTPATIENT
Start: 2024-01-20 | End: 2024-01-26 | Stop reason: HOSPADM

## 2024-01-20 RX ORDER — IBUPROFEN 200 MG
16 TABLET ORAL
Status: DISCONTINUED | OUTPATIENT
Start: 2024-01-20 | End: 2024-01-26 | Stop reason: HOSPADM

## 2024-01-20 RX ORDER — ATORVASTATIN CALCIUM 20 MG/1
20 TABLET, FILM COATED ORAL NIGHTLY
Status: DISCONTINUED | OUTPATIENT
Start: 2024-01-21 | End: 2024-01-26 | Stop reason: HOSPADM

## 2024-01-20 RX ORDER — METOPROLOL TARTRATE 25 MG/1
25 TABLET, FILM COATED ORAL 2 TIMES DAILY
Status: DISCONTINUED | OUTPATIENT
Start: 2024-01-21 | End: 2024-01-26 | Stop reason: HOSPADM

## 2024-01-20 RX ORDER — IBUPROFEN 200 MG
24 TABLET ORAL
Status: DISCONTINUED | OUTPATIENT
Start: 2024-01-20 | End: 2024-01-26 | Stop reason: HOSPADM

## 2024-01-20 RX ORDER — LEVOCARNITINE 1 G/10ML
500 SOLUTION ORAL 2 TIMES DAILY
Status: DISCONTINUED | OUTPATIENT
Start: 2024-01-21 | End: 2024-01-26 | Stop reason: HOSPADM

## 2024-01-20 RX ORDER — TACROLIMUS 0.5 MG/1
0.5 CAPSULE ORAL EVERY MORNING
Status: DISCONTINUED | OUTPATIENT
Start: 2024-01-21 | End: 2024-01-21

## 2024-01-20 RX ORDER — INSULIN ASPART 100 [IU]/ML
0-10 INJECTION, SOLUTION INTRAVENOUS; SUBCUTANEOUS
Status: DISCONTINUED | OUTPATIENT
Start: 2024-01-20 | End: 2024-01-23

## 2024-01-20 RX ORDER — GLUCAGON 1 MG
1 KIT INJECTION
Status: DISCONTINUED | OUTPATIENT
Start: 2024-01-20 | End: 2024-01-26 | Stop reason: HOSPADM

## 2024-01-20 RX ORDER — NALOXONE HCL 0.4 MG/ML
0.02 VIAL (ML) INJECTION
Status: DISCONTINUED | OUTPATIENT
Start: 2024-01-20 | End: 2024-01-26 | Stop reason: HOSPADM

## 2024-01-20 RX ORDER — AMLODIPINE BESYLATE 5 MG/1
5 TABLET ORAL DAILY
Status: DISCONTINUED | OUTPATIENT
Start: 2024-01-21 | End: 2024-01-26 | Stop reason: HOSPADM

## 2024-01-20 RX ORDER — ASPIRIN 325 MG
325 TABLET ORAL DAILY
Status: DISCONTINUED | OUTPATIENT
Start: 2024-01-21 | End: 2024-01-26 | Stop reason: HOSPADM

## 2024-01-20 RX ORDER — FOLIC ACID 1 MG/1
1000 TABLET ORAL DAILY
Status: DISCONTINUED | OUTPATIENT
Start: 2024-01-21 | End: 2024-01-26 | Stop reason: HOSPADM

## 2024-01-20 RX ORDER — ALBUTEROL SULFATE 90 UG/1
2 AEROSOL, METERED RESPIRATORY (INHALATION) EVERY 6 HOURS PRN
Status: DISCONTINUED | OUTPATIENT
Start: 2024-01-20 | End: 2024-01-26 | Stop reason: HOSPADM

## 2024-01-20 RX ORDER — MYCOPHENOLATE MOFETIL 200 MG/ML
500 POWDER, FOR SUSPENSION ORAL 2 TIMES DAILY
Status: DISCONTINUED | OUTPATIENT
Start: 2024-01-20 | End: 2024-01-22

## 2024-01-20 RX ADMIN — MYCOPHENOLATE MOFETIL 500 MG: 200 POWDER, FOR SUSPENSION ORAL at 11:01

## 2024-01-20 RX ADMIN — INSULIN ASPART 6 UNITS: 100 INJECTION, SOLUTION INTRAVENOUS; SUBCUTANEOUS at 06:01

## 2024-01-20 RX ADMIN — VANCOMYCIN HYDROCHLORIDE 750 MG: 750 INJECTION, POWDER, LYOPHILIZED, FOR SOLUTION INTRAVENOUS at 11:01

## 2024-01-20 RX ADMIN — SODIUM CHLORIDE, POTASSIUM CHLORIDE, SODIUM LACTATE AND CALCIUM CHLORIDE 1000 ML: 600; 310; 30; 20 INJECTION, SOLUTION INTRAVENOUS at 08:01

## 2024-01-20 NOTE — CARE UPDATE
On Call Medicine Resident Critical Lab    Lab called to relay critical result, patient had 1/2 blood cultures grow gram+ cocci in anaerobic bottle, probable staph. Patient discharged earlier this morning. Call placed to Mr. Wade Velásquez, pt's father and explained result, that pt is best to come back for evaluation and IV antibiotics at this time. He was understanding, stating they will be back this evening to the ER so that she can be readmitted. Pt's mother called back as well and confirmed, all questions answered at this time. Pt will be readmitted to team 2 upon return.    Yang Goins MD  [employer] IM PGY III

## 2024-01-20 NOTE — DISCHARGE INSTRUCTIONS
Pt and parent have been given discharge instructions and have stated understanding. Pt is being taken to hospital entrance to family vehicle by staff.

## 2024-01-20 NOTE — PLAN OF CARE
Problem: Skin Injury Risk Increased  Goal: Skin Health and Integrity  Outcome: Ongoing, Progressing     Problem: Fall Injury Risk  Goal: Absence of Fall and Fall-Related Injury  Outcome: Ongoing, Progressing     Problem: Adult Inpatient Plan of Care  Goal: Plan of Care Review  Outcome: Ongoing, Progressing  Goal: Patient-Specific Goal (Individualized)  Outcome: Ongoing, Progressing  Goal: Absence of Hospital-Acquired Illness or Injury  Outcome: Ongoing, Progressing  Goal: Optimal Comfort and Wellbeing  Outcome: Ongoing, Progressing  Goal: Readiness for Transition of Care  Outcome: Ongoing, Progressing     Problem: Diabetes Comorbidity  Goal: Blood Glucose Level Within Targeted Range  Outcome: Ongoing, Progressing

## 2024-01-21 LAB
ALBUMIN SERPL-MCNC: 2.9 G/DL (ref 3.5–5)
ALBUMIN/GLOB SERPL: 0.9 RATIO (ref 1.1–2)
ALP SERPL-CCNC: 70 UNIT/L (ref 40–150)
ALT SERPL-CCNC: 23 UNIT/L (ref 0–55)
AST SERPL-CCNC: 17 UNIT/L (ref 5–34)
BASOPHILS # BLD AUTO: 0.05 X10(3)/MCL
BASOPHILS NFR BLD AUTO: 0.3 %
BILIRUB SERPL-MCNC: 0.4 MG/DL
BUN SERPL-MCNC: 7.3 MG/DL (ref 7–18.7)
CALCIUM SERPL-MCNC: 8.7 MG/DL (ref 8.4–10.2)
CHLORIDE SERPL-SCNC: 106 MMOL/L (ref 98–107)
CO2 SERPL-SCNC: 21 MMOL/L (ref 22–29)
CREAT SERPL-MCNC: 0.72 MG/DL (ref 0.55–1.02)
EOSINOPHIL # BLD AUTO: 0.31 X10(3)/MCL (ref 0–0.9)
EOSINOPHIL NFR BLD AUTO: 2.1 %
ERYTHROCYTE [DISTWIDTH] IN BLOOD BY AUTOMATED COUNT: 12 % (ref 11.5–17)
GFR SERPLBLD CREATININE-BSD FMLA CKD-EPI: >60 MLS/MIN/1.73/M2
GLOBULIN SER-MCNC: 3.2 GM/DL (ref 2.4–3.5)
GLUCOSE SERPL-MCNC: 215 MG/DL (ref 74–100)
HCT VFR BLD AUTO: 35.5 % (ref 37–47)
HGB BLD-MCNC: 11.8 G/DL (ref 12–16)
IMM GRANULOCYTES # BLD AUTO: 0.07 X10(3)/MCL (ref 0–0.04)
IMM GRANULOCYTES NFR BLD AUTO: 0.5 %
LYMPHOCYTES # BLD AUTO: 3.79 X10(3)/MCL (ref 0.6–4.6)
LYMPHOCYTES NFR BLD AUTO: 25.3 %
MCH RBC QN AUTO: 30.3 PG (ref 27–31)
MCHC RBC AUTO-ENTMCNC: 33.2 G/DL (ref 33–36)
MCV RBC AUTO: 91 FL (ref 80–94)
MONOCYTES # BLD AUTO: 1.19 X10(3)/MCL (ref 0.1–1.3)
MONOCYTES NFR BLD AUTO: 7.9 %
NEUTROPHILS # BLD AUTO: 9.58 X10(3)/MCL (ref 2.1–9.2)
NEUTROPHILS NFR BLD AUTO: 63.9 %
NRBC BLD AUTO-RTO: 0 %
PLATELET # BLD AUTO: 455 X10(3)/MCL (ref 130–400)
PMV BLD AUTO: 10.6 FL (ref 7.4–10.4)
POCT GLUCOSE: 142 MG/DL (ref 70–110)
POCT GLUCOSE: 178 MG/DL (ref 70–110)
POCT GLUCOSE: 254 MG/DL (ref 70–110)
POCT GLUCOSE: 287 MG/DL (ref 70–110)
POCT GLUCOSE: 296 MG/DL (ref 70–110)
POTASSIUM SERPL-SCNC: 3.3 MMOL/L (ref 3.5–5.1)
PROT SERPL-MCNC: 6.1 GM/DL (ref 6.4–8.3)
RBC # BLD AUTO: 3.9 X10(6)/MCL (ref 4.2–5.4)
SODIUM SERPL-SCNC: 139 MMOL/L (ref 136–145)
VANCOMYCIN TROUGH SERPL-MCNC: 8.3 UG/ML (ref 15–20)
WBC # SPEC AUTO: 14.99 X10(3)/MCL (ref 4.5–11.5)

## 2024-01-21 PROCEDURE — 80202 ASSAY OF VANCOMYCIN: CPT

## 2024-01-21 PROCEDURE — 25000003 PHARM REV CODE 250

## 2024-01-21 PROCEDURE — 96372 THER/PROPH/DIAG INJ SC/IM: CPT

## 2024-01-21 PROCEDURE — 97161 PT EVAL LOW COMPLEX 20 MIN: CPT

## 2024-01-21 PROCEDURE — 99900035 HC TECH TIME PER 15 MIN (STAT)

## 2024-01-21 PROCEDURE — 63600175 PHARM REV CODE 636 W HCPCS

## 2024-01-21 PROCEDURE — 80053 COMPREHEN METABOLIC PANEL: CPT

## 2024-01-21 PROCEDURE — 85025 COMPLETE CBC W/AUTO DIFF WBC: CPT

## 2024-01-21 PROCEDURE — G0378 HOSPITAL OBSERVATION PER HR: HCPCS

## 2024-01-21 RX ORDER — MAGNESIUM SULFATE HEPTAHYDRATE 40 MG/ML
2 INJECTION, SOLUTION INTRAVENOUS ONCE
Status: COMPLETED | OUTPATIENT
Start: 2024-01-21 | End: 2024-01-21

## 2024-01-21 RX ORDER — SODIUM CHLORIDE, SODIUM LACTATE, POTASSIUM CHLORIDE, CALCIUM CHLORIDE 600; 310; 30; 20 MG/100ML; MG/100ML; MG/100ML; MG/100ML
INJECTION, SOLUTION INTRAVENOUS CONTINUOUS
Status: ACTIVE | OUTPATIENT
Start: 2024-01-21 | End: 2024-01-23

## 2024-01-21 RX ADMIN — FOLIC ACID 1000 MCG: 1 TABLET ORAL at 09:01

## 2024-01-21 RX ADMIN — AMLODIPINE BESYLATE 5 MG: 5 TABLET ORAL at 09:01

## 2024-01-21 RX ADMIN — METOPROLOL TARTRATE 25 MG: 25 TABLET, FILM COATED ORAL at 09:01

## 2024-01-21 RX ADMIN — INSULIN ASPART 2 UNITS: 100 INJECTION, SOLUTION INTRAVENOUS; SUBCUTANEOUS at 12:01

## 2024-01-21 RX ADMIN — ASPIRIN 325 MG ORAL TABLET 325 MG: 325 PILL ORAL at 09:01

## 2024-01-21 RX ADMIN — LEVOCARNITINE ORAL SOLUTION 500 MG: 1 SOLUTION ORAL at 09:01

## 2024-01-21 RX ADMIN — VANCOMYCIN HYDROCHLORIDE 750 MG: 750 INJECTION, POWDER, LYOPHILIZED, FOR SOLUTION INTRAVENOUS at 11:01

## 2024-01-21 RX ADMIN — VANCOMYCIN HYDROCHLORIDE 500 MG: 500 INJECTION, POWDER, LYOPHILIZED, FOR SOLUTION INTRAVENOUS at 11:01

## 2024-01-21 RX ADMIN — SODIUM CHLORIDE, POTASSIUM CHLORIDE, SODIUM LACTATE AND CALCIUM CHLORIDE: 600; 310; 30; 20 INJECTION, SOLUTION INTRAVENOUS at 04:01

## 2024-01-21 RX ADMIN — MAGNESIUM SULFATE HEPTAHYDRATE 2 G: 40 INJECTION, SOLUTION INTRAVENOUS at 09:01

## 2024-01-21 RX ADMIN — MYCOPHENOLATE MOFETIL 500 MG: 200 POWDER, FOR SUSPENSION ORAL at 10:01

## 2024-01-21 RX ADMIN — ATORVASTATIN CALCIUM 20 MG: 20 TABLET, FILM COATED ORAL at 09:01

## 2024-01-21 RX ADMIN — SODIUM CHLORIDE, POTASSIUM CHLORIDE, SODIUM LACTATE AND CALCIUM CHLORIDE: 600; 310; 30; 20 INJECTION, SOLUTION INTRAVENOUS at 01:01

## 2024-01-21 RX ADMIN — INSULIN ASPART 6 UNITS: 100 INJECTION, SOLUTION INTRAVENOUS; SUBCUTANEOUS at 04:01

## 2024-01-21 RX ADMIN — INSULIN ASPART 6 UNITS: 100 INJECTION, SOLUTION INTRAVENOUS; SUBCUTANEOUS at 06:01

## 2024-01-21 NOTE — H&P
"[facility] Medicine  History & Physical      Patient Name: Chiquis Velásquez  : 1995  MRN: [id number]  Patient Class: OP- Observation   Admission Date: 2024   Length of Stay: 0  Admitting Service: Hospital Medicine  Attending Physician: Jocelyne Pacheco MD  PCP: Kristen, Primary Doctor  Source of history: Patient's family and EMR.   Code status: Full Code    Chief Complaint   Abnormal Lab (Pt discharged from hospital today and was called at home to return for possible "blood infection".  Pt non-verbal.  Father with pt. )    History of Present Illness   28 y.o. female with PMHx of Renal transplant on immunosuppressants, DM-II, HTN, HLD, Papillary thyroid carcinoma s/p hemithyroidectomy with resulting Hypothyroidism, CVA with left-sided deficits, developmental delay, and dysphagia with PEG tube was sent to the ED with her father for sepsis work-up. Patient was admitted yesterday for tachycardia of 150bpm, improved with IVF hydration, and was discharged home earlier today. Father was called to bring the patient back to the ED for positive blood cultures that were obtained during admission. Father denies any new or worsening symptoms since discharge. He reports 1 episode of diarrhea today after increasing fluid intake due to hard stools. He reports bowl movements typically fluctuate between hard and soft stools. He denies recent fever, cough, congestion, vomiting, blood in stool, rashes, and changes in baseline mentation. Patient is nonverbal at baseline.    In ED, patient is afebrile, normotensive, and tachycardic with pulse in 130s. Labs reveal WBC of 19.1, which appears at patient's baseline, stable H/H at 13.5/41.0, Lactic acid of 1.9, Glucose of 307, and slight hypomagnesemia at 1.5. Pt was given LR 1L bolus in ED. Repeat blood cultures were drawn. Internal medicine consulted for further management.     ROS   Pertinent positive and negative as mentioned in HPI   Review of " Systems   Constitutional:  Negative for fever.   HENT:  Negative for congestion.    Respiratory:  Negative for cough.    Gastrointestinal:  Positive for constipation and diarrhea. Negative for blood in stool and vomiting.   Genitourinary:  Negative for hematuria.   Skin:  Negative for itching and rash.     Past Medical History     Past Medical History:   Diagnosis Date    Cancer     Diabetes mellitus, type 2     Hypothyroidism     Kidney transplanted     Stroke      Past Surgical History   No past surgical history on file.  Social History     Social History     Tobacco Use    Smoking status: Never    Smokeless tobacco: Never   Substance Use Topics    Alcohol use: Never      Family History   Reviewed and noncontributory    Allergies   Azithromycin, Clarithromycin, Loperamide, Milk, and Amoxicillin-pot clavulanate  Home Medications     Prior to Admission medications    Medication Sig Start Date End Date Taking? Authorizing Provider   albuterol (PROVENTIL/VENTOLIN HFA) 90 mcg/actuation inhaler Inhale 2 puffs into the lungs every 6 (six) hours as needed.    Provider, Historical   amLODIPine (NORVASC) 5 MG tablet Take 5 mg by mouth. 1/3/23   Provider, Historical   aspirin 325 MG tablet 325 mg by Per G Tube route.    Provider, Historical   atorvastatin (LIPITOR) 20 MG tablet Take 20 mg by mouth every evening. 11/23/22   Provider, Historical   azelastine (ASTELIN) 137 mcg (0.1 %) nasal spray USE ONE SPRAY IN EACH NOSTRIL IN THE MORNING AND BEFORE BEDTIME 3/30/23   Provider, Historical   BASAGLAR KWIKPEN U-100 INSULIN glargine 100 units/mL SubQ pen Inject 10 Units into the skin every evening. 11/10/22   Provider, Historical   cetirizine (ZYRTEC) 1 mg/mL syrup Take 10 mg by mouth. 12/13/22   Provider, Historical   FLOVENT HFA 44 mcg/actuation inhaler Inhale into the lungs. 9/14/22   Provider, Historical   folic acid (FOLVITE) 1 MG tablet Take 1,000 mcg by mouth. 12/7/22   Provider, Historical   glipiZIDE (GLUCOTROL) 5 MG  "tablet Take 2 tablets (10 mg total) by mouth 2 (two) times daily with meals. 9/21/22   Shan Moreno MD   ipratropium (ATROVENT) 42 mcg (0.06 %) nasal spray 2 sprays by Each Nostril route. 12/27/22   Provider, Historical   ketoconazole (NIZORAL) 2 % shampoo Apply topically 3 (three) times a week.    Provider, Historical   lansoprazole (PREVACID SOLUTAB) 30 MG disintegrating tablet Take by mouth. 9/22/22   Provider, Historical   levocarnitine (CARNITOR) 100 mg/mL Soln Take 500 mg by mouth 2 (two) times daily. 12/7/22   Provider, Historical   levothyroxine (SYNTHROID) 125 MCG tablet Take half of a 125mcg (62.5mcg) tablet daily. 10/24/23   Shan Moreno MD   LEVSIN/SL 0.125 mg Subl Take 0.125 mg by mouth 2 (two) times daily as needed. 9/16/22   Provider, Historical   metoprolol tartrate (LOPRESSOR) 25 MG tablet TAKE ONE TABLET BY MOUTH IN THE MORNING AND ONE TABLET BEFORE BEDTIME 10/25/22   Provider, Historical   multivitamin Liqd 5 mLs by FEEDING TUBE route.    Provider, Historical   mupirocin (BACTROBAN) 2 % ointment APPLY TOPICALLY THREE TIMES DAILY AS NEEDED FOR REDNESS AT PEG SITE 10/21/22   Provider, Historical   mycophenolate mofetil (CELLCEPT) 200 mg/mL SusR TAKE 2.5 MLS VIA PEG TWICE DAILY 3/9/22   Provider, Historical   ondansetron (ZOFRAN) 4 mg/5 mL solution 8 mg by Per G Tube route.    Provider, Historical   pen needle, diabetic 31 gauge x 3/16" Ndle Use daily 11/10/22   Provider, Historical   prednisoLONE (ORAPRED) 15 mg/5 mL (3 mg/mL) solution Take 15 mg by mouth. 11/18/22   Provider, Historical   tacrolimus (PROGRAF) 0.5 MG Cap take 2 tabs q am every MON, TUES, WED  TAKE 1 TAB Q PM ON MON, TUE, WED  TAKE 1 TAB BID Q TH, FR, SA & SUN 4/7/22   Provider, Historical   TRADJENTA 5 mg Tab tablet Take 1 tablet (5 mg total) by mouth once daily. 12/7/23 1/19/24  Shan Moreno MD   triamcinolone acetonide 0.025% (KENALOG) 0.025 % cream Apply topically 2 (two) times daily as needed.    Provider, " "Historical   TRUEPLUS PEN NEEDLE 31 gauge x 3/16" Ndle USE DAILY AS DIRECTED 11/10/22   Provider, Historical   white petrolatum (AQUAPHOR HEALING) 41 % Oint Apply topically.    Provider, Historical      Inpatient Medications   Scheduled Meds   amLODIPine  5 mg Oral Daily    aspirin  325 mg Per G Tube Daily    atorvastatin  20 mg Oral QHS    folic acid  1,000 mcg Per G Tube Daily    levocarnitine  500 mg Oral BID    metoprolol tartrate  25 mg Oral BID    mycophenolate mofetil  500 mg Per G Tube BID    tacrolimus  0.5 mg Per G Tube Daily AM    vancomycin (VANCOCIN) IV (PEDS and ADULTS)  500 mg Intravenous Q12H     Continuous Infusions   lactated ringers 90 mL/hr at 01/21/24 0120     PRN Meds  albuterol, dextrose 10%, dextrose 10%, glucagon (human recombinant), glucose, glucose, insulin aspart U-100, naloxone, sodium chloride 0.9%, Pharmacy to dose Vancomycin consult **AND** vancomycin - pharmacy to dose    Physical Exam   Vital Signs  Temp:  [97.8 °F (36.6 °C)-98.2 °F (36.8 °C)]   Pulse:  []   Resp:  [16-31]   BP: (126-143)/()   SpO2:  [92 %-99 %]       General: Nonverbal at baseline, Appears non-distressed  HEENT: NC/AT, conjunctiva normal, moist mucus membranes   CVS: RRR, no LE edema   Abdomen: soft, non-distended; PEG tube in place, insertion site clean and dry with no surrounding erythema.  MSK: No obvious deformity or joint swelling  Skin: Warm and dry  Neuro: Awake and alert    Labs   I have reviewed the following results below:  CBC  Recent Labs     01/20/24  1935 01/21/24  0403   WBC 19.14* 14.99*   RBC 4.53 3.90*   HGB 13.5 11.8*   HCT 41.0 35.5*   MCV 90.5 91.0   MCH 29.8 30.3   MCHC 32.9* 33.2   RDW 12.0 12.0   * 455*     Anemia  No results for input(s): "HAPTOGLOBIN", "FERRITIN", "IRON", "TIBC" in the last 72 hours.  Coags  No results for input(s): "INR", "APTT", "D-DIMER" in the last 72 hours.  Cardiac  No results for input(s): "BNP", "CPK", "CKMB", "TROPONINI" in the last 72 " "hours.  ABG/Lactate  Recent Labs     01/19/24  1252 01/19/24  1316 01/19/24  1608 01/20/24 1935   PH 7.430*  --   --   --    PCO2 37.0*  --   --   --    PO2 169.0*  --   --   --    HCO3 24.6  --   --   --    LACTIC  --  2.1 2.3* 1.9      Urinalysis  Recent Labs     01/20/24 2247   COLORUA Colorless*   APPEARANCEUA Clear   SGUA 1.005   PHUA 7.5   PROTEINUA Trace*   GLUCOSEUA 1+*   KETONESUA Negative   BLOODUA Negative   UROBILINOGEN Normal   NITRITESUA Negative   LEUKOCYTESUR Negative      BMP  Recent Labs     01/19/24  1234 01/20/24 1935 01/21/24  0403   * 136 139   K 5.0 3.9 3.3*   CHLORIDE 100 102 106   CO2 20* 22 21*   BUN 14.4 9.0 7.3   CREATININE 0.97 0.80 0.72   GLUCOSE 545* 307* 215*   CALCIUM 9.5 9.2 8.7   MG 1.60 1.50*  --    PHOS 3.0  --   --      LFTs  Recent Labs     01/20/24 1935 01/21/24  0403   ALBUMIN 3.5 2.9*   GLOBULIN 3.8* 3.2   ALKPHOS 90 70   ALT 33 23   AST 30 17   BILITOT 0.5 0.4     Inflammatory Markers  No results for input(s): "CRP", "LDH", "ESR" in the last 72 hours.  Lipid  No results for input(s): "CHOL", "TRIG", "LDL", "VLDL", "HDL" in the last 72 hours.  Diabetes  Recent Labs     01/19/24  1234 01/19/24  2330 01/20/24 1935 01/21/24  0403   HGBA1C  --  8.8*  --   --    GLUCOSE 545*  --  307* 215*     Thyroid  Recent Labs     01/20/24 1935   TSH 0.357          Microbiology Results (last 7 days)       Procedure Component Value Units Date/Time    Blood culture #2 **CANNOT BE ORDERED STAT** [[id number]] Collected: 01/20/24 1935    Order Status: Sent Specimen: Blood Updated: 01/20/24 2012    Blood culture #1 **CANNOT BE ORDERED STAT** [[id number]] Collected: 01/20/24 1935    Order Status: Sent Specimen: Blood Updated: 01/20/24 2012           Imaging     X-Ray Chest 1 View   Final Result      Cardiomegaly and mild interstitial thickening which may reflect interstitial edema versus vascular crowding secondary to underinflation.         Electronically signed by: Mt Jha MD "   Date:    01/20/2024   Time:    20:38        Assessment & Plan     Gram positive cocci bacteremia   SIRS 2/4 (tachycardia and leukocytosis)  - Pt afebrile with pulse of 130. WBC 19.14, appears at baseline. Chronic leukocytosis likely 2/2 chronic steroid use.   - Blood cultures collected on 1/19/24 positive for staph epidermidis. Repeat blood cultures obtained on 1/20/24.   - Will initiate Vancomycin   - Echo ordered  - Consider ID consult per primary team    DM  - Hold home medications  - Moderate SSI initiated   - CGB checks qid    HTN  - Continue home metoprolol and amlodipine     Hx of Renal Transplant  - Continue home Cellcept and tacrolimus       Access: pIV  Antibiotics: Vanc  Diet: PEG tube feeds per family  DVT Prophylaxis: SCDs and ASA  GI Prophylaxis: none  Fluids: LR @ 90mL/hr    Gretchen Vance DO  Family Medicine, [facility]

## 2024-01-21 NOTE — PROGRESS NOTES
Pharmacokinetic Initial Assessment: IV Vancomycin    Assessment/Plan:    Initiate intravenous vancomycin with loading dose of 750 mg once followed by a maintenance dose of vancomycin 500mg IV every 12 hours  Desired empiric serum trough concentration is 15 to 20 mcg/mL  Draw vancomycin trough level 60 min prior to third dose on 1/21 at approximately 2200  Pharmacy will continue to follow and monitor vancomycin.      Please contact pharmacy at extension [phone] with any questions regarding this assessment.     Thank you for the consult,   Slime Weems       Patient brief summary:  Chiquis Velásquez is a 28 y.o. female initiated on antimicrobial therapy with IV Vancomycin for treatment of suspected bacteremia    Drug Allergies:   Review of patient's allergies indicates:   Allergen Reactions    Azithromycin      interaction with anti-rejection rx    Clarithromycin      interaction with anti rejection rx    Loperamide Other (See Comments)     Drug drug interaction    Milk      diarrhea    Amoxicillin-pot clavulanate Diarrhea     diarrhea       Actual Body Weight:   50 kg    Renal Function:   Estimated Creatinine Clearance: 67 mL/min (based on SCr of 0.8 mg/dL).,     Dialysis Method (if applicable):  N/A    CBC (last 72 hours):  Recent Labs   Lab Result Units 01/19/24  1234 01/19/24  2330 01/20/24  1935   WBC x10(3)/mcL 18.02*  --  19.14*   Hgb g/dL 12.7  --  13.5   Hemoglobin A1c %  --  8.8*  --    Hct % 38.4  --  41.0   Platelet x10(3)/mcL 544*  --  523*   Mono % % 2.5  --  4.5   Eos % % 0.0  --  0.4   Basophil % % 0.2  --  0.2       Metabolic Panel (last 72 hours):  Recent Labs   Lab Result Units 01/19/24  1234 01/19/24  1355 01/20/24  1935   Sodium Level mmol/L 134*  --  136   Potassium Level mmol/L 5.0  --  3.9   Chloride mmol/L 100  --  102   Carbon Dioxide mmol/L 20*  --  22   Glucose Level mg/dL 545*  --  307*   Glucose, UA   --  4+*  --    Blood Urea Nitrogen mg/dL 14.4  --  9.0   Creatinine mg/dL 0.97  --   "0.80   Albumin Level g/dL 3.5  --  3.5   Bilirubin Total mg/dL 0.4  --  0.5   Alkaline Phosphatase unit/L 93  --  90   Aspartate Aminotransferase unit/L 35*  --  30   Alanine Aminotransferase unit/L 31  --  33   Magnesium Level mg/dL 1.60  --  1.50*   Phosphorus Level mg/dL 3.0  --   --        Drug levels (last 3 results):  No results for input(s): "VANCOMYCINRA", "VANCORANDOM", "VANCOMYCINPE", "VANCOPEAK", "VANCOMYCINTR", "VANCOTROUGH" in the last 72 hours.    Microbiologic Results:  Microbiology Results (last 7 days)       Procedure Component Value Units Date/Time    Blood culture #2 **CANNOT BE ORDERED STAT** [[id number]] Collected: 01/20/24 1935    Order Status: Sent Specimen: Blood Updated: 01/20/24 2012    Blood culture #1 **CANNOT BE ORDERED STAT** [[id number]] Collected: 01/20/24 1935    Order Status: Sent Specimen: Blood Updated: 01/20/24 2012            "

## 2024-01-21 NOTE — ED PROVIDER NOTES
"Encounter Date: 1/20/2024       History     Chief Complaint   Patient presents with    Abnormal Lab     Pt discharged from hospital today and was called at home to return for possible "blood infection".  Pt non-verbal.  Father with pt.      Patient presents to the emergency department due to abnormal blood cultures.  Recent admission for sustained sinus tachycardia, thought to be due to dehydration improved after IV fluids.  She has a history of developmental delay as well as hypertension, and diabetes.  Father he was the caregiver reports that patient was otherwise been acting normal since her discharge yesterday, no fevers, and he had no concerns until he was called about the abnormal blood culture results.    The history is provided by a relative. The history is limited by a developmental delay.     Review of patient's allergies indicates:   Allergen Reactions    Azithromycin      interaction with anti-rejection rx    Clarithromycin      interaction with anti rejection rx    Loperamide Other (See Comments)     Drug drug interaction    Milk      diarrhea    Amoxicillin-pot clavulanate Diarrhea     diarrhea     Past Medical History:   Diagnosis Date    Cancer     Diabetes mellitus, type 2     Hypothyroidism     Kidney transplanted     Stroke      No past surgical history on file.  Family History   Adopted: Yes     Social History     Tobacco Use    Smoking status: Never    Smokeless tobacco: Never   Substance Use Topics    Alcohol use: Never    Drug use: Never     Review of Systems   Unable to perform ROS: Patient nonverbal       Physical Exam     Initial Vitals [01/20/24 1848]   BP Pulse Resp Temp SpO2   133/85 (!) 130 (!) 22 98.2 °F (36.8 °C) 98 %      MAP       --         Physical Exam    Nursing note and vitals reviewed.  Constitutional: She appears well-developed and well-nourished.   HENT:   Head: Normocephalic and atraumatic.   Eyes: EOM are normal. Pupils are equal, round, and reactive to light.   Neck: Neck " supple.   Normal range of motion.  Cardiovascular:  Regular rhythm.           Tachycardic   Pulmonary/Chest: Breath sounds normal. No respiratory distress.   Abdominal: Abdomen is soft. There is no abdominal tenderness.   Musculoskeletal:         General: No edema. Normal range of motion.      Cervical back: Normal range of motion and neck supple.     Neurological:   Awake and alert, moaning, nonverbal   Skin: Skin is warm and dry.         ED Course   Procedures  Labs Reviewed   COMPREHENSIVE METABOLIC PANEL - Abnormal; Notable for the following components:       Result Value    Glucose Level 307 (*)     Globulin 3.8 (*)     Albumin/Globulin Ratio 0.9 (*)     All other components within normal limits   MAGNESIUM - Abnormal; Notable for the following components:    Magnesium Level 1.50 (*)     All other components within normal limits   CBC WITH DIFFERENTIAL - Abnormal; Notable for the following components:    WBC 19.14 (*)     MCHC 32.9 (*)     Platelet 523 (*)     Neut # 15.85 (*)     IG# 0.13 (*)     All other components within normal limits   LACTIC ACID, PLASMA - Normal   TSH - Normal   BLOOD CULTURE [facility]   BLOOD CULTURE [facility]   CBC W/ AUTO DIFFERENTIAL    Narrative:     The following orders were created for panel order CBC auto differential.  Procedure                               Abnormality         Status                     ---------                               -----------         ------                     CBC with Differential[[id number]]       Abnormal            Final result                 Please view results for these tests on the individual orders.   URINALYSIS, REFLEX TO URINE CULTURE   STREP GROUP A BY PCR     EKG Readings: (Independently Interpreted)   Initial Reading: No STEMI. Rhythm: Sinus Tachycardia. Heart Rate: 128. Ectopy: No Ectopy. Conduction: Normal. Axis: Right Axis Deviation.       Imaging Results              X-Ray Chest 1 View (Final result)  Result time 01/20/24 20:38:11      Final  result by Mt Jha MD (01/20/24 20:38:11)                   Impression:      Cardiomegaly and mild interstitial thickening which may reflect interstitial edema versus vascular crowding secondary to underinflation.      Electronically signed by: Mt Jha MD  Date:    01/20/2024  Time:    20:38               Narrative:    EXAMINATION:  Single view chest radiograph.    CLINICAL HISTORY:  Sepsis, unspecified organism    TECHNIQUE:  Single view of the chest.    COMPARISON:  Chest radiograph 01/19/2024.    FINDINGS:  The lungs are underinflated.  There is mild interstitial thickening.  There is no pneumothorax.  The cardiac silhouette is enlarged.  There is no acute osseous abnormality.                                       Medications   lactated ringers bolus 1,000 mL (1,000 mLs Intravenous New Bag 1/20/24 2040)     Medical Decision Making  Apparently she was tachycardic at baseline, she was currently around 130.  She was started on IV fluids otherwise vital signs are stable.  EKGs without concerning changes.  CBC shows continued leukocytosis with a white count of 19, Chem panel is unremarkable.  Chest x-ray shows no evidence of pneumonia.  Internal medicine was consulted for further evaluation of her positive blood culture, will admit to the hospital.      Amount and/or Complexity of Data Reviewed  Labs: ordered. Decision-making details documented in ED Course.  Radiology: ordered. Decision-making details documented in ED Course.  ECG/medicine tests: ordered and independent interpretation performed. Decision-making details documented in ED Course.    Risk  Decision regarding hospitalization.                                      Clinical Impression:  Final diagnoses:  [A41.9] Sepsis  [R00.0] Tachycardia  [R78.81] Positive blood culture (Primary)          ED Disposition Condition    Admit Stable                Shubham Rhodes MD  01/20/24 2118

## 2024-01-22 LAB
ALBUMIN SERPL-MCNC: 2.9 G/DL (ref 3.5–5)
ALBUMIN/GLOB SERPL: 0.9 RATIO (ref 1.1–2)
ALP SERPL-CCNC: 80 UNIT/L (ref 40–150)
ALT SERPL-CCNC: 25 UNIT/L (ref 0–55)
AORTIC ROOT ANNULUS: 2.1 CM
AST SERPL-CCNC: 24 UNIT/L (ref 5–34)
AV INDEX (PROSTH): 0.57
AV MEAN GRADIENT: 8 MMHG
AV PEAK GRADIENT: 14 MMHG
AV REGURGITATION PRESSURE HALF TIME: 273.2 MS
AV VALVE AREA BY VELOCITY RATIO: 1.53 CM²
AV VALVE AREA: 1.76 CM²
AV VELOCITY RATIO: 0.5
BACTERIA BLD CULT: ABNORMAL
BACTERIA BLD CULT: ABNORMAL
BASOPHILS # BLD AUTO: 0.04 X10(3)/MCL
BASOPHILS NFR BLD AUTO: 0.2 %
BILIRUB SERPL-MCNC: 0.2 MG/DL
BSA FOR ECHO PROCEDURE: 1.31 M2
BUN SERPL-MCNC: 5.4 MG/DL (ref 7–18.7)
CALCIUM SERPL-MCNC: 8.7 MG/DL (ref 8.4–10.2)
CHLORIDE SERPL-SCNC: 105 MMOL/L (ref 98–107)
CK SERPL-CCNC: 23 U/L (ref 29–168)
CO2 SERPL-SCNC: 21 MMOL/L (ref 22–29)
CREAT SERPL-MCNC: 0.73 MG/DL (ref 0.55–1.02)
CV ECHO LV RWT: 0.4 CM
DOP CALC AO PEAK VEL: 1.85 M/S
DOP CALC AO VTI: 28.5 CM
DOP CALC LVOT AREA: 3.1 CM2
DOP CALC LVOT DIAMETER: 1.98 CM
DOP CALC LVOT PEAK VEL: 0.92 M/S
DOP CALC LVOT STROKE VOLUME: 50.16 CM3
DOP CALC MV VTI: 18.7 CM
DOP CALCLVOT PEAK VEL VTI: 16.3 CM
E WAVE DECELERATION TIME: 220.96 MSEC
E/A RATIO: 1.36
E/E' RATIO: 16.86 M/S
ECHO LV POSTERIOR WALL: 0.79 CM (ref 0.6–1.1)
EOSINOPHIL # BLD AUTO: 0.41 X10(3)/MCL (ref 0–0.9)
EOSINOPHIL NFR BLD AUTO: 2.2 %
ERYTHROCYTE [DISTWIDTH] IN BLOOD BY AUTOMATED COUNT: 12.2 % (ref 11.5–17)
FRACTIONAL SHORTENING: 26 % (ref 28–44)
GFR SERPLBLD CREATININE-BSD FMLA CKD-EPI: >60 MLS/MIN/1.73/M2
GLOBULIN SER-MCNC: 3.4 GM/DL (ref 2.4–3.5)
GLUCOSE SERPL-MCNC: 202 MG/DL (ref 74–100)
GRAM STN SPEC: ABNORMAL
HAV IGM SERPL QL IA: NONREACTIVE
HBV CORE IGM SERPL QL IA: NONREACTIVE
HBV SURFACE AB SER-ACNC: 0.56 MIU/ML
HBV SURFACE AB SERPL IA-ACNC: NONREACTIVE M[IU]/ML
HBV SURFACE AG SERPL QL IA: NONREACTIVE
HCT VFR BLD AUTO: 40 % (ref 37–47)
HCV AB SERPL QL IA: NONREACTIVE
HGB BLD-MCNC: 13.1 G/DL (ref 12–16)
HOLD SPECIMEN: NORMAL
HR MV ECHO: 107 BPM
IMM GRANULOCYTES # BLD AUTO: 0.13 X10(3)/MCL (ref 0–0.04)
IMM GRANULOCYTES NFR BLD AUTO: 0.7 %
INTERVENTRICULAR SEPTUM: 0.99 CM (ref 0.6–1.1)
LEFT ATRIUM SIZE: 2 CM
LEFT INTERNAL DIMENSION IN SYSTOLE: 2.89 CM (ref 2.1–4)
LEFT VENTRICLE DIASTOLIC VOLUME INDEX: 53.66 ML/M2
LEFT VENTRICLE DIASTOLIC VOLUME: 67.08 ML
LEFT VENTRICLE MASS INDEX: 84 G/M2
LEFT VENTRICLE SYSTOLIC VOLUME INDEX: 25.6 ML/M2
LEFT VENTRICLE SYSTOLIC VOLUME: 32.03 ML
LEFT VENTRICULAR INTERNAL DIMENSION IN DIASTOLE: 3.93 CM (ref 3.5–6)
LEFT VENTRICULAR MASS: 104.99 G
LV LATERAL E/E' RATIO: 14.75 M/S
LV SEPTAL E/E' RATIO: 19.67 M/S
LVOT MG: 2.13 MMHG
LVOT MV: 0.69 CM/S
LYMPHOCYTES # BLD AUTO: 2.44 X10(3)/MCL (ref 0.6–4.6)
LYMPHOCYTES NFR BLD AUTO: 12.8 %
MCH RBC QN AUTO: 30 PG (ref 27–31)
MCHC RBC AUTO-ENTMCNC: 32.8 G/DL (ref 33–36)
MCV RBC AUTO: 91.7 FL (ref 80–94)
MONOCYTES # BLD AUTO: 1.32 X10(3)/MCL (ref 0.1–1.3)
MONOCYTES NFR BLD AUTO: 6.9 %
MV MEAN GRADIENT: 2 MMHG
MV PEAK A VEL: 0.87 M/S
MV PEAK E VEL: 1.18 M/S
MV PEAK GRADIENT: 4 MMHG
MV STENOSIS PRESSURE HALF TIME: 64.08 MS
MV VALVE AREA BY CONTINUITY EQUATION: 2.68 CM2
MV VALVE AREA P 1/2 METHOD: 3.43 CM2
NEUTROPHILS # BLD AUTO: 14.71 X10(3)/MCL (ref 2.1–9.2)
NEUTROPHILS NFR BLD AUTO: 77.2 %
NRBC BLD AUTO-RTO: 0 %
PISA AR MAX VEL: 4.46 M/S
PISA TR MAX VEL: 2.66 M/S
PLATELET # BLD AUTO: 483 X10(3)/MCL (ref 130–400)
PMV BLD AUTO: 10.6 FL (ref 7.4–10.4)
POCT GLUCOSE: 140 MG/DL (ref 70–110)
POCT GLUCOSE: 193 MG/DL (ref 70–110)
POCT GLUCOSE: 220 MG/DL (ref 70–110)
POCT GLUCOSE: 253 MG/DL (ref 70–110)
POTASSIUM SERPL-SCNC: 3.6 MMOL/L (ref 3.5–5.1)
PROT SERPL-MCNC: 6.3 GM/DL (ref 6.4–8.3)
RA PRESSURE ESTIMATED: 3 MMHG
RBC # BLD AUTO: 4.36 X10(6)/MCL (ref 4.2–5.4)
RIGHT VENTRICULAR END-DIASTOLIC DIMENSION: 2.43 CM
RV TB RVSP: 6 MMHG
SODIUM SERPL-SCNC: 140 MMOL/L (ref 136–145)
TDI LATERAL: 0.08 M/S
TDI SEPTAL: 0.06 M/S
TDI: 0.07 M/S
TR MAX PG: 28 MMHG
TV REST PULMONARY ARTERY PRESSURE: 31 MMHG
VANCOMYCIN TROUGH SERPL-MCNC: 23.6 UG/ML (ref 15–20)
WBC # SPEC AUTO: 19.05 X10(3)/MCL (ref 4.5–11.5)
Z-SCORE OF LEFT VENTRICULAR DIMENSION IN END DIASTOLE: -0.77
Z-SCORE OF LEFT VENTRICULAR DIMENSION IN END SYSTOLE: 0.73

## 2024-01-22 PROCEDURE — 63600175 PHARM REV CODE 636 W HCPCS

## 2024-01-22 PROCEDURE — 25000003 PHARM REV CODE 250

## 2024-01-22 PROCEDURE — 85025 COMPLETE CBC W/AUTO DIFF WBC: CPT

## 2024-01-22 PROCEDURE — 99900035 HC TECH TIME PER 15 MIN (STAT)

## 2024-01-22 PROCEDURE — 21400001 HC TELEMETRY ROOM

## 2024-01-22 PROCEDURE — 82550 ASSAY OF CK (CPK): CPT | Performed by: NURSE PRACTITIONER

## 2024-01-22 PROCEDURE — 80053 COMPREHEN METABOLIC PANEL: CPT

## 2024-01-22 PROCEDURE — 80202 ASSAY OF VANCOMYCIN: CPT

## 2024-01-22 PROCEDURE — 86706 HEP B SURFACE ANTIBODY: CPT | Performed by: NURSE PRACTITIONER

## 2024-01-22 PROCEDURE — 80074 ACUTE HEPATITIS PANEL: CPT | Performed by: NURSE PRACTITIONER

## 2024-01-22 RX ORDER — MYCOPHENOLATE MOFETIL 250 MG/1
500 CAPSULE ORAL
Status: DISCONTINUED | OUTPATIENT
Start: 2024-01-23 | End: 2024-01-26 | Stop reason: HOSPADM

## 2024-01-22 RX ORDER — PREDNISOLONE 15 MG/5ML
15 SOLUTION ORAL DAILY
Status: DISCONTINUED | OUTPATIENT
Start: 2024-01-23 | End: 2024-01-25

## 2024-01-22 RX ORDER — TACROLIMUS 0.5 MG/1
0.5 CAPSULE ORAL 2 TIMES DAILY
Status: DISCONTINUED | OUTPATIENT
Start: 2024-01-22 | End: 2024-01-24

## 2024-01-22 RX ORDER — TACROLIMUS 0.5 MG/1
0.5 CAPSULE ORAL EVERY MORNING
Status: DISCONTINUED | OUTPATIENT
Start: 2024-01-27 | End: 2024-01-24

## 2024-01-22 RX ORDER — TACROLIMUS 0.5 MG/1
0.5 CAPSULE ORAL EVERY MORNING
Status: DISCONTINUED | OUTPATIENT
Start: 2024-01-25 | End: 2024-01-24

## 2024-01-22 RX ADMIN — DAPTOMYCIN 300 MG: 350 INJECTION, POWDER, LYOPHILIZED, FOR SOLUTION INTRAVENOUS at 10:01

## 2024-01-22 RX ADMIN — METOPROLOL TARTRATE 25 MG: 25 TABLET, FILM COATED ORAL at 09:01

## 2024-01-22 RX ADMIN — LEVOCARNITINE ORAL SOLUTION 500 MG: 1 SOLUTION ORAL at 09:01

## 2024-01-22 RX ADMIN — INSULIN ASPART 1 UNITS: 100 INJECTION, SOLUTION INTRAVENOUS; SUBCUTANEOUS at 08:01

## 2024-01-22 RX ADMIN — SODIUM CHLORIDE, POTASSIUM CHLORIDE, SODIUM LACTATE AND CALCIUM CHLORIDE: 600; 310; 30; 20 INJECTION, SOLUTION INTRAVENOUS at 08:01

## 2024-01-22 RX ADMIN — ASPIRIN 325 MG ORAL TABLET 325 MG: 325 PILL ORAL at 09:01

## 2024-01-22 RX ADMIN — AMLODIPINE BESYLATE 5 MG: 5 TABLET ORAL at 09:01

## 2024-01-22 RX ADMIN — INSULIN ASPART 4 UNITS: 100 INJECTION, SOLUTION INTRAVENOUS; SUBCUTANEOUS at 05:01

## 2024-01-22 RX ADMIN — VANCOMYCIN HYDROCHLORIDE 750 MG: 750 INJECTION, POWDER, LYOPHILIZED, FOR SOLUTION INTRAVENOUS at 01:01

## 2024-01-22 RX ADMIN — FOLIC ACID 1000 MCG: 1 TABLET ORAL at 09:01

## 2024-01-22 RX ADMIN — ATORVASTATIN CALCIUM 20 MG: 20 TABLET, FILM COATED ORAL at 08:01

## 2024-01-22 NOTE — CONSULTS
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 Upon ER visit, pt was given IVF and blood cultures were drawn. She was then discharged home. Pt was then called to come back to ER on 1/20/24 d/t bacteremia with coag-negative Staph. Blood cultures were noted to be drawn from 2 different sites on 1/19/24 and grew out 2 different coag-negative Staph (Staph epi and Staph capitus) with positive mecA on BCID .  Upon ER return on 1/20/24, blood cultures were again repeated and at present are NGTD x 24 hours. Father is currently with pt. He tells me that pt has not been exhibiting overt signs of illness. The only occasional abnormal symptoms that pt has been having were occasional sweating, constipation, "gagging", bending over a lot and reaching around back, and "stressing" more often. Since pt is nonverbal, assessment is difficult. Pt has been afebrile through course of hospitalization with leukocytosis. Leukocytosis seems to be chronic in nature. Pt has been tachycardic (HR ranging from ). Upon exam, pt is noted with likely lumbar " and thoracic pain with palpation as she moans louder and cries out with palpation with these areas. Pt with no implanted devices / artifical joints / etc. Father tells me that they adopted pt at 3 months old. Biological mother took Depakote and Lithium while pregnant. Pt seems to be well cared for. Pt had renal transplant with donor kidney in 2007 d/t native kidneys being too small; states pt remains with bilateral native kidneys. Noted immunosuppression medications have currently been held. Pt is currently on Vancomycin without any reported issue. ID has been consulted for coag-negative bacteremia in the setting immunosuppression.      Antibiotic history:  Vancomycin IV (trough 15-20) - 1/20/24 to present      Past Medical History/Past Surgical History/Social History     Past Medical History:   Diagnosis Date    Cancer     Diabetes mellitus, type 2     Hypothyroidism     Kidney transplanted     Stroke        No past surgical history on file.     FAMILY HISTORY:  family history is not on file. She was adopted.     SOCIAL HISTORY:   Social History     Socioeconomic History    Marital status: Single   Tobacco Use    Smoking status: Never    Smokeless tobacco: Never   Substance and Sexual Activity    Alcohol use: Never    Drug use: Never    Sexual activity: Never     Social Determinants of Health     Financial Resource Strain: Low Risk  (1/22/2024)    Overall Financial Resource Strain (CARDIA)     Difficulty of Paying Living Expenses: Not hard at all   Food Insecurity: No Food Insecurity (1/22/2024)    Hunger Vital Sign     Worried About Running Out of Food in the Last Year: Never true     Ran Out of Food in the Last Year: Never true   Transportation Needs: Unknown (1/22/2024)    PRAPARE - Transportation     Lack of Transportation (Medical): No   Physical Activity: Inactive (1/22/2024)    Exercise Vital Sign     Days of Exercise per Week: 0 days     Minutes of Exercise per Session: 0 min   Stress: Patient Unable To  Answer (1/22/2024)    [employer] [place] [employer] - Occupational Stress Questionnaire     Feeling of Stress : Patient unable to answer   Social Connections: Unknown (1/22/2024)    Social Connection and Isolation Panel [NHANES]     Frequency of Communication with Friends and Family: Never     Frequency of Social Gatherings with Friends and Family: More than three times a week     Marital Status: Never    Housing Stability: Low Risk  (1/22/2024)    Housing Stability Vital Sign     Unable to Pay for Housing in the Last Year: No     Number of Places Lived in the Last Year: 1     Unstable Housing in the Last Year: No        ALLERGIES:   Review of patient's allergies indicates:   Allergen Reactions    Azithromycin      interaction with anti-rejection rx    Clarithromycin      interaction with anti rejection rx    Loperamide Other (See Comments)     Drug drug interaction    Milk      diarrhea    Amoxicillin-pot clavulanate Diarrhea     diarrhea         Review of Systems     Review of Systems   Unable to perform ROS: Medical condition   Pt nonverbal      MEDICATIONS:   Scheduled Meds:   amLODIPine  5 mg Oral Daily    aspirin  325 mg Per G Tube Daily    atorvastatin  20 mg Oral QHS    folic acid  1,000 mcg Per G Tube Daily    levocarnitine  500 mg Oral BID    metoprolol tartrate  25 mg Oral BID    vancomycin (VANCOCIN) IV (PEDS and ADULTS)  750 mg Intravenous Q12H     Continuous Infusions:   lactated ringers 90 mL/hr at 01/21/24 1826     PRN Meds:.albuterol, dextrose 10%, dextrose 10%, glucagon (human recombinant), glucose, glucose, insulin aspart U-100, naloxone, sodium chloride 0.9%, Pharmacy to dose Vancomycin consult **AND** vancomycin - pharmacy to dose    Physical exam:     Temp:  [97.2 °F (36.2 °C)-99.3 °F (37.4 °C)] 98.2 °F (36.8 °C)  Pulse:  [101-127] 101  Resp:  [16-18] 16  SpO2:  [97 %-99 %] 99 %  BP: (120-141)/(57-92) 128/91     GENERAL: Middle age WF who is awake and alert; happy; does not  appear ill overall; nonverbal    HEENT: atraumatic, anicteric, moist oral mucosa   LUNGS: breathing unlabored, lungs CTA bilateral  HEART: RRR; no obvious murmur, no rub or gallop  ABDOMEN: abdomen soft, nondistended, BS noted x 4 quadrants, no obvious rebound, guarding, or organomegaly; PEG button without erythema/drainage  : no obvious CVA tenderness; diapers  EXTREMITIES: no edema, clubbing, or cyanosis; + muscle wasting  MUSCULOSKELETAL: appears has lumbar / thoracic tenderness with palpation  SKIN: no obvious rashes or lesions  NEURO: nonverbal; LT sided weakness   PSYCH: cooperative  LINES: PIV       LABS:     I have personally reviewed patient's labs.  Pertinent results noted below.    CBC  Recent Labs     01/20/24 1935 01/21/24  0403 01/22/24  0407   WBC 19.14* 14.99* 19.05*   HGB 13.5 11.8* 13.1   HCT 41.0 35.5* 40.0   * 455* 483*       Differential  Recent Labs   Lab 01/22/24 0407   WBC 19.05*   HGB 13.1   HCT 40.0   *        Basic Metabolic Panel  Recent Labs     01/20/24 1935 01/21/24  0403 01/22/24  0407    139 140   K 3.9 3.3* 3.6   CO2 22 21* 21*   BUN 9.0 7.3 5.4*   CREATININE 0.80 0.72 0.73        Hepatic Panel  Lab Results   Component Value Date    ALT 25 01/22/2024    AST 24 01/22/2024    ALKPHOS 80 01/22/2024    BILITOT 0.2 01/22/2024        Urinalysis:  Results for orders placed or performed during the hospital encounter of 01/20/24   Urinalysis, Reflex to Urine Culture    Specimen: Urine   Result Value Ref Range    Color, UA Colorless (A) Yellow, Light-Yellow, Dark Yellow, Shruthi, Straw    Appearance, UA Clear Clear    Specific Gravity, UA 1.005 1.005 - 1.030    pH, UA 7.5 5.0 - 8.5    Protein, UA Trace (A) Negative    Glucose, UA 1+ (A) Negative, Normal    Ketones, UA Negative Negative    Blood, UA Negative Negative    Bilirubin, UA Negative Negative    Urobilinogen, UA Normal 0.2, 1.0, Normal    Nitrites, UA Negative Negative    Leukocyte Esterase, UA Negative Negative     WBC, UA 0-5 None Seen, 0-2, 3-5, 0-5 /HPF    Bacteria, UA None Seen None Seen /HPF    Squamous Epithelial Cells, UA None Seen None Seen /HPF    Hyaline Casts, UA None Seen None Seen /lpf    RBC, UA 0-5 None Seen, 0-2, 3-5, 0-5 /HPF       Estimated Creatinine Clearance: 73.5 mL/min (based on SCr of 0.73 mg/dL).       MICRO AND PATHOLOGY:   Colonization:  1/19/24 Blood cultures x 2 with coag-negative Staph (RT AC with Staph capitis  in 1 of 1 bottle and LT AC with Staph epi in 1 of 2 bottles)  1/20/24 Blood cultures x 2 NGTD    IMAGING:     I have personally reviewed patient's imaging. Pertinent results noted below.  X-Ray Chest 1 View   Final Result      Cardiomegaly and mild interstitial thickening which may reflect interstitial edema versus vascular crowding secondary to underinflation.         Electronically signed by: Mt Jha MD   Date:    01/20/2024   Time:    20:38            IMPRESSION     Coag-negative Staph bacteremia  Immunosuppressed state  History of renal transplant (2007)  Diabetes mellitus  History of Valprotic Acid Syndrome / Mental Delay / Nonverbal  Possible back pain (lumbar / thoracic)  Chronic leukocytosis  Tachycardia      Coag-negative Staph bacteremia in the setting of immunosuppressed pt secondary to renal transplant (2007).   Pt is afebrile and tachycardic with chronic leukocytosis  Blood cultures with coag-negative Staph. Repeat blood cultures NGTD x 24 hours  Even though these bacteria are usually considered contaminants in an immunocompetent individual, they are hard to ignore as pt is with immunocompromised state. Pt is not overtly ill appearing.      RECOMMENDATIONS:    Continue Vancomycin IV (trough 15-20) for now.   May eventually end up switching to Daptomycin as this is more renal friendly. Pt will likely end up with minimum 14 day course of treatment.   Father will discuss with pt mother if they would prefer to do IV therapy in LTAC vs [facility] 5th floor infusion center. Father  knows he does not want to do IV therapy at home as they do not feel comfortable  Follow up repeat blood cultures  Echo is pending; follow up results  Recommend further imaging of lumbar / thoracic area as pt appears to be tender upon exam. This may prove to be challenging. While not the superior test, would recommend CT of above. If this is negative, may need to attempt MRI  Recommend to consult transplant nephrology for guidance on immunosuppressive therapy  Thank you for the consult. We will follow along with you. Please do not hesitate to call with any questions or concerns.       CHANTELL Souza-EDWIN  [facility] Infectious Diseases

## 2024-01-22 NOTE — PROGRESS NOTES
Inpatient Nutrition Assessment    Admit Date: 1/20/2024   Total duration of encounter: 2 days   Patient Age: 28 y.o.    Nutrition Recommendation/Prescription     Home TF regimen--cyclic feeding at night-- Pediasure with fiber--4 cans, 1 1/2 cup baby rice cereal, 2 jar baby food/banana--approx --1520 calories, 35 gm protein, 800 ml free water. Water flush throughout day/night.   MVI/fe  ? Need probiotic /increased diarrhea with antibiotic tx  Biweekly wt  Will monitor nutrition status     Communication of Recommendations: reviewed with nurse and reviewed with caregiver    Nutrition Assessment     Malnutrition Assessment/Nutrition-Focused Physical Exam    Malnutrition Context: chronic illness (01/22/24 1132)  Malnutrition Level: other (see comments) (pt does not meet malnutrition criteria) (01/22/24 1132)           Orbital Region (Subcutaneous Fat Loss): well nourished  Upper Arm Region (Subcutaneous Fat Loss): well nourished           Clavicle Bone Region (Muscle Loss): well nourished  Clavicle and Acromion Bone Region (Muscle Loss): well nourished                          A minimum of two characteristics is recommended for diagnosis of either severe or non-severe malnutrition.    Chart Review    Reason Seen: continuous nutrition monitoring    Malnutrition Screening Tool Results   Have you recently lost weight without trying?: No  Have you been eating poorly because of a decreased appetite?: No   MST Score: 0   Diagnosis:  Sepsis, tachycardia, + blood cultures     Relevant Medical History: post tx DM, thyroid CA, goiter, Renal transplant, stroke, mental delay, hypothyroid, diarrhea     Nutrition-Related Medications: IVF LR @ 90 ml/hr, ASA, atorvastatin, folic acid, vancomycin   Calorie Containing IV Medications: no significant kcals from medications at this time    Nutrition-Related Labs:  (1-22) H/H 13.1/40.0 Gluc 202(H) Bun 5.4 Cr 0.7 K 3.6 Alb 2.9(L)     Nutrition Orders:   Diet NPO      Appetite/Oral Intake:  "NPO/NPO    Factors Affecting Nutritional Intake: impaired cognitive status/motor control and tube feeding     Food/Spiritism/Cultural Preferences: none reported    Food Allergies: none reported    Wound(s):   none    Last Bowel Movement: 24    Comments    () Pt laying in bed; dad at bedside; pt nonverbal; feedings per PEG--dad reviewed feeding regimen--pt on night feeds--approx 3 pm to 7am--receives 4 cans pediasure with fiber, 1 1/2 c rice cereal and 2 jar banana baby food; blenderized together; pt had difficulty with diarrhea in past --reason for addition of rice cereal/banana baby food; Dad reported MD/dietitian developed formula regimen; would like to stay with current formula regimen. Dad reported pt wt stable between 110-115#. Dad does give water flush--approx 3 syringe --several times throughout day. Gluc (H)--hx DM; ? Related to infection; dad did reported blood sugar usually well controlled at home. Pt appears well developed--no fat/muscle wasting. Pt is wheelchair/scooter bound; unable to walk.     Anthropometrics    Height: 4' 1.02" (124.5 cm)    Last Weight: 50 kg (110 lb 3.7 oz) (24) Weight Method: Bed Scale   BMI 32  BMI Classification: obese grade I (BMI 30-34.9)     Ideal Body Weight (IBW), Female: 45.1 lb                          Usual Body Weight (UBW), k kg (pt nonverbal; per dad--pt weighs between 110-115#)  % Usual Body Weight: 100.21     Usual Weight Provided By: family/caregiver    Wt Readings from Last 5 Encounters:   24 50 kg (110 lb 3.7 oz)   24 50 kg (110 lb 3.7 oz)   24 52.2 kg (115 lb)   23 52.2 kg (115 lb)   10/12/21 52.6 kg (115 lb 15.4 oz)     Weight Change(s) Since Admission: no wt loss   Wt Readings from Last 1 Encounters:   24 50 kg (110 lb 3.7 oz)   Admit Weight: 50 kg (110 lb 3.7 oz) (01/20/24 2151), Weight Method: Bed Scale    Estimated Needs    Weight Used For Calorie Calculations: 50 kg (110 lb 3.7 oz)  Energy " Calorie Requirements (kcal): 1350-1500kcal/d; 27-30 panda/kg  Energy Need Method: Kcal/kg  Weight Used For Protein Calculations: 50 kg (110 lb 3.7 oz)  Protein Requirements: 40-50 gm protein/d; 0.8 to 1 gm/kg--hx renal tx  Fluid Requirements (mL): 1350 ml/d; 1ml/panda  Temp (24hrs), Av °F (36.7 °C), Min:97.2 °F (36.2 °C), Max:99.3 °F (37.4 °C)       Enteral Nutrition    Formula:  pediasure with fiber  Rate/Volume: 4 can   Water Flushes: 3 syringe every few hr  Additives/Modulars:  1 1/2 c rice cereal, 2 jar baby food/banana  Route: PEG tube  Method: cyclic  Total Nutrition Provided by Tube Feeding, Additives, and Flushes:  Calories Provided  1520 kcal/d, 104% needs   Protein Provided  35 g/d, 87% needs   Fluid Provided  1520 ml/d, 100% needs       Parenteral Nutrition    Patient not receiving parenteral nutrition support at this time.    Evaluation of Received Nutrient Intake    Calories: meeting estimated needs  Protein: meeting estimated needs    Patient Education    Not applicable.    Nutrition Diagnosis     PES: Altered GI function related to chronic illness as evidenced by peg/diarrhea . (new)    Interventions/Goals     Intervention(s): modified composition of enteral nutrition, modified rate of enteral nutrition, and collaboration with other providers    Goal: Meet greater than 80% of nutritional needs by follow-up. (new)    Monitoring & Evaluation     Dietitian will monitor enteral nutrition intake and electrolyte/renal panel.    Nutrition Risk/Follow-Up: moderate (follow-up in 3-5 days)   Please consult if re-assessment needed sooner.

## 2024-01-22 NOTE — PLAN OF CARE
01/22/24 1050   Discharge Assessment   Assessment Type Discharge Planning Assessment   Confirmed/corrected address, phone number and insurance Yes   Confirmed Demographics Correct on Facesheet   Source of Information family;health record   Reason For Admission Tachycardia  R07.9 Chest pain  R78.81 Positive blood culture  R78.81 Gram-positive cocci bacteremia  A41.9 Sepsis   People in Home sibling(s);parent(s)   Facility Arrived From: Home   Do you expect to return to your current living situation? Yes   Do you have help at home or someone to help you manage your care at home? Yes   Who are your caregiver(s) and their phone number(s)? Wade Velásquez (Father) [phone]; Mother, Shira Velásquez   Prior to hospitilization cognitive status: Alert/Oriented   Current cognitive status: Alert/Oriented   Walking or Climbing Stairs Difficulty yes   Walking or Climbing Stairs ambulation difficulty, requires equipment;ambulation difficulty, assistance 1 person   Mobility Management W/C; Scooter   Dressing/Bathing Difficulty yes   Dressing/Bathing bathing difficulty, requires equipment;bathing difficulty, assistance 1 person   Dressing/Bathing Management Hosp Bed; Josue Lift   Home Accessibility wheelchair accessible   Equipment Currently Used at Home wheelchair;hospital bed;feeding device;lift device  (Homemade Scooter)   Readmission within 30 days? Yes   Patient currently being followed by outpatient case management? No   Do you currently have service(s) that help you manage your care at home? Yes   How Many hours does patient receive services 72   Name and Contact number of agency [facility] 8 hrs/day; Complete   Is the pt/caregiver preference to resume services with current agency Yes   Do you take prescription medications? Yes  ([facility])   Do you have prescription coverage? Yes   Coverage M/D   Do you have any problems affording any of your prescribed medications? No   Is the patient taking  medications as prescribed? yes   Who is going to help you get home at discharge? Father   How do you get to doctors appointments? family or friend will provide   Are you on dialysis? No   Discharge Plan A Home with family   DME Needed Upon Discharge  none   Discharge Plan discussed with: Parent(s)   Name(s) and Number(s) Wade Velásquez (Father) [phone]   Transition of Care Barriers None   Physical Activity   On average, how many days per week do you engage in moderate to strenuous exercise (like a brisk walk)? 0 days   On average, how many minutes do you engage in exercise at this level? 0 min   Financial Resource Strain   How hard is it for you to pay for the very basics like food, housing, medical care, and heating? Not hard   Housing Stability   In the last 12 months, was there a time when you were not able to pay the mortgage or rent on time? N   In the last 12 months, how many places have you lived? 1   In the last 12 months, was there a time when you did not have a steady place to sleep or slept in a shelter (including now)? N   Transportation Needs   In the past 12 months, has lack of transportation kept you from medical appointments or from getting medications? no   Food Insecurity   Within the past 12 months, you worried that your food would run out before you got the money to buy more. Never true   Within the past 12 months, the food you bought just didn't last and you didn't have money to get more. Never true   Stress   Do you feel stress - tense, restless, nervous, or anxious, or unable to sleep at night because your mind is troubled all the time - these days? Pt Unable   Social Connections   In a typical week, how many times do you talk on the phone with family, friends, or neighbors? Never   How often do you get together with friends or relatives? More than 3   Are you , , , , never , or living with a partner? Never marrie   Alcohol Use   Q1: How often do  you have a drink containing alcohol? Never   Q2: How many drinks containing alcohol do you have on a typical day when you are drinking? None   Q3: How often do you have six or more drinks on one occasion? Never   OTHER   Name(s) of People in Home Wade Velásquez (Father) [phone]; Mother, Shira; 6 siblings     Pt single with no children; She & her 5 siblings are all special needs who resides with parents, Wade & Shira Velásquez; Receives 8 hrs/day PCA; CM to follow for d/c planning needs.

## 2024-01-23 ENCOUNTER — PATIENT OUTREACH (OUTPATIENT)
Dept: ADMINISTRATIVE | Facility: CLINIC | Age: 29
End: 2024-01-23
Payer: MEDICARE

## 2024-01-23 PROBLEM — D84.9 IMMUNOCOMPROMISED PATIENT: Status: ACTIVE | Noted: 2024-01-23

## 2024-01-23 PROBLEM — B95.7 COAG NEGATIVE STAPHYLOCOCCUS BACTEREMIA: Status: ACTIVE | Noted: 2024-01-20

## 2024-01-23 PROBLEM — Z94.0 HISTORY OF RENAL TRANSPLANT: Status: ACTIVE | Noted: 2024-01-23

## 2024-01-23 PROBLEM — M54.9 BACK PAIN: Status: ACTIVE | Noted: 2024-01-23

## 2024-01-23 PROBLEM — R00.0 TACHYCARDIA: Status: ACTIVE | Noted: 2024-01-23

## 2024-01-23 LAB
ALBUMIN SERPL-MCNC: 2.8 G/DL (ref 3.5–5)
ALBUMIN/GLOB SERPL: 0.9 RATIO (ref 1.1–2)
ALP SERPL-CCNC: 82 UNIT/L (ref 40–150)
ALT SERPL-CCNC: 49 UNIT/L (ref 0–55)
AST SERPL-CCNC: 45 UNIT/L (ref 5–34)
BASOPHILS # BLD AUTO: 0.04 X10(3)/MCL
BASOPHILS NFR BLD AUTO: 0.3 %
BILIRUB SERPL-MCNC: 0.2 MG/DL
BUN SERPL-MCNC: 4.7 MG/DL (ref 7–18.7)
CALCIUM SERPL-MCNC: 8.8 MG/DL (ref 8.4–10.2)
CHLORIDE SERPL-SCNC: 105 MMOL/L (ref 98–107)
CO2 SERPL-SCNC: 23 MMOL/L (ref 22–29)
CREAT SERPL-MCNC: 0.74 MG/DL (ref 0.55–1.02)
CRP SERPL-MCNC: 37.3 MG/L
EOSINOPHIL # BLD AUTO: 0.47 X10(3)/MCL (ref 0–0.9)
EOSINOPHIL NFR BLD AUTO: 3.6 %
ERYTHROCYTE [DISTWIDTH] IN BLOOD BY AUTOMATED COUNT: 12.3 % (ref 11.5–17)
ERYTHROCYTE [SEDIMENTATION RATE] IN BLOOD: 27 MM/HR (ref 0–20)
GFR SERPLBLD CREATININE-BSD FMLA CKD-EPI: >60 MLS/MIN/1.73/M2
GLOBULIN SER-MCNC: 3.2 GM/DL (ref 2.4–3.5)
GLUCOSE SERPL-MCNC: 201 MG/DL (ref 74–100)
HAV AB SER QL IA: NONREACTIVE
HCT VFR BLD AUTO: 36.4 % (ref 37–47)
HGB BLD-MCNC: 11.8 G/DL (ref 12–16)
HIV 1+2 AB+HIV1 P24 AG SERPL QL IA: NONREACTIVE
IMM GRANULOCYTES # BLD AUTO: 0.11 X10(3)/MCL (ref 0–0.04)
IMM GRANULOCYTES NFR BLD AUTO: 0.8 %
LYMPHOCYTES # BLD AUTO: 2.41 X10(3)/MCL (ref 0.6–4.6)
LYMPHOCYTES NFR BLD AUTO: 18.2 %
MCH RBC QN AUTO: 30.1 PG (ref 27–31)
MCHC RBC AUTO-ENTMCNC: 32.4 G/DL (ref 33–36)
MCV RBC AUTO: 92.9 FL (ref 80–94)
MONOCYTES # BLD AUTO: 1.08 X10(3)/MCL (ref 0.1–1.3)
MONOCYTES NFR BLD AUTO: 8.2 %
NEUTROPHILS # BLD AUTO: 9.11 X10(3)/MCL (ref 2.1–9.2)
NEUTROPHILS NFR BLD AUTO: 68.9 %
NRBC BLD AUTO-RTO: 0 %
PLATELET # BLD AUTO: 514 X10(3)/MCL (ref 130–400)
PMV BLD AUTO: 10 FL (ref 7.4–10.4)
POCT GLUCOSE: 185 MG/DL (ref 70–110)
POCT GLUCOSE: 211 MG/DL (ref 70–110)
POCT GLUCOSE: 217 MG/DL (ref 70–110)
POCT GLUCOSE: 249 MG/DL (ref 70–110)
POCT GLUCOSE: 278 MG/DL (ref 70–110)
POTASSIUM SERPL-SCNC: 3.6 MMOL/L (ref 3.5–5.1)
PROT SERPL-MCNC: 6 GM/DL (ref 6.4–8.3)
RBC # BLD AUTO: 3.92 X10(6)/MCL (ref 4.2–5.4)
SODIUM SERPL-SCNC: 140 MMOL/L (ref 136–145)
T PALLIDUM AB SER QL: NONREACTIVE
WBC # SPEC AUTO: 13.22 X10(3)/MCL (ref 4.5–11.5)

## 2024-01-23 PROCEDURE — 80197 ASSAY OF TACROLIMUS: CPT

## 2024-01-23 PROCEDURE — 86644 CMV ANTIBODY: CPT | Performed by: NURSE PRACTITIONER

## 2024-01-23 PROCEDURE — 25000242 PHARM REV CODE 250 ALT 637 W/ HCPCS

## 2024-01-23 PROCEDURE — 87389 HIV-1 AG W/HIV-1&-2 AB AG IA: CPT | Performed by: NURSE PRACTITIONER

## 2024-01-23 PROCEDURE — 25000003 PHARM REV CODE 250

## 2024-01-23 PROCEDURE — 11000001 HC ACUTE MED/SURG PRIVATE ROOM

## 2024-01-23 PROCEDURE — 63600175 PHARM REV CODE 636 W HCPCS

## 2024-01-23 PROCEDURE — 63600175 PHARM REV CODE 636 W HCPCS: Performed by: STUDENT IN AN ORGANIZED HEALTH CARE EDUCATION/TRAINING PROGRAM

## 2024-01-23 PROCEDURE — 85025 COMPLETE CBC W/AUTO DIFF WBC: CPT

## 2024-01-23 PROCEDURE — 86140 C-REACTIVE PROTEIN: CPT | Performed by: NURSE PRACTITIONER

## 2024-01-23 PROCEDURE — 80053 COMPREHEN METABOLIC PANEL: CPT

## 2024-01-23 PROCEDURE — 86780 TREPONEMA PALLIDUM: CPT | Performed by: NURSE PRACTITIONER

## 2024-01-23 PROCEDURE — 86708 HEPATITIS A ANTIBODY: CPT | Performed by: NURSE PRACTITIONER

## 2024-01-23 PROCEDURE — 85652 RBC SED RATE AUTOMATED: CPT | Performed by: NURSE PRACTITIONER

## 2024-01-23 PROCEDURE — 94761 N-INVAS EAR/PLS OXIMETRY MLT: CPT

## 2024-01-23 PROCEDURE — 99900035 HC TECH TIME PER 15 MIN (STAT)

## 2024-01-23 RX ORDER — INSULIN ASPART 100 [IU]/ML
0-5 INJECTION, SOLUTION INTRAVENOUS; SUBCUTANEOUS EVERY 6 HOURS PRN
Status: DISCONTINUED | OUTPATIENT
Start: 2024-01-23 | End: 2024-01-26 | Stop reason: HOSPADM

## 2024-01-23 RX ORDER — TACROLIMUS 0.5 MG/1
0.5 CAPSULE ORAL EVERY MORNING
Status: COMPLETED | OUTPATIENT
Start: 2024-02-01 | End: 2024-01-24

## 2024-01-23 RX ORDER — TACROLIMUS 0.5 MG/1
0.5 CAPSULE ORAL 2 TIMES DAILY
Status: DISCONTINUED | OUTPATIENT
Start: 2024-01-29 | End: 2024-01-24

## 2024-01-23 RX ORDER — FLUTICASONE PROPIONATE 50 MCG
1 SPRAY, SUSPENSION (ML) NASAL NIGHTLY
Status: DISCONTINUED | OUTPATIENT
Start: 2024-01-23 | End: 2024-01-24

## 2024-01-23 RX ADMIN — INSULIN ASPART 2 UNITS: 100 INJECTION, SOLUTION INTRAVENOUS; SUBCUTANEOUS at 06:01

## 2024-01-23 RX ADMIN — INSULIN ASPART 1 UNITS: 100 INJECTION, SOLUTION INTRAVENOUS; SUBCUTANEOUS at 11:01

## 2024-01-23 RX ADMIN — INSULIN DETEMIR 8 UNITS: 100 INJECTION, SOLUTION SUBCUTANEOUS at 09:01

## 2024-01-23 RX ADMIN — METOPROLOL TARTRATE 25 MG: 25 TABLET, FILM COATED ORAL at 09:01

## 2024-01-23 RX ADMIN — FLUTICASONE PROPIONATE 50 MCG: 50 SPRAY, METERED NASAL at 09:01

## 2024-01-23 RX ADMIN — ASPIRIN 325 MG ORAL TABLET 325 MG: 325 PILL ORAL at 09:01

## 2024-01-23 RX ADMIN — DAPTOMYCIN 300 MG: 350 INJECTION, POWDER, LYOPHILIZED, FOR SOLUTION INTRAVENOUS at 09:01

## 2024-01-23 RX ADMIN — AMLODIPINE BESYLATE 5 MG: 5 TABLET ORAL at 09:01

## 2024-01-23 RX ADMIN — TACROLIMUS 0.5 MG: 0.5 CAPSULE ORAL at 06:01

## 2024-01-23 RX ADMIN — MYCOPHENOLATE MOFETIL 500 MG: 250 CAPSULE ORAL at 09:01

## 2024-01-23 RX ADMIN — LEVOCARNITINE ORAL SOLUTION 500 MG: 1 SOLUTION ORAL at 09:01

## 2024-01-23 RX ADMIN — INSULIN ASPART 4 UNITS: 100 INJECTION, SOLUTION INTRAVENOUS; SUBCUTANEOUS at 06:01

## 2024-01-23 RX ADMIN — FOLIC ACID 1000 MCG: 1 TABLET ORAL at 09:01

## 2024-01-23 RX ADMIN — ATORVASTATIN CALCIUM 20 MG: 20 TABLET, FILM COATED ORAL at 09:01

## 2024-01-23 NOTE — PLAN OF CARE
Problem: Skin Injury Risk Increased  Goal: Skin Health and Integrity  Outcome: Ongoing, Progressing     Problem: Adult Inpatient Plan of Care  Goal: Plan of Care Review  Outcome: Ongoing, Progressing  Goal: Patient-Specific Goal (Individualized)  Outcome: Ongoing, Progressing  Goal: Absence of Hospital-Acquired Illness or Injury  Outcome: Ongoing, Progressing  Goal: Optimal Comfort and Wellbeing  Outcome: Ongoing, Progressing  Goal: Readiness for Transition of Care  Outcome: Ongoing, Progressing     Problem: Infection  Goal: Absence of Infection Signs and Symptoms  Outcome: Ongoing, Progressing     Problem: Diabetes Comorbidity  Goal: Blood Glucose Level Within Targeted Range  Outcome: Ongoing, Progressing

## 2024-01-23 NOTE — PROGRESS NOTES
"[facility] Medicine Wards Progress Note     Resident Team: [facility] Medicine List 2  Attending Physician: Ender Borges MD    Subjective:      Brief HPI:  28 y.o. female with PMHx of Renal transplant on immunosuppressants, DM-II, HTN, HLD, Papillary thyroid carcinoma s/p hemithyroidectomy with resulting Hypothyroidism, CVA with left-sided deficits, developmental delay, and dysphagia with PEG tube was sent to the ED with her father for sepsis work-up. Patient was admitted yesterday for tachycardia of 150bpm, improved with IVF hydration, and was discharged home earlier today. Father was called to bring the patient back to the ED for positive blood cultures that were obtained during admission. Father denies any new or worsening symptoms since discharge. He reports 1 episode of diarrhea today after increasing fluid intake due to hard stools. He reports bowl movements typically fluctuate between hard and soft stools. He denies recent fever, cough, congestion, vomiting, blood in stool, rashes, and changes in baseline mentation. Patient is nonverbal at baseline.     In ED, patient is afebrile, normotensive, and tachycardic with pulse in 130s. Labs reveal WBC of 19.1, which appears at patient's baseline, stable H/H at 13.5/41.0, Lactic acid of 1.9, Glucose of 307, and slight hypomagnesemia at 1.5. Pt was given LR 1L bolus in ED. Repeat blood cultures were drawn. Internal medicine consulted for further management.    Interval History:   Remains afebrile overnight, father reports no acute events.  Still tachycardic however family states she was tachycardic at home as well. Leukocytosis increased to 19k last night.     Objective:     Last 24 Hour Vital Signs:  BP  Min: 123/58  Max: 141/76  Temp  Av.3 °F (36.8 °C)  Min: 97.2 °F (36.2 °C)  Max: 99.3 °F (37.4 °C)  Pulse  Av.3  Min: 77  Max: 127  Resp  Av.4  Min: 16  Max: 18  SpO2  Av.6 %  Min: 94 %  Max: 99 %  Height  Av' 1.02" (124.5 cm)  Min: 4' 1.02" (124.5 " "cm)  Max: 4' 1.02" (124.5 cm)  Weight  Av.9 kg (110 lb)  Min: 49.9 kg (110 lb)  Max: 49.9 kg (110 lb)  I/O last 3 completed shifts:  In: 4610.5 [I.V.:3215.5; NG/GT:800; IV Piggyback:595]  Out: -     Physical Examination:  General: Nonverbal at baseline, not in acute distress  HEENT: NC/AT, conjunctiva normal, moist mucus membranes  CVS: RRR, no LE edema  Abdomen: soft, non-distended; PEG tube in place, insertion site clean and dry with no surrounding erythema.  MSK: No obvious deformity or joint swelling  Skin: Warm and dry, no open wounds or ulcers  Neuro: Awake and alert      Laboratory:  Most Recent Data:  CBC:   Lab Results   Component Value Date    WBC 19.05 (H) 2024    HGB 13.1 2024    HCT 40.0 2024     (H) 2024    MCV 91.7 2024    RDW 12.2 2024     WBC Differential:   Recent Labs   Lab 24  1234 24  1935 24  0403 24  0407   WBC 18.02* 19.14* 14.99* 19.05*   HGB 12.7 13.5 11.8* 13.1   HCT 38.4 41.0 35.5* 40.0   * 523* 455* 483*   MCV 92.5 90.5 91.0 91.7     BMP:   Lab Results   Component Value Date     2024    K 3.6 2024     2023    CO2 21 (L) 2024    BUN 5.4 (L) 2024    CREATININE 0.73 2024    CALCIUM 8.7 2024    MG 1.50 (L) 2024    PHOS 3.0 2024     LFTs:   Lab Results   Component Value Date    ALBUMIN 2.9 (L) 2024    BILITOT 0.2 2024    AST 24 2024    ALKPHOS 80 2024    ALT 25 2024     Coags:   Lab Results   Component Value Date    INR 0.9 10/22/2021     FLP:   Lab Results   Component Value Date    CHOL 121 2023    HDL 35 (L) 2023    LDLCALC 18 2023    TRIG 341 (H) 2023     DM:   Lab Results   Component Value Date    HGBA1C 8.8 (H) 2024    HGBA1C 8.8 (H) 10/06/2022    HGBA1C 7.4 (H) 2022    LDLCALC 18 2023    CREATININE 0.73 2024     Thyroid:   Lab Results   Component Value Date    TSH " 0.357 01/20/2024    X2KBHCS 179 01/06/2023        Cardiac:   Lab Results   Component Value Date    TROPONINI <0.01 12/22/2023    BNP 32 07/13/2021         Microbiology Data:  Microbiology Results (last 7 days)       Procedure Component Value Units Date/Time    Chlamydia/GC, PCR [[id number]]     Order Status: Sent Specimen: Urine     Blood culture #1 **CANNOT BE ORDERED STAT** [[id number]]  (Normal) Collected: 01/20/24 1935    Order Status: Completed Specimen: Blood Updated: 01/22/24 0901     CULTURE, BLOOD ([facility]) No Growth At 24 Hours    Blood culture #2 **CANNOT BE ORDERED STAT** [[id number]]  (Normal) Collected: 01/20/24 1935    Order Status: Completed Specimen: Blood Updated: 01/22/24 0901     CULTURE, BLOOD ([facility]) No Growth At 24 Hours             Other Results:    Radiology:  Imaging Results              X-Ray Chest 1 View (Final result)  Result time 01/20/24 20:38:11      Final result by Mt Jha MD (01/20/24 20:38:11)                   Impression:      Cardiomegaly and mild interstitial thickening which may reflect interstitial edema versus vascular crowding secondary to underinflation.      Electronically signed by: Mt Jha MD  Date:    01/20/2024  Time:    20:38               Narrative:    EXAMINATION:  Single view chest radiograph.    CLINICAL HISTORY:  Sepsis, unspecified organism    TECHNIQUE:  Single view of the chest.    COMPARISON:  Chest radiograph 01/19/2024.    FINDINGS:  The lungs are underinflated.  There is mild interstitial thickening.  There is no pneumothorax.  The cardiac silhouette is enlarged.  There is no acute osseous abnormality.                                      Current Medications:     Infusions:   lactated ringers 90 mL/hr at 01/22/24 1817        Scheduled:   amLODIPine  5 mg Oral Daily    aspirin  325 mg Per G Tube Daily    atorvastatin  20 mg Oral QHS    folic acid  1,000 mcg Per G Tube Daily    levocarnitine  500 mg Oral BID    metoprolol tartrate  25 mg Oral BID     vancomycin (VANCOCIN) IV (PEDS and ADULTS)  750 mg Intravenous Q12H        PRN:  albuterol, dextrose 10%, dextrose 10%, glucagon (human recombinant), glucose, glucose, insulin aspart U-100, naloxone, sodium chloride 0.9%, Pharmacy to dose Vancomycin consult **AND** vancomycin - pharmacy to dose    Assessment & Plan:     SIRS (2/4) (tachycardia, leukocytosis)  Gram positive cocci bacteremia  - Blood cultures collected 1/19 with 1 bottle positive Staph epidermidis  - BioFire MecA positive  - Repeat blood cultures 1/20 NG at 24 hours  - Vancomycin initiated 1/20  - Echo with EF 55-60% normal with exception of trace tricuspid regurgitation, no vegetations visualized  - ID following: CK 23, will switch to daptomycin 6 mg/kg IV qd (initiated 1/23), appreciate assistance    Hx of renal transplant  - Follows Dr. George Bryan, recs are below  - Prednisolone 15 mg qd  - Cellcept 500 mg qd on Tuesday and Thursday  - Prograft 0.5 mg BID on M/T/W, 0.5 mg qd on Th/Sat  - Prograft level ordered 1/22      HTN  - Continue home Norvasc 5 mg qd    DM  - Low dose SSI with accuchecks      CODE STATUS:  Full  Access:  PIV  Antibiotics:  Daptomycin  Diet: PEG tube feeds per family  DVT ppx: SCDs  GI ppx: none  Fluids: LR @ 90 cc/hr      Disposition: Admitted for bacteremia currently on daptomycin    Haresh Cash MD  [facility] - PGY-II

## 2024-01-23 NOTE — PROGRESS NOTES
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 Upon ER visit, pt was given IVF and blood cultures were drawn. She was then discharged home. Pt was then called to come back to ER on 1/20/24 d/t bacteremia with coag-negative Staph. Blood cultures were noted to be drawn from 2 different sites on 1/19/24 and grew out 2 different coag-negative Staph (Staph epi and Staph capitus) with positive mecA on BCID .  Upon ER return on 1/20/24, blood cultures were again repeated and at present are NGTD x 24 hours. Father is currently with pt. He tells me that pt has not been exhibiting overt signs of illness. The only occasional abnormal symptoms that pt has been having were occasional sweating, constipation, "gagging", bending over a lot and reaching around back, and "stressing" more often. Since pt is nonverbal, assessment is difficult. Pt has been afebrile through course of hospitalization with leukocytosis. Leukocytosis seems to be chronic in nature. Pt has been tachycardic (HR ranging from ). Upon exam, pt is noted with likely lumbar and thoracic pain with palpation as she moans louder and cries out with palpation with these areas. Pt with no implanted devices / artifical joints / etc. Father tells me that they adopted pt at 3 months old. Biological mother took Depakote and Lithium while pregnant. Pt seems to " "be well cared for. Pt had renal transplant with donor kidney in 2007 d/t native kidneys being too small; states pt remains with bilateral native kidneys. Noted immunosuppression medications have currently been held. Pt is currently on Vancomycin without any reported issue. ID has been consulted for coag-negative bacteremia in the setting immunosuppression.       Interval History:  Patient lying in bed.  Father at bedside.  Patient is happy and smiling.  Father states she has been herself, except he states he hears a "rattle" in her chest.  Noted with coarse upper respiratory airway sounds.  Afebrile with improving leukocytosis. Still with tachycardia. Inflammatory markers are elevated, along with AST at 85.  TTE was negative for valvular vegetations.  Noted immunosuppressants were restarted.  Vancomycin was changed to Daptomycin to which patient appears to be tolerating without issue. Father would like to do outpatient infusions here at [facility] infusion center (instead of home) when ready for discharge.      Antibiotic History:  Vancomycin IV (trough 15-20) - 1/20/24 to 1/22/24  Daptomycin 6 mg/kg IV daily - 1/22/24 to present      MEDICATIONS:   Reviewed in EMR    REVIEW OF SYSTEMS:   Except as documented, all other systems reviewed and negative     PHYSICAL EXAM:   T 98.2 °F (36.8 °C)   BP (!) 149/79   P (!) 117   RR 18   O2 95 %  GENERAL: Middle age WF with mental delay who is awake and alert; happy; does not appear ill overall; nonverbal    HEENT: atraumatic, anicteric, moist oral mucosa   LUNGS: breathing unlabored, lungs with a lot of course upper airway sounds and clear in bases  HEART: tachycardic with  bpm apical; no obvious murmur, no rub or gallop  ABDOMEN: abdomen soft, nondistended, BS noted x 4 quadrants, no obvious rebound, guarding, or organomegaly; PEG button without erythema/drainage  : no obvious CVA tenderness; diapers  EXTREMITIES: no edema, clubbing, or cyanosis; + muscle " "wasting  MUSCULOSKELETAL: appears has lumbar / thoracic tenderness with palpation  SKIN: no obvious rashes or lesions  NEURO: nonverbal; LT sided weakness   PSYCH: cooperative  LINES: PIV     LABS AND IMAGING:     Recent Labs     01/22/24 0407 01/23/24 0311   WBC 19.05* 13.22*   RBC 4.36 3.92*   HGB 13.1 11.8*   HCT 40.0 36.4*   MCV 91.7 92.9   MCH 30.0 30.1   MCHC 32.8* 32.4*   RDW 12.2 12.3   * 514*     Recent Labs     01/20/24 1935   LACTIC 1.9     No results for input(s): "INR", "APTT", "D-DIMER" in the last 72 hours.  No results for input(s): "HGBA1C", "CHOL", "TRIG", "LDL", "VLDL", "HDL" in the last 72 hours.   Recent Labs     01/20/24 1935 01/21/24 0403 01/22/24 0407 01/23/24 0311      < > 140 140   K 3.9   < > 3.6 3.6   CHLORIDE 102   < > 105 105   CO2 22   < > 21* 23   BUN 9.0   < > 5.4* 4.7*   CREATININE 0.80   < > 0.73 0.74   GLUCOSE 307*   < > 202* 201*   CALCIUM 9.2   < > 8.7 8.8   MG 1.50*  --   --   --    ALBUMIN 3.5   < > 2.9* 2.8*   GLOBULIN 3.8*   < > 3.4 3.2   ALKPHOS 90   < > 80 82   ALT 33   < > 25 49   AST 30   < > 24 45*   BILITOT 0.5   < > 0.2 0.2   CRP  --   --   --  37.30*   TSH 0.357  --   --   --     < > = values in this interval not displayed.     Recent Labs     01/22/24 0407   CPK 23*          Estimated Creatinine Clearance: 72.5 mL/min (based on SCr of 0.74 mg/dL).   Lab Results   Component Value Date    SEDRATE 27 (H) 01/23/2024      Lab Results   Component Value Date    CRP 37.30 (H) 01/23/2024        Micro:   Colonization:  1/19/24 Blood cultures x 2 with coag-negative Staph (RT AC with Staph capitis  in 1 of 1 bottle and LT AC with Staph epi in 1 of 2 bottles)  1/20/24 Blood cultures x 2 NGTD      Echo    Left Ventricle: The left ventricle is normal in size. Ventricular mass   is normal. Normal wall thickness. Normal wall motion. There is normal   systolic function with a visually estimated ejection fraction of 55 - 60%.   There is normal diastolic function.   "  Right Ventricle: Normal right ventricular cavity size. Systolic   function is normal.    Left Atrium: Normal left atrial size.    Right Atrium: Normal right atrial size.    Aortic Valve: The aortic valve is probably tricuspid. There is no   stenosis. There is mild aortic regurgitation.    Mitral Valve: The mitral valve is structurally normal. There is no   stenosis. There is no significant regurgitation.    Tricuspid Valve: There is trace regurgitation.    Aorta: Aortic root is normal in size.    Pulmonary Artery: No pulmonary hypertension. The estimated pulmonary   artery systolic pressure is 31 mmHg.    IVC/SVC: Normal venous pressure at 3 mmHg.    Pericardium: Epicardial fat is visualized. There is no pericardial   effusion.          ASSESSMENT & PLAN:   Coag-negative Staph bacteremia  Immunosuppressed state  History of renal transplant (2007)  Diabetes mellitus  History of Valprotic Acid Syndrome / Mental Delay / Nonverbal  Possible back pain (lumbar / thoracic)  Chronic leukocytosis  Tachycardia        Coag-negative Staph bacteremia in the setting of immunosuppressed pt secondary to renal transplant (2007).   Pt is afebrile and tachycardic with improved leukocytosis  Blood cultures with coag-negative Staph. Repeat blood cultures NGTD x 48 hours  TTE 1/22/24 negative for valvular vegetations  Restarted on immunosuppressive therapy  Even though these bacteria are usually considered contaminants in an immunocompetent individual, they are hard to ignore as pt is with immunocompromised state. Pt is not overtly ill appearing.        RECOMMENDATIONS:     Continue Daptomycin 6 mg/kg IV daily for coag-negative Staph bacteremia  Can place midline once blood cultures are negative at 72 hours  Still recommend further imaging of lumbar / thoracic area as pt appears to be tender upon exam. This may prove to be challenging. While not the superior test, would recommend CT for above. If this is negative, will likely need to  attempt MRI  Pt will need to be set up on  [facility] 5th floor infusion center when ready for discharge. Family does not feel comfortable doing IV therapy at home. Case management to assist with this   ID will continue to follow      CHANTELL Souza  [facility] Infectious Diseases

## 2024-01-23 NOTE — H&P
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  Endocrinology visit was unable to be completed at that time.  Patient was discharged the next day, blood cultures that were drawn ended up being positive for Staph epidermidis and patient was readmitted to the hospital at that time.  Endocrinology consulted for management of diabetes and history of thyroid cancer.  Patient remains nonverbal so history is difficult to obtain, does appear comfortable on exam.  Father is in room with her and mother is on phone at same time. They say patient remains on tube feeds at night time, have been doing the same regimen in the hospital. Patient is able to get all medications through PEG tube without issue. They state patient has been stable at home, they have not noticed any recent issues or new symptoms. They have no other acute concerns at this time    Past Medical History:   Diagnosis Date    Cancer     Diabetes mellitus, type 2     Hypothyroidism     Kidney transplanted     Stroke      No past surgical history on file.    Social History     Socioeconomic History    Marital status: Single   Tobacco Use    Smoking status: Never    Smokeless tobacco: Never   Substance and Sexual Activity    Alcohol use: Never    Drug use: Never    Sexual activity: Never     Social Determinants of Health      Financial Resource Strain: Low Risk  (1/22/2024)    Overall Financial Resource Strain (CARDIA)     Difficulty of Paying Living Expenses: Not hard at all   Food Insecurity: No Food Insecurity (1/22/2024)    Hunger Vital Sign     Worried About Running Out of Food in the Last Year: Never true     Ran Out of Food in the Last Year: Never true   Transportation Needs: Unknown (1/22/2024)    PRAPARE - Transportation     Lack of Transportation (Medical): No   Physical Activity: Inactive (1/22/2024)    Exercise Vital Sign     Days of Exercise per Week: 0 days     Minutes of Exercise per Session: 0 min   Stress: Patient Unable To Answer (1/22/2024)    [employer] [place] [employer] - Occupational Stress Questionnaire     Feeling of Stress : Patient unable to answer   Social Connections: Unknown (1/22/2024)    Social Connection and Isolation Panel [NHANES]     Frequency of Communication with Friends and Family: Never     Frequency of Social Gatherings with Friends and Family: More than three times a week     Marital Status: Never    Housing Stability: Low Risk  (1/22/2024)    Housing Stability Vital Sign     Unable to Pay for Housing in the Last Year: No     Number of Places Lived in the Last Year: 1     Unstable Housing in the Last Year: No     Current Outpatient Medications   Medication Instructions    albuterol (PROVENTIL/VENTOLIN HFA) 90 mcg/actuation inhaler 2 puffs, Inhalation, Every 6 hours PRN    amLODIPine (NORVASC) 5 mg, Oral    aspirin 325 mg, Per G Tube    atorvastatin (LIPITOR) 20 mg, Oral, Nightly    azelastine (ASTELIN) 137 mcg (0.1 %) nasal spray USE ONE SPRAY IN EACH NOSTRIL IN THE MORNING AND BEFORE BEDTIME    BASAGLAR KWIKPEN U-100 INSULIN 10 Units, Subcutaneous, Nightly    cetirizine (ZYRTEC) 10 mg, Oral    FLOVENT HFA 44 mcg/actuation inhaler Inhalation    folic acid (FOLVITE) 1,000 mcg, Oral    glipiZIDE (GLUCOTROL) 10 mg, Oral, 2 times daily with meals    ipratropium (ATROVENT) 42 mcg  "(0.06 %) nasal spray 2 sprays, Each Nostril    ketoconazole (NIZORAL) 2 % shampoo Topical, Three times weekly    lansoprazole (PREVACID SOLUTAB) 30 MG disintegrating tablet Oral    levocarnitine (CARNITOR) 100 mg/mL Soln 500 mg, Oral, 2 times daily    levothyroxine (SYNTHROID) 125 MCG tablet Take half of a 125mcg (62.5mcg) tablet daily.    LEVSIN/SL 0.125 mg, Oral, 2 times daily PRN    metoprolol tartrate (LOPRESSOR) 25 MG tablet TAKE ONE TABLET BY MOUTH IN THE MORNING AND ONE TABLET BEFORE BEDTIME    multivitamin Liqd 5 mLs, FEEDING TUBE    mupirocin (BACTROBAN) 2 % ointment APPLY TOPICALLY THREE TIMES DAILY AS NEEDED FOR REDNESS AT PEG SITE    mycophenolate mofetil (CELLCEPT) 200 mg/mL SusR TAKE 2.5 MLS VIA PEG TWICE DAILY    ondansetron (ZOFRAN) 8 mg, Per G Tube    pen needle, diabetic 31 gauge x 3/16" Ndle Use daily    prednisoLONE (ORAPRED) 15 mg, Oral    tacrolimus (PROGRAF) 0.5 MG Cap take 2 tabs q am every MON, TUES, WED  TAKE 1 TAB Q PM ON MON, TUE, WED  TAKE 1 TAB BID Q TH, FR, SA & SUN    TRADJENTA 5 mg, Oral, Daily    triamcinolone acetonide 0.025% (KENALOG) 0.025 % cream Topical, 2 times daily PRN    TRUEPLUS PEN NEEDLE 31 gauge x 3/16" Ndle USE DAILY AS DIRECTED    white petrolatum (AQUAPHOR HEALING) 41 % Oint Topical   Current Inpatient Medications   amLODIPine  5 mg Oral Daily    aspirin  325 mg Per G Tube Daily    atorvastatin  20 mg Oral QHS    DAPTOmycin (CUBICIN) IV (PEDS and ADULTS)  6 mg/kg Intravenous Q24H    folic acid  1,000 mcg Per G Tube Daily    levocarnitine  500 mg Oral BID    metoprolol tartrate  25 mg Oral BID    mycophenolate  500 mg Oral Every Tues, Thurs    prednisoLONE  15 mg Per G Tube Daily    tacrolimus  0.5 mg Oral BID    [START ON 1/25/2024] tacrolimus  0.5 mg Oral Daily AM    [START ON 1/27/2024] tacrolimus  0.5 mg Oral Daily AM    [START ON 1/29/2024] tacrolimus  0.5 mg Oral BID    [START ON 2/1/2024] tacrolimus  0.5 mg Oral Daily AM     Review of Systems   Unable to " perform ROS: Patient nonverbal        Objective:       Intake/Output Summary (Last 24 hours) at 1/23/2024 1105  Last data filed at 1/23/2024 0610  Gross per 24 hour   Intake 3348.92 ml   Output --   Net 3348.92 ml     Vital Signs (Most Recent):  Temp: 98.2 °F (36.8 °C) (01/23/24 0722)  Pulse: (!) 117 (01/23/24 0722)  Resp: 18 (01/23/24 0317)  BP: (!) 149/79 (01/23/24 0940)  SpO2: 95 % (01/23/24 0755)  Body mass index is 32.19 kg/m².  Weight: 49.9 kg (110 lb) Vital Signs (24h Range):  Temp:  [98 °F (36.7 °C)-98.7 °F (37.1 °C)] 98.2 °F (36.8 °C)  Pulse:  [] 117  Resp:  [16-18] 18  SpO2:  [94 %-99 %] 95 %  BP: (128-149)/(70-91) 149/79     Physical Exam  Constitutional:       General: She is awake. She is not in acute distress.     Appearance: She is obese.      Comments: Non verbal at baseline   HENT:      Head: Atraumatic.      Comments: Congenital facial abnormality noted  Cardiovascular:      Rate and Rhythm: Tachycardia present.      Pulses: Normal pulses.      Heart sounds: No murmur heard.  Abdominal:      General: Abdomen is flat.      Palpations: Abdomen is soft.      Comments: PEG tube in place   Musculoskeletal:      Thoracic back: Tenderness present.      Lumbar back: Tenderness present.      Right lower leg: No edema.      Left lower leg: No edema.   Skin:     General: Skin is warm.   Neurological:      Mental Status: She is alert. Mental status is at baseline.       Lines/Drains/Airways       Drain  Duration                  Gastrostomy/Enterostomy LUQ feeding -- days              Peripheral Intravenous Line  Duration                  Peripheral IV - Single Lumen 01/20/24 2039 20 G Left 2 days                Significant Labs:  Lab Results   Component Value Date    WBC 13.22 (H) 01/23/2024    HGB 11.8 (L) 01/23/2024    HCT 36.4 (L) 01/23/2024    MCV 92.9 01/23/2024     (H) 01/23/2024   BMP  Lab Results   Component Value Date     01/23/2024    K 3.6 01/23/2024     12/24/2023    CO2  23 01/23/2024    BUN 4.7 (L) 01/23/2024    CREATININE 0.74 01/23/2024    CALCIUM 8.8 01/23/2024    ANIONGAP 10 12/24/2023    ESTGFRAFRICA 125 12/24/2023    EGFRNONAA 74 (L) 10/12/2021   ABG  Recent Labs   Lab 01/19/24  1252   PH 7.430*   PO2 169.0*   PCO2 37.0*   HCO3 24.6     Assessment/Plan:     Type 2 diabetes secondary to immunosuppressants after kidney transplant  - Last A1c 8.8, up from 7.4 one year ago  -patient remains on continuous tube feeds at night when she is at home, have been doing the same while inpatient   -blood sugars have remained elevated since admission, currently on moderate dose sliding scale and CBGs checked AC/HS  -home regimen includes glipizide 10 mg b.i.d., Tradjenta 5 mg daily, Lantus 10 units nightly  -previously was on metformin but had this discontinued 2/2 history of renal transplant  - Levemir 8 units nightly in addition to switching sliding scale to low-dose   -will see her sugars respond to steroid administration over the next 24 hours   -will re-evaluate tomorrow and adjust as necessary    History of papillary cell carcinoma s/p hemithyroidectomy   Postsurgical hypothyroidism  -will eventually need to repeat thyroid ultrasound to monitor nodule, last on was performed in October of 2022  -at home is on levothyroxine 62.5 mcg daily, not currently receiving during hospital stay  -restarting home levothyroxine  -checking labs to include thyroglobulin antibody and TSH   -will place order for thyroid ultrasound for continued monitoring in setting of past papillary cell carcinoma      [facility]

## 2024-01-24 PROBLEM — M85.80 OSTEOPENIA: Status: ACTIVE | Noted: 2024-01-24

## 2024-01-24 LAB
ALBUMIN SERPL-MCNC: 2.7 G/DL (ref 3.5–5)
ALBUMIN/GLOB SERPL: 0.8 RATIO (ref 1.1–2)
ALP SERPL-CCNC: 94 UNIT/L (ref 40–150)
ALT SERPL-CCNC: 35 UNIT/L (ref 0–55)
AST SERPL-CCNC: 20 UNIT/L (ref 5–34)
BASOPHILS # BLD AUTO: 0.04 X10(3)/MCL
BASOPHILS NFR BLD AUTO: 0.4 %
BILIRUB SERPL-MCNC: 0.2 MG/DL
BUN SERPL-MCNC: 6.6 MG/DL (ref 7–18.7)
CALCIUM SERPL-MCNC: 8.9 MG/DL (ref 8.4–10.2)
CHLORIDE SERPL-SCNC: 104 MMOL/L (ref 98–107)
CO2 SERPL-SCNC: 25 MMOL/L (ref 22–29)
CREAT SERPL-MCNC: 0.86 MG/DL (ref 0.55–1.02)
DEPRECATED CALCIDIOL+CALCIFEROL SERPL-MC: 43.2 NG/ML (ref 30–80)
EOSINOPHIL # BLD AUTO: 0.41 X10(3)/MCL (ref 0–0.9)
EOSINOPHIL NFR BLD AUTO: 3.6 %
ERYTHROCYTE [DISTWIDTH] IN BLOOD BY AUTOMATED COUNT: 12.1 % (ref 11.5–17)
GFR SERPLBLD CREATININE-BSD FMLA CKD-EPI: >60 MLS/MIN/1.73/M2
GLOBULIN SER-MCNC: 3.2 GM/DL (ref 2.4–3.5)
GLUCOSE SERPL-MCNC: 279 MG/DL (ref 74–100)
HCT VFR BLD AUTO: 36.4 % (ref 37–47)
HGB BLD-MCNC: 11.7 G/DL (ref 12–16)
IMM GRANULOCYTES # BLD AUTO: 0.11 X10(3)/MCL (ref 0–0.04)
IMM GRANULOCYTES NFR BLD AUTO: 1 %
LYMPHOCYTES # BLD AUTO: 1.86 X10(3)/MCL (ref 0.6–4.6)
LYMPHOCYTES NFR BLD AUTO: 16.4 %
MCH RBC QN AUTO: 29.8 PG (ref 27–31)
MCHC RBC AUTO-ENTMCNC: 32.1 G/DL (ref 33–36)
MCV RBC AUTO: 92.9 FL (ref 80–94)
MONOCYTES # BLD AUTO: 1.15 X10(3)/MCL (ref 0.1–1.3)
MONOCYTES NFR BLD AUTO: 10.1 %
NEUTROPHILS # BLD AUTO: 7.77 X10(3)/MCL (ref 2.1–9.2)
NEUTROPHILS NFR BLD AUTO: 68.5 %
NRBC BLD AUTO-RTO: 0 %
PLATELET # BLD AUTO: 464 X10(3)/MCL (ref 130–400)
PMV BLD AUTO: 9.9 FL (ref 7.4–10.4)
POCT GLUCOSE: 303 MG/DL (ref 70–110)
POCT GLUCOSE: 331 MG/DL (ref 70–110)
POCT GLUCOSE: 343 MG/DL (ref 70–110)
POCT GLUCOSE: 391 MG/DL (ref 70–110)
POTASSIUM SERPL-SCNC: 3.9 MMOL/L (ref 3.5–5.1)
PROT SERPL-MCNC: 5.9 GM/DL (ref 6.4–8.3)
RBC # BLD AUTO: 3.92 X10(6)/MCL (ref 4.2–5.4)
SODIUM SERPL-SCNC: 137 MMOL/L (ref 136–145)
TACROLIMUS TROUGH BLD-MCNC: 1.2 NG/ML
TSH SERPL-ACNC: 1.45 UIU/ML (ref 0.35–4.94)
WBC # SPEC AUTO: 11.34 X10(3)/MCL (ref 4.5–11.5)

## 2024-01-24 PROCEDURE — 25000003 PHARM REV CODE 250

## 2024-01-24 PROCEDURE — 63600175 PHARM REV CODE 636 W HCPCS

## 2024-01-24 PROCEDURE — 82306 VITAMIN D 25 HYDROXY: CPT | Performed by: INTERNAL MEDICINE

## 2024-01-24 PROCEDURE — 94761 N-INVAS EAR/PLS OXIMETRY MLT: CPT

## 2024-01-24 PROCEDURE — 11000001 HC ACUTE MED/SURG PRIVATE ROOM

## 2024-01-24 PROCEDURE — 99900035 HC TECH TIME PER 15 MIN (STAT)

## 2024-01-24 PROCEDURE — 63600175 PHARM REV CODE 636 W HCPCS: Performed by: STUDENT IN AN ORGANIZED HEALTH CARE EDUCATION/TRAINING PROGRAM

## 2024-01-24 PROCEDURE — 86800 THYROGLOBULIN ANTIBODY: CPT | Performed by: STUDENT IN AN ORGANIZED HEALTH CARE EDUCATION/TRAINING PROGRAM

## 2024-01-24 PROCEDURE — 63600175 PHARM REV CODE 636 W HCPCS: Performed by: INTERNAL MEDICINE

## 2024-01-24 PROCEDURE — 84443 ASSAY THYROID STIM HORMONE: CPT | Performed by: STUDENT IN AN ORGANIZED HEALTH CARE EDUCATION/TRAINING PROGRAM

## 2024-01-24 PROCEDURE — 25000003 PHARM REV CODE 250: Performed by: STUDENT IN AN ORGANIZED HEALTH CARE EDUCATION/TRAINING PROGRAM

## 2024-01-24 PROCEDURE — 25000242 PHARM REV CODE 250 ALT 637 W/ HCPCS

## 2024-01-24 PROCEDURE — 80053 COMPREHEN METABOLIC PANEL: CPT

## 2024-01-24 PROCEDURE — 85025 COMPLETE CBC W/AUTO DIFF WBC: CPT

## 2024-01-24 RX ORDER — TACROLIMUS 0.5 MG/1
0.5 CAPSULE ORAL EVERY EVENING
Status: DISCONTINUED | OUTPATIENT
Start: 2024-01-30 | End: 2024-01-25

## 2024-01-24 RX ORDER — FLUTICASONE PROPIONATE 50 MCG
2 SPRAY, SUSPENSION (ML) NASAL DAILY
Status: DISCONTINUED | OUTPATIENT
Start: 2024-01-24 | End: 2024-01-26 | Stop reason: HOSPADM

## 2024-01-24 RX ORDER — TACROLIMUS 0.5 MG/1
0.5 CAPSULE ORAL EVERY MORNING
Status: COMPLETED | OUTPATIENT
Start: 2024-01-25 | End: 2024-01-25

## 2024-01-24 RX ORDER — TACROLIMUS 0.5 MG/1
0.5 CAPSULE ORAL EVERY MORNING
Status: DISCONTINUED | OUTPATIENT
Start: 2024-01-29 | End: 2024-01-25

## 2024-01-24 RX ORDER — FLUMAZENIL 0.1 MG/ML
0.2 INJECTION INTRAVENOUS ONCE AS NEEDED
Status: DISCONTINUED | OUTPATIENT
Start: 2024-01-24 | End: 2024-01-26 | Stop reason: HOSPADM

## 2024-01-24 RX ORDER — TACROLIMUS 0.5 MG/1
0.5 CAPSULE ORAL EVERY MORNING
Status: DISCONTINUED | OUTPATIENT
Start: 2024-01-27 | End: 2024-01-25

## 2024-01-24 RX ORDER — ALPRAZOLAM 0.25 MG/1
0.25 TABLET ORAL ONCE
Status: DISCONTINUED | OUTPATIENT
Start: 2024-01-24 | End: 2024-01-26

## 2024-01-24 RX ORDER — ALPRAZOLAM 0.25 MG/1
0.25 TABLET ORAL ONCE
Status: DISCONTINUED | OUTPATIENT
Start: 2024-01-24 | End: 2024-01-24

## 2024-01-24 RX ADMIN — INSULIN ASPART 5 UNITS: 100 INJECTION, SOLUTION INTRAVENOUS; SUBCUTANEOUS at 05:01

## 2024-01-24 RX ADMIN — INSULIN ASPART 4 UNITS: 100 INJECTION, SOLUTION INTRAVENOUS; SUBCUTANEOUS at 06:01

## 2024-01-24 RX ADMIN — LEVOCARNITINE ORAL SOLUTION 500 MG: 1 SOLUTION ORAL at 09:01

## 2024-01-24 RX ADMIN — TACROLIMUS 0.5 MG: 0.5 CAPSULE ORAL at 09:01

## 2024-01-24 RX ADMIN — FLUTICASONE PROPIONATE 100 MCG: 50 SPRAY, METERED NASAL at 02:01

## 2024-01-24 RX ADMIN — INSULIN ASPART 2 UNITS: 100 INJECTION, SOLUTION INTRAVENOUS; SUBCUTANEOUS at 08:01

## 2024-01-24 RX ADMIN — LEVOTHYROXINE SODIUM 62.5 MCG: 25 TABLET ORAL at 09:01

## 2024-01-24 RX ADMIN — ATORVASTATIN CALCIUM 20 MG: 20 TABLET, FILM COATED ORAL at 08:01

## 2024-01-24 RX ADMIN — AMLODIPINE BESYLATE 5 MG: 5 TABLET ORAL at 09:01

## 2024-01-24 RX ADMIN — DAPTOMYCIN 300 MG: 350 INJECTION, POWDER, LYOPHILIZED, FOR SOLUTION INTRAVENOUS at 09:01

## 2024-01-24 RX ADMIN — FOLIC ACID 1000 MCG: 1 TABLET ORAL at 09:01

## 2024-01-24 RX ADMIN — INSULIN DETEMIR 20 UNITS: 100 INJECTION, SOLUTION SUBCUTANEOUS at 08:01

## 2024-01-24 RX ADMIN — METOPROLOL TARTRATE 25 MG: 25 TABLET, FILM COATED ORAL at 09:01

## 2024-01-24 RX ADMIN — INSULIN ASPART 4 UNITS: 100 INJECTION, SOLUTION INTRAVENOUS; SUBCUTANEOUS at 12:01

## 2024-01-24 RX ADMIN — PREDNISOLONE 15 MG: 15 SOLUTION ORAL at 09:01

## 2024-01-24 RX ADMIN — ASPIRIN 325 MG ORAL TABLET 325 MG: 325 PILL ORAL at 09:01

## 2024-01-24 RX ADMIN — METOPROLOL TARTRATE 25 MG: 25 TABLET, FILM COATED ORAL at 08:01

## 2024-01-24 NOTE — PROGRESS NOTES
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 Upon ER visit, pt was given IVF and blood cultures were drawn. She was then discharged home. Pt was then called to come back to ER on 1/20/24 d/t bacteremia with coag-negative Staph. Blood cultures were noted to be drawn from 2 different sites on 1/19/24 and grew out 2 different coag-negative Staph (Staph epi and Staph capitus) with positive mecA on BCID .  Upon ER return on 1/20/24, blood cultures were again repeated and at present are NGTD x 24 hours. Father is currently with pt. He tells me that pt has not been exhibiting overt signs of illness. The only occasional abnormal symptoms that pt has been having were occasional sweating, constipation, "gagging", bending over a lot and reaching around back, and "stressing" more often. Since pt is nonverbal, assessment is difficult. Pt has been afebrile through course of hospitalization with leukocytosis. Leukocytosis seems to be chronic in nature. Pt has been tachycardic (HR ranging from ). Upon exam, pt is noted with likely lumbar and thoracic pain with palpation as she moans louder and cries out with palpation with these areas. Pt with no implanted devices / artifical joints / etc. Father tells me that they adopted pt at 3 months old. Biological mother took Depakote and Lithium while pregnant. Pt seems to " "be well cared for. Pt had renal transplant with donor kidney in 2007 d/t native kidneys being too small; states pt remains with bilateral native kidneys. Noted immunosuppression medications have currently been held. Pt is currently on Vancomycin without any reported issue. ID has been consulted for coag-negative bacteremia in the setting immunosuppression.       Interval History:  Patient lying in bed. Father at bedside. Patient is happy and smiling. Father states she had a good night. He also states that since she has been here and on IV antibiotics, she has not been "stressing" like she was at home. Pt remains afebrile and now leukocytosis has resolved. Still with occasional tachycardia. Repeat blood cultures from 1/20/24 NGTD x 72 hours. CT of L-spine and T-spine with no mention of osteo/abscess, only really states about bone demineralization. Back remains tender in same spots with palpation again upon exam. Remains on immunosuppressant mediations.  Pt on Daptomycin and appears to be tolerating without issue. Father would like to do outpatient infusions here at [facility] infusion center (instead of home) when ready for discharge.      Antibiotic History:  Vancomycin IV (trough 15-20) - 1/20/24 to 1/22/24  Daptomycin 6 mg/kg IV daily - 1/22/24 to present      MEDICATIONS:   Reviewed in EMR    REVIEW OF SYSTEMS:   Except as documented, all other systems reviewed and negative     PHYSICAL EXAM:   T 99.1 °F (37.3 °C)   /75   P (!) 118   RR 18   O2 98 %  GENERAL: Middle age WF with mental delay who is awake and alert; happy; does not appear ill overall; nonverbal    HEENT: atraumatic, anicteric, moist oral mucosa   LUNGS: breathing unlabored, lungs with a lot of course upper airway sounds and clear in bases  HEART: RRR; no obvious murmur, no rub or gallop  ABDOMEN: abdomen soft, nondistended, BS noted x 4 quadrants, no obvious rebound, guarding, or organomegaly; PEG button without erythema/drainage  : no obvious " "CVA tenderness; diapers  EXTREMITIES: no edema, clubbing, or cyanosis; + muscle wasting  MUSCULOSKELETAL: appears has lumbar / thoracic tenderness with palpation again today  SKIN: no obvious rashes or lesions  NEURO: nonverbal; LT sided weakness   PSYCH: cooperative  LINES: PIV     LABS AND IMAGING:     Recent Labs     01/23/24 0311 01/24/24 0315   WBC 13.22* 11.34   RBC 3.92* 3.92*   HGB 11.8* 11.7*   HCT 36.4* 36.4*   MCV 92.9 92.9   MCH 30.1 29.8   MCHC 32.4* 32.1*   RDW 12.3 12.1   * 464*     No results for input(s): "LACTIC" in the last 72 hours.    No results for input(s): "INR", "APTT", "D-DIMER" in the last 72 hours.  No results for input(s): "HGBA1C", "CHOL", "TRIG", "LDL", "VLDL", "HDL" in the last 72 hours.   Recent Labs     01/23/24 0311 01/24/24 0315    137   K 3.6 3.9   CHLORIDE 105 104   CO2 23 25   BUN 4.7* 6.6*   CREATININE 0.74 0.86   GLUCOSE 201* 279*   CALCIUM 8.8 8.9   ALBUMIN 2.8* 2.7*   GLOBULIN 3.2 3.2   ALKPHOS 82 94   ALT 49 35   AST 45* 20   BILITOT 0.2 0.2   CRP 37.30*  --    TSH  --  1.446     Recent Labs     01/22/24  0407   CPK 23*          Estimated Creatinine Clearance: 62.4 mL/min (based on SCr of 0.86 mg/dL).   Lab Results   Component Value Date    SEDRATE 27 (H) 01/23/2024      Lab Results   Component Value Date    CRP 37.30 (H) 01/23/2024        Micro:   Colonization:  1/19/24 Blood cultures x 2 with coag-negative Staph (RT AC with Staph capitis  in 1 of 1 bottle and LT AC with Staph epi in 1 of 2 bottles)  1/20/24 Blood cultures x 2 NGTD      CT Lumbar Spine Without Contrast  EXAMINATION  CT LUMBAR SPINE WITHOUT CONTRAST    CLINICAL HISTORY  Low back pain, infection suspected;    TECHNIQUE  Helical-acquisition CT images were obtained without the intravenous administration of iodine-based contrast media.  Multiplanar reconstructions were accomplished by a CT technologist at a separate workstation and pushed to PACS for physician review.    COMPARISON  None " available at the time of initial interpretation.    FINDINGS  Images were reviewed in soft tissue and bone windows.    Exam quality: adequate for evaluation    Vertebral bodies: Extensive demineralization of the thoracic column is noted, limiting sensitivity for detection of subtle nondisplaced fractures. Within limitations, no acutely displaced fracture is visualized. No compression deformity or focal vertebral body abnormality. No evidence of traumatic subluxation. No focal contour irregularity or otherwise suspicious abnormality identified through the course of the lumbar spinal canal.    Disc spaces: Intervertebral disc heights are preserved.    Curvature: Within normal limits.    Bony pelvis: Sacroiliac joints are symmetric and congruent. The visualized pelvic structures are without acute displacement or suspicious focal abnormality.    Other findings: No prevertebral thickening, expansile collection, or focal contour irregularity. No acute muscular abnormality. No evidence of acute process within the visualized abdomen.  Incidentally noted bilateral renal agenesis versus severe atrophy.    IMPRESSION  1. Widespread appearance of lumbar spine demineralization with no convincing evidence of acute or focal abnormality.  2. Additional secondary details discussed above.    RADIATION DOSE  Automated tube current modulation, weight-based exposure dosing, and/or iterative reconstruction technique utilized to reach lowest reasonably achievable exposure rate.    DLP: 1314 mGy*cm    Electronically signed by: Orlando Patel  Date:    01/23/2024  Time:    17:37  CT Thoracic Spine Without Contrast  EXAMINATION  CT THORACIC SPINE WITHOUT CONTRAST    CLINICAL HISTORY  Mid-back pain;    TECHNIQUE  Helical-acquisition CT images were obtained without the use of intravenous contrast.  Multiplanar reconstructions accomplished by a CT technologist at a separate workstation and pushed to PACS for physician review.    COMPARISON  None  available at the time of initial interpretation.    FINDINGS  Images were reviewed and soft tissue and bone windows.    Exam quality: Adequate for spinal column assessment. (Evaluation of the spinal canal contents and nonosseous tissues is inherently limited by the absence intrathecal/intravascular contrast administration.)    Vertebral segments: Extensive demineralization of the thoracic column is noted, limiting sensitivity for detection of subtle nondisplaced fractures.  Within limitations, no acutely displaced fracture is visualized.  No compression deformity or focal vertebral body abnormality.  No evidence of traumatic subluxation.  No focal contour irregularity or otherwise suspicious abnormality identified through the course of the thoracic spinal canal.    Disc spaces: Intervertebral disc heights are preserved.    Curvature: Within normal limits.    Other findings: No thickening or focal contour irregularity of the prevertebral soft tissues.  Visualized paraspinal musculature is unremarkable.  The partially included thoracic/abdominal cavity is without evidence of acute or suspicious focal CT abnormality.  Regional vasculature is grossly normal for limited assessment.    IMPRESSION  1. No convincing evidence of acute thoracic spine abnormality.  2. Extensive loss of bone mineral density.    RADIATION DOSE  Automated tube current modulation, weight-based exposure dosing, and/or iterative reconstruction technique utilized to reach lowest reasonably achievable exposure rate.    DLP: 951.9 mGy*cm    Electronically signed by: Orlando Patel  Date:    01/23/2024  Time:    17:34  US Thyroid  Narrative: EXAMINATION:  US THYROID    CLINICAL HISTORY:  history of papillary thyroid carcinoma s/p hemithyroidectomy;.  Bacteremia    TECHNIQUE:  Ultrasound of the thyroid and cervical lymph nodes was performed.    COMPARISON:  10/31/2022    FINDINGS:  Technically limited exam due to body habitus.  Suboptimal  visualization.    Postop right hemithyroidectomy.  Questionable residual parenchyma at the right thyroid bed measuring 1.9 x 1.5 x 1.3 cm.  Left lobe measures 2 x 1.7 x 1.1 cm.  No appreciable thyroid nodule.  No abscess.  Impression: Suboptimal exam.  Previously seen thyroid nodule not appreciable on today's exam.  Questionable remnant thyroid tissue versus fat or nodule at the right thyroid bed.  Suggest outpatient follow-up thyroid sonogram after resolution of patient's acute illness.    Electronically signed by: Chiquis Enrique  Date:    01/23/2024  Time:    15:48          ASSESSMENT & PLAN:   Coag-negative Staph bacteremia  Immunosuppressed state  History of renal transplant (2007)  Diabetes mellitus  History of Valprotic Acid Syndrome / Mental Delay / Nonverbal  Possible back pain (lumbar / thoracic)  Leukocytosis (resolved)  Tachycardia        Coag-negative Staph bacteremia in the setting of immunosuppressed pt secondary to renal transplant (2007).   Pt is afebrile and occasional tachycardia. Now with resolved leukocytosis  Blood cultures with coag-negative Staph. Repeat blood cultures NGTD   TTE 1/22/24 negative for valvular vegetations  CT L-spine and T-spine with no acute abnormalities, but does show concerns for bone demineralization  Restarted on immunosuppressive therapy  Even though these bacteria are usually considered contaminants in an immunocompetent individual, they are hard to ignore as pt is with immunocompromised state. Pt is not overtly ill appearing.  Pt appears to be showing clinical improvement        RECOMMENDATIONS:     Continue Daptomycin 6 mg/kg IV daily for coag-negative Staph bacteremia  CT L-spine and T-spine negative. Pt  to back with palpation. Would plan to get MRI L-spine and T-spine (with contrast would be preferred if able). Likely will need light sedation to facilitate pt holding still. Father states pt did well with arms wrapped by side for CT.   Once get results  of MRI, then this will determine further treatment course and need for placing Midline vs PICC  Pt will need to be set up on  [facility] 5th floor infusion center when ready for discharge. Family does not feel comfortable doing IV therapy at home. Case management to assist with this   ID will continue to follow      CHANTELL Souza  [facility] Infectious Diseases

## 2024-01-24 NOTE — MEDICAL/APP STUDENT
ENDOCRINOLOGY CONSULTATION    Date of Consultation: 1/23/2024    Reason for Consultation: Diabetes, Postsurgical Hypothyroidism    SUBJECTIVE  History of Present Illness:  Chiquis Velásquez is a 28 y.o. 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 Endocrine visit was unable to be completed at that time. Patient was discharged the next day, blood cultures that were drawn ended up being positive for Staph epidermidis and patient was readmitted to the hospital at that time. Endocrinology consulted for management of diabetes and history of thyroid cancer.     History limited secondary to patient being nonverbal. Patient's father in room with mother on phone provided history. They reported that the patient had been stable at home and had not noticed any recent changes or new symptoms. They stated patient remained on tube feeds at night and that they had been doing the same regimen while inpatient. Patient able to receive all medications through PEG tube without issue. Parents reported no other acute concerns.    1/24/24: History obtained from patient's father. He reports that overrall patient seems better than yesterday. He states that she is no longer snoring at night and slept well. She had one soft BM since yesterday but denies diarrhea. Denies any acute complaints.    Review of patient's allergies indicates:   Allergen Reactions    Azithromycin      interaction with anti-rejection rx    Clarithromycin      interaction with anti rejection rx    Loperamide Other (See Comments)     Drug drug interaction    Milk      diarrhea    Amoxicillin-pot clavulanate Diarrhea     diarrhea     Review of systems: Unable to perform ROS. Patient  nonverbal.    Past Medical History:   Past Medical History:   Diagnosis Date    Cancer     Diabetes mellitus, type 2     Hypothyroidism     Kidney transplanted     Stroke      Family History: family history is not on file. She was adopted.    Social History:  reports that she has never smoked. She has never used smokeless tobacco. She reports that she does not drink alcohol and does not use drugs.    Home Medications:   Medications Prior to Admission   Medication Sig Dispense Refill Last Dose    albuterol (PROVENTIL/VENTOLIN HFA) 90 mcg/actuation inhaler Inhale 2 puffs into the lungs every 6 (six) hours as needed.       amLODIPine (NORVASC) 5 MG tablet Take 5 mg by mouth.       aspirin 325 MG tablet 325 mg by Per G Tube route.       atorvastatin (LIPITOR) 20 MG tablet Take 20 mg by mouth every evening.       azelastine (ASTELIN) 137 mcg (0.1 %) nasal spray USE ONE SPRAY IN EACH NOSTRIL IN THE MORNING AND BEFORE BEDTIME       BASAGLAR KWIKPEN U-100 INSULIN glargine 100 units/mL SubQ pen Inject 10 Units into the skin every evening.       cetirizine (ZYRTEC) 1 mg/mL syrup Take 10 mg by mouth.       FLOVENT HFA 44 mcg/actuation inhaler Inhale into the lungs.       folic acid (FOLVITE) 1 MG tablet Take 1,000 mcg by mouth.       glipiZIDE (GLUCOTROL) 5 MG tablet Take 2 tablets (10 mg total) by mouth 2 (two) times daily with meals. 120 tablet 1     ipratropium (ATROVENT) 42 mcg (0.06 %) nasal spray 2 sprays by Each Nostril route.       ketoconazole (NIZORAL) 2 % shampoo Apply topically 3 (three) times a week.       lansoprazole (PREVACID SOLUTAB) 30 MG disintegrating tablet Take by mouth.       levocarnitine (CARNITOR) 100 mg/mL Soln Take 500 mg by mouth 2 (two) times daily.       levothyroxine (SYNTHROID) 125 MCG tablet Take half of a 125mcg (62.5mcg) tablet daily. 4 tablet 0     LEVSIN/SL 0.125 mg Subl Take 0.125 mg by mouth 2 (two) times daily as needed.       metoprolol tartrate (LOPRESSOR) 25 MG tablet TAKE ONE TABLET  "BY MOUTH IN THE MORNING AND ONE TABLET BEFORE BEDTIME       multivitamin Liqd 5 mLs by FEEDING TUBE route.       mupirocin (BACTROBAN) 2 % ointment APPLY TOPICALLY THREE TIMES DAILY AS NEEDED FOR REDNESS AT PEG SITE       mycophenolate mofetil (CELLCEPT) 200 mg/mL SusR TAKE 2.5 MLS VIA PEG TWICE DAILY       ondansetron (ZOFRAN) 4 mg/5 mL solution 8 mg by Per G Tube route.       pen needle, diabetic 31 gauge x 3/16" Ndle Use daily       prednisoLONE (ORAPRED) 15 mg/5 mL (3 mg/mL) solution Take 15 mg by mouth.       tacrolimus (PROGRAF) 0.5 MG Cap take 2 tabs q am every MON, TUES, WED  TAKE 1 TAB Q PM ON MON, TUE, WED  TAKE 1 TAB BID Q TH, FR, SA & SUN       TRADJENTA 5 mg Tab tablet Take 1 tablet (5 mg total) by mouth once daily. 30 tablet 0     triamcinolone acetonide 0.025% (KENALOG) 0.025 % cream Apply topically 2 (two) times daily as needed.       TRUEPLUS PEN NEEDLE 31 gauge x 3/16" Ndle USE DAILY AS DIRECTED       white petrolatum (AQUAPHOR HEALING) 41 % Oint Apply topically.        Inpatient Medications:     Current Facility-Administered Medications:     albuterol inhaler 2 puff, 2 puff, Inhalation, Q6H PRN, Gretchen Vance DO    amLODIPine tablet 5 mg, 5 mg, Oral, Daily, Gretchen Vance DO, 5 mg at 01/24/24 0924    aspirin tablet 325 mg, 325 mg, Per G Tube, Daily, Gretchen Vance DO, 325 mg at 01/24/24 0924    atorvastatin tablet 20 mg, 20 mg, Oral, QHS, Gretchen Vance DO, 20 mg at 01/23/24 2110    DAPTOmycin (CUBICIN) 300 mg in sodium chloride 0.9% SolP 50 mL IVPB, 6 mg/kg, Intravenous, Q24H, Haresh Cash MD, Stopped at 01/23/24 2149    dextrose 10% bolus 125 mL 125 mL, 12.5 g, Intravenous, PRN, [place], Gretchen, DO    dextrose 10% bolus 250 mL 250 mL, 25 g, Intravenous, PRN, Gretchen Vance,     fluticasone propionate 50 mcg/actuation nasal spray 50 mcg, 1 spray, Each Nostril, QHS, Gretchen Vance, , 50 mcg at 01/23/24 2109    folic acid tablet 1,000 mcg, 1,000 " mcg, Per G Tube, Daily, Gretchen Vance DO, 1,000 mcg at 01/24/24 0924    glucagon (human recombinant) injection 1 mg, 1 mg, Intramuscular, PRN, Gretchen Vance DO    glucose chewable tablet 16 g, 16 g, Oral, PRN, Gretchen Vance DO    glucose chewable tablet 24 g, 24 g, Oral, PRN, Gretchen Vance DO    insulin aspart U-100 injection 0-5 Units, 0-5 Units, Subcutaneous, Q6H PRN, Fidel Serrano DO, 4 Units at 01/24/24 0607    insulin detemir U-100 injection 8 Units, 8 Units, Subcutaneous, QHS, Fidel Serrano DO, 8 Units at 01/23/24 2110    levocarnitine 100 mg/mL solution 500 mg, 500 mg, Oral, BID, Gretchen Vance DO, 500 mg at 01/23/24 2100    levothyroxine split tablet 62.5 mcg, 62.5 mcg, Oral, Before breakfast, Fidel Serrano DO, 62.5 mcg at 01/24/24 0927    metoprolol tartrate (LOPRESSOR) tablet 25 mg, 25 mg, Oral, BID, Gretchen Vance DO, 25 mg at 01/24/24 0924    mycophenolate capsule 500 mg, 500 mg, Oral, Every Tues, Thurs, Haresh Cash MD, 500 mg at 01/23/24 0940    naloxone 0.4 mg/mL injection 0.02 mg, 0.02 mg, Intravenous, PRN, Gretchen Vance DO    prednisoLONE 15 mg/5 mL syrup 15 mg, 15 mg, Per G Tube, Daily, Haresh Cash MD, 15 mg at 01/24/24 0924    sodium chloride 0.9% flush 10 mL, 10 mL, Intravenous, Q12H PRN, Gretchen Vance DO    tacrolimus capsule 0.5 mg, 0.5 mg, Oral, BID, Haresh Cash MD, 0.5 mg at 01/23/24 1800    [START ON 1/25/2024] tacrolimus capsule 0.5 mg, 0.5 mg, Oral, Daily AM, Haresh Cash MD    [START ON 1/27/2024] tacrolimus capsule 0.5 mg, 0.5 mg, Oral, Daily AM, Haresh Cash MD    [START ON 1/29/2024] tacrolimus capsule 0.5 mg, 0.5 mg, Oral, BID, Haresh Cash MD     OBJECTIVE  Vitals:   Temp:  [97 °F (36.1 °C)-99.1 °F (37.3 °C)] 99.1 °F (37.3 °C)  Pulse:  [] 118  Resp:  [18] 18  SpO2:  [95 %-100 %] 98 %  BP: (118-129)/(73-80) 118/75     Physical Exam  Constitutional:       General: She is awake. She is  not in acute distress.     Appearance: She is obese.      Comments: Patient nonverbal at baseline.   HENT:      Head: Atraumatic.      Comments: Congenital facial abnormalities noted.  Cardiovascular:      Rate and Rhythm: Regular rhythm. Tachycardia present.      Heart sounds: No murmur heard.  Abdominal:      General: Abdomen is flat.      Palpations: Abdomen is soft.      Comments: PEG tube in place   Musculoskeletal:      Right lower leg: No edema.      Left lower leg: No edema.   Skin:     General: Skin is warm and dry.   Neurological:      Mental Status: She is alert. Mental status is at baseline.        Laboratory Data:   Recent Results (from the past 24 hour(s))   POCT glucose    Collection Time: 01/23/24  2:36 PM   Result Value Ref Range    POCT Glucose 249 (H) 70 - 110 mg/dL   POCT glucose    Collection Time: 01/23/24  6:20 PM   Result Value Ref Range    POCT Glucose 211 (H) 70 - 110 mg/dL   POCT glucose    Collection Time: 01/23/24 11:16 PM   Result Value Ref Range    POCT Glucose 217 (H) 70 - 110 mg/dL   Comprehensive Metabolic Panel (CMP)    Collection Time: 01/24/24  3:15 AM   Result Value Ref Range    Sodium Level 137 136 - 145 mmol/L    Potassium Level 3.9 3.5 - 5.1 mmol/L    Chloride 104 98 - 107 mmol/L    Carbon Dioxide 25 22 - 29 mmol/L    Glucose Level 279 (H) 74 - 100 mg/dL    Blood Urea Nitrogen 6.6 (L) 7.0 - 18.7 mg/dL    Creatinine 0.86 0.55 - 1.02 mg/dL    Calcium Level Total 8.9 8.4 - 10.2 mg/dL    Protein Total 5.9 (L) 6.4 - 8.3 gm/dL    Albumin Level 2.7 (L) 3.5 - 5.0 g/dL    Globulin 3.2 2.4 - 3.5 gm/dL    Albumin/Globulin Ratio 0.8 (L) 1.1 - 2.0 ratio    Bilirubin Total 0.2 <=1.5 mg/dL    Alkaline Phosphatase 94 40 - 150 unit/L    Alanine Aminotransferase 35 0 - 55 unit/L    Aspartate Aminotransferase 20 5 - 34 unit/L    eGFR >60 mls/min/1.73/m2   TSH    Collection Time: 01/24/24  3:15 AM   Result Value Ref Range    TSH 1.446 0.350 - 4.940 uIU/mL   CBC with Differential    Collection  Time: 01/24/24  3:15 AM   Result Value Ref Range    WBC 11.34 4.50 - 11.50 x10(3)/mcL    RBC 3.92 (L) 4.20 - 5.40 x10(6)/mcL    Hgb 11.7 (L) 12.0 - 16.0 g/dL    Hct 36.4 (L) 37.0 - 47.0 %    MCV 92.9 80.0 - 94.0 fL    MCH 29.8 27.0 - 31.0 pg    MCHC 32.1 (L) 33.0 - 36.0 g/dL    RDW 12.1 11.5 - 17.0 %    Platelet 464 (H) 130 - 400 x10(3)/mcL    MPV 9.9 7.4 - 10.4 fL    Neut % 68.5 %    Lymph % 16.4 %    Mono % 10.1 %    Eos % 3.6 %    Basophil % 0.4 %    Lymph # 1.86 0.6 - 4.6 x10(3)/mcL    Neut # 7.77 2.1 - 9.2 x10(3)/mcL    Mono # 1.15 0.1 - 1.3 x10(3)/mcL    Eos # 0.41 0 - 0.9 x10(3)/mcL    Baso # 0.04 <=0.2 x10(3)/mcL    IG# 0.11 (H) 0 - 0.04 x10(3)/mcL    IG% 1.0 %    NRBC% 0.0 %   POCT glucose    Collection Time: 01/24/24  5:57 AM   Result Value Ref Range    POCT Glucose 331 (H) 70 - 110 mg/dL     Imaging:  US Thyroid   Final Result      Suboptimal exam.  Previously seen thyroid nodule not appreciable on today's exam.  Questionable remnant thyroid tissue versus fat or nodule at the right thyroid bed.  Suggest outpatient follow-up thyroid sonogram after resolution of patient's acute illness.         Electronically signed by: Chiquis Enrique   Date:    01/23/2024   Time:    15:48      CT Thoracic Spine Without Contrast   Final Result      CT Lumbar Spine Without Contrast   Final Result      X-Ray Chest 1 View   Final Result      Cardiomegaly and mild interstitial thickening which may reflect interstitial edema versus vascular crowding secondary to underinflation.         Electronically signed by: Mt Jha MD   Date:    01/20/2024   Time:    20:38        ASSESS,EMT & PLAN  Impression:   T2DM secondary to immunosuppressant therapy s/p renal transplant  Postsurgical hypothyroidism  Papillary thyroid carcinoma s/p hemithyroidectomy    Plan:   Type 2 diabetes secondary to immunosuppressants after kidney transplant  - Last A1c 8.8, up from 7.4 one year ago  - Patient on continuous tube feeds at night at home, have  been doing the same while inpatient   - Home regimen includes glipizide 10mg BID, Tradjenta 5mg daily, Lantus 10 units nightly  - Previously was on metformin but had this discontinued secondary to history of renal transplant  - Initiated Levemir 8 units nightly in addition to switching sliding scale to low-dose on 1/23  - CBGs ranging from 279-331, anticipated rise in glucose partially due to prednisolone use   - Will increase Levemir to 20 units nightly    History of papillary cell carcinoma s/p hemithyroidectomy   Postsurgical hypothyroidism  - Patient had 5-6mm hypoechoic nodule on left thyroid lobe on thyroid US in 10/2022   - Patient home dose of levothyroxine 62.5 mcg daily, was not initially receiving during hospital stay  - Restarted home levothyroxine 62.5mcg daily on 1/23  - TSH this AM 1.446, thyroglobulin in process  - Thyroid US repeated however exam was suboptimal and nodule was not appreciated, questionable remnant tissue of right thyroid  - Plan to repeat thyroid US on outpatient basis  - Continue levothyroxine 62.5mcg daily    Osteopenia  - Patient's mother reports history of osteopenia but denies bisphosphonate use in the past  - CT Thoracic spine on 1/23 revealed extensive loss of bone mineral density  - Patient on home steroids for immunosuppression after renal transplant  - Ordered Vit D to evaluate for potential deficiency  - Consider DEXA scan on outpatient basis    Thank you very much for your consultation.    Tara Gupta, MS4  [facility] [employer] [facility] [place]

## 2024-01-24 NOTE — PROGRESS NOTES
[facility] Medicine Wards Progress Note     Resident Team: [facility] Medicine List 2  Attending Physician: Ender Borges MD    Subjective:      Brief HPI:  28 y.o. female with PMHx of Renal transplant on immunosuppressants, DM-II, HTN, HLD, Papillary thyroid carcinoma s/p hemithyroidectomy with resulting Hypothyroidism, CVA with left-sided deficits, developmental delay, and dysphagia with PEG tube was sent to the ED with her father for sepsis work-up. Patient was admitted yesterday for tachycardia of 150bpm, improved with IVF hydration, and was discharged home earlier today. Father was called to bring the patient back to the ED for positive blood cultures that were obtained during admission. Father denies any new or worsening symptoms since discharge. He reports 1 episode of diarrhea today after increasing fluid intake due to hard stools. He reports bowl movements typically fluctuate between hard and soft stools. He denies recent fever, cough, congestion, vomiting, blood in stool, rashes, and changes in baseline mentation. Patient is nonverbal at baseline.     In ED, patient is afebrile, normotensive, and tachycardic with pulse in 130s. Labs reveal WBC of 19.1, which appears at patient's baseline, stable H/H at 13.5/41.0, Lactic acid of 1.9, Glucose of 307, and slight hypomagnesemia at 1.5. Pt was given LR 1L bolus in ED. Repeat blood cultures were drawn. Internal medicine consulted for further management.    Interval History:   Doing well with no changes in status. Remains tachycardic, WBC trending down.     Objective:     Last 24 Hour Vital Signs:  BP  Min: 123/80  Max: 149/79  Temp  Av.4 °F (36.9 °C)  Min: 98 °F (36.7 °C)  Max: 98.7 °F (37.1 °C)  Pulse  Av.8  Min: 38  Max: 117  Resp  Av  Min: 18  Max: 18  SpO2  Av.5 %  Min: 95 %  Max: 99 %  I/O last 3 completed shifts:  In: 3348.9 [I.V.:1952.9; NG/GT:900; IV Piggyback:496]  Out: -     Physical Examination:  General: Nonverbal at baseline, not in  acute distress  HEENT: NC/AT, conjunctiva normal, moist mucus membranes  CVS: RRR, no LE edema  Abdomen: soft, non-distended; PEG tube in place, insertion site clean and dry with no surrounding erythema.  MSK: No obvious deformity or joint swelling  Skin: Warm and dry, no open wounds or ulcers  Neuro: Awake and alert      Laboratory:  Most Recent Data:  CBC:   Lab Results   Component Value Date    WBC 13.22 (H) 01/23/2024    HGB 11.8 (L) 01/23/2024    HCT 36.4 (L) 01/23/2024     (H) 01/23/2024    MCV 92.9 01/23/2024    RDW 12.3 01/23/2024     WBC Differential:   Recent Labs   Lab 01/19/24  1234 01/20/24  1935 01/21/24  0403 01/22/24  0407 01/23/24  0311   WBC 18.02* 19.14* 14.99* 19.05* 13.22*   HGB 12.7 13.5 11.8* 13.1 11.8*   HCT 38.4 41.0 35.5* 40.0 36.4*   * 523* 455* 483* 514*   MCV 92.5 90.5 91.0 91.7 92.9     BMP:   Lab Results   Component Value Date     01/23/2024    K 3.6 01/23/2024     12/24/2023    CO2 23 01/23/2024    BUN 4.7 (L) 01/23/2024    CREATININE 0.74 01/23/2024    CALCIUM 8.8 01/23/2024    MG 1.50 (L) 01/20/2024    PHOS 3.0 01/19/2024     LFTs:   Lab Results   Component Value Date    ALBUMIN 2.8 (L) 01/23/2024    BILITOT 0.2 01/23/2024    AST 45 (H) 01/23/2024    ALKPHOS 82 01/23/2024    ALT 49 01/23/2024     Coags:   Lab Results   Component Value Date    INR 0.9 10/22/2021     FLP:   Lab Results   Component Value Date    CHOL 121 12/22/2023    HDL 35 (L) 12/22/2023    LDLCALC 18 12/22/2023    TRIG 341 (H) 12/22/2023     DM:   Lab Results   Component Value Date    HGBA1C 8.8 (H) 01/19/2024    HGBA1C 8.8 (H) 10/06/2022    HGBA1C 7.4 (H) 04/07/2022    LDLCALC 18 12/22/2023    CREATININE 0.74 01/23/2024     Thyroid:   Lab Results   Component Value Date    TSH 0.357 01/20/2024    S6TVBAD 179 01/06/2023        Cardiac:   Lab Results   Component Value Date    TROPONINI <0.01 12/22/2023    BNP 32 07/13/2021         Microbiology Data:  Microbiology Results (last 7 days)        Procedure Component Value Units Date/Time    Blood culture #1 **CANNOT BE ORDERED STAT** [[id number]]  (Normal) Collected: 01/20/24 1935    Order Status: Completed Specimen: Blood Updated: 01/23/24 0900     CULTURE, BLOOD ([facility]) No Growth At 48 Hours    Blood culture #2 **CANNOT BE ORDERED STAT** [[id number]]  (Normal) Collected: 01/20/24 1935    Order Status: Completed Specimen: Blood Updated: 01/23/24 0900     CULTURE, BLOOD ([facility]) No Growth At 48 Hours    Chlamydia/GC, PCR [[id number]]     Order Status: Sent Specimen: Urine              Other Results:    Radiology:  Imaging Results              X-Ray Chest 1 View (Final result)  Result time 01/20/24 20:38:11      Final result by Mt Jha MD (01/20/24 20:38:11)                   Impression:      Cardiomegaly and mild interstitial thickening which may reflect interstitial edema versus vascular crowding secondary to underinflation.      Electronically signed by: Mt Jha MD  Date:    01/20/2024  Time:    20:38               Narrative:    EXAMINATION:  Single view chest radiograph.    CLINICAL HISTORY:  Sepsis, unspecified organism    TECHNIQUE:  Single view of the chest.    COMPARISON:  Chest radiograph 01/19/2024.    FINDINGS:  The lungs are underinflated.  There is mild interstitial thickening.  There is no pneumothorax.  The cardiac silhouette is enlarged.  There is no acute osseous abnormality.                                      Current Medications:     Infusions:         Scheduled:   amLODIPine  5 mg Oral Daily    aspirin  325 mg Per G Tube Daily    atorvastatin  20 mg Oral QHS    DAPTOmycin (CUBICIN) IV (PEDS and ADULTS)  6 mg/kg Intravenous Q24H    folic acid  1,000 mcg Per G Tube Daily    insulin detemir U-100  8 Units Subcutaneous QHS    levocarnitine  500 mg Oral BID    [START ON 1/24/2024] levothyroxine  62.5 mcg Oral Before breakfast    metoprolol tartrate  25 mg Oral BID    mycophenolate  500 mg Oral Every Tues, Thurs    prednisoLONE  15 mg Per G  Tube Daily    tacrolimus  0.5 mg Oral BID    [START ON 1/25/2024] tacrolimus  0.5 mg Oral Daily AM    [START ON 1/27/2024] tacrolimus  0.5 mg Oral Daily AM    [START ON 1/29/2024] tacrolimus  0.5 mg Oral BID    [START ON 2/1/2024] tacrolimus  0.5 mg Oral Daily AM        PRN:  albuterol, dextrose 10%, dextrose 10%, glucagon (human recombinant), glucose, glucose, insulin aspart U-100, naloxone, sodium chloride 0.9%    Assessment & Plan:     SIRS (2/4) (tachycardia, leukocytosis)  Gram positive cocci bacteremia  - Blood cultures collected 1/19 with 1 bottle positive Staph epidermidis  - BioFire MecA positive  - Repeat blood cultures 1/20 NG at 24 hours  - Vancomycin initiated 1/20  - Echo with EF 55-60% normal with exception of trace tricuspid regurgitation, no vegetations visualized  - ID following: daptomycin 6 mg/kg IV qd (initiated 1/23), appreciate assistance  - CT T and L spine pending, will need MRI of same region under sedation if negative    Hx of renal transplant  - Follows Dr. George Bryan, recs are below  - Prednisolone 15 mg qd  - Cellcept 500 mg qd on Tuesday and Thursday  - Prograft 0.5 mg BID on M/T/W, 0.5 mg qd on Th/Sat  - Prograft level pending      HTN  - Continue home Norvasc 5 mg qd    DM  - Endocrinology following: Levemir 8 U qhs  - Low dose SSI with accuchecks      CODE STATUS:  Full  Access:  PIV  Antibiotics:  Daptomycin  Diet: PEG tube feeds per family  DVT ppx: SCDs  GI ppx: none  Fluids: LR @ 90 cc/hr      Disposition: Admitted for bacteremia currently on daptomycin    Haresh Cash MD  [facility] - PGY-II

## 2024-01-24 NOTE — PROGRESS NOTES
[facility] Medicine Wards Progress Note     Resident Team: [facility] Medicine List 2  Attending Physician: Ender Borges MD    Subjective:      Brief HPI:  28 y.o. female with PMHx of Renal transplant on immunosuppressants, DM-II, HTN, HLD, Papillary thyroid carcinoma s/p hemithyroidectomy with resulting Hypothyroidism, CVA with left-sided deficits, developmental delay, and dysphagia with PEG tube was sent to the ED with her father for sepsis work-up. Patient was admitted yesterday for tachycardia of 150bpm, improved with IVF hydration, and was discharged home earlier today. Father was called to bring the patient back to the ED for positive blood cultures that were obtained during admission. Father denies any new or worsening symptoms since discharge. He reports 1 episode of diarrhea today after increasing fluid intake due to hard stools. He reports bowl movements typically fluctuate between hard and soft stools. He denies recent fever, cough, congestion, vomiting, blood in stool, rashes, and changes in baseline mentation. Patient is nonverbal at baseline.     In ED, patient is afebrile, normotensive, and tachycardic with pulse in 130s. Labs reveal WBC of 19.1, which appears at patient's baseline, stable H/H at 13.5/41.0, Lactic acid of 1.9, Glucose of 307, and slight hypomagnesemia at 1.5. Pt was given LR 1L bolus in ED. Repeat blood cultures were drawn. Internal medicine consulted for further management.    Interval History:   Acute events overnight.  White blood cells now upper limit of normal and continuing to downtrend.  Repeat cultures drawn 01/20/2024 with no growth at 72 hours currently on daptomycin.  CT of thoracic and lumbar spine negative for infectious processes, did have chronic findings of degeneration throughout as noted in report.  Due to negative CT will need to obtain MRI today which we will attempt to complete with low-dose Xanax.  If unsuccessful we will need to discuss with family the possibility of  general anesthesia to obtain MRI versus extended IV antimicrobial coverage.    Objective:     Last 24 Hour Vital Signs:  BP  Min: 118/75  Max: 139/84  Temp  Av.4 °F (36.9 °C)  Min: 97 °F (36.1 °C)  Max: 99.1 °F (37.3 °C)  Pulse  Av  Min: 38  Max: 118  Resp  Av  Min: 18  Max: 18  SpO2  Av.7 %  Min: 95 %  Max: 100 %  I/O last 3 completed shifts:  In: 900 [NG/GT:900]  Out: -     Physical Examination:  General: Nonverbal at baseline, not in acute distress. Appears more interactive today.  HEENT: NC/AT, conjunctiva normal, moist mucus membranes  CVS: RRR, no LE edema  Abdomen: soft, non-distended; PEG tube in place, insertion site clean and dry with no surrounding erythema.  MSK: No obvious deformity or joint swelling  Skin: Warm and dry, no open wounds or ulcers  Neuro: Awake and alert      Laboratory:  Most Recent Data:  CBC:   Lab Results   Component Value Date    WBC 11.34 2024    HGB 11.7 (L) 2024    HCT 36.4 (L) 2024     (H) 2024    MCV 92.9 2024    RDW 12.1 2024     WBC Differential:   Recent Labs   Lab 24  1935 24  0403 24  0407 24  0311 24  0315   WBC 19.14* 14.99* 19.05* 13.22* 11.34   HGB 13.5 11.8* 13.1 11.8* 11.7*   HCT 41.0 35.5* 40.0 36.4* 36.4*   * 455* 483* 514* 464*   MCV 90.5 91.0 91.7 92.9 92.9       BMP:   Lab Results   Component Value Date     2024    K 3.9 2024     2023    CO2 25 2024    BUN 6.6 (L) 2024    CREATININE 0.86 2024    CALCIUM 8.9 2024    MG 1.50 (L) 2024    PHOS 3.0 2024     LFTs:   Lab Results   Component Value Date    ALBUMIN 2.7 (L) 2024    BILITOT 0.2 2024    AST 20 2024    ALKPHOS 94 2024    ALT 35 2024     Coags:   Lab Results   Component Value Date    INR 0.9 10/22/2021     FLP:   Lab Results   Component Value Date    CHOL 121 2023    HDL 35 (L) 2023    LDLCALC 18 2023     TRIG 341 (H) 12/22/2023     DM:   Lab Results   Component Value Date    HGBA1C 8.8 (H) 01/19/2024    HGBA1C 8.8 (H) 10/06/2022    HGBA1C 7.4 (H) 04/07/2022    LDLCALC 18 12/22/2023    CREATININE 0.86 01/24/2024     Thyroid:   Lab Results   Component Value Date    TSH 1.446 01/24/2024    [id number] 179 01/06/2023        Cardiac:   Lab Results   Component Value Date    TROPONINI <0.01 12/22/2023    BNP 32 07/13/2021         Microbiology Data:  Microbiology Results (last 7 days)       Procedure Component Value Units Date/Time    Blood culture #1 **CANNOT BE ORDERED STAT** [[id number]]  (Normal) Collected: 01/20/24 1935    Order Status: Completed Specimen: Blood Updated: 01/24/24 0900     CULTURE, BLOOD ([facility]) No Growth At 72 Hours    Blood culture #2 **CANNOT BE ORDERED STAT** [[id number]]  (Normal) Collected: 01/20/24 1935    Order Status: Completed Specimen: Blood Updated: 01/24/24 0900     CULTURE, BLOOD ([facility]) No Growth At 72 Hours    Chlamydia/GC, PCR [[id number]]     Order Status: Sent Specimen: Urine              Other Results:    Radiology:  Imaging Results              X-Ray Chest 1 View (Final result)  Result time 01/20/24 20:38:11      Final result by Mt Jha MD (01/20/24 20:38:11)                   Impression:      Cardiomegaly and mild interstitial thickening which may reflect interstitial edema versus vascular crowding secondary to underinflation.      Electronically signed by: Mt Jha MD  Date:    01/20/2024  Time:    20:38               Narrative:    EXAMINATION:  Single view chest radiograph.    CLINICAL HISTORY:  Sepsis, unspecified organism    TECHNIQUE:  Single view of the chest.    COMPARISON:  Chest radiograph 01/19/2024.    FINDINGS:  The lungs are underinflated.  There is mild interstitial thickening.  There is no pneumothorax.  The cardiac silhouette is enlarged.  There is no acute osseous abnormality.                                      Current Medications:     Infusions:          Scheduled:   amLODIPine  5 mg Oral Daily    aspirin  325 mg Per G Tube Daily    atorvastatin  20 mg Oral QHS    DAPTOmycin (CUBICIN) IV (PEDS and ADULTS)  6 mg/kg Intravenous Q24H    fluticasone propionate  1 spray Each Nostril QHS    folic acid  1,000 mcg Per G Tube Daily    insulin detemir U-100  8 Units Subcutaneous QHS    levocarnitine  500 mg Oral BID    levothyroxine  62.5 mcg Oral Before breakfast    metoprolol tartrate  25 mg Oral BID    mycophenolate  500 mg Oral Every Tues, Thurs    prednisoLONE  15 mg Per G Tube Daily    tacrolimus  0.5 mg Oral BID    [START ON 1/25/2024] tacrolimus  0.5 mg Oral Daily AM    [START ON 1/27/2024] tacrolimus  0.5 mg Oral Daily AM    [START ON 1/29/2024] tacrolimus  0.5 mg Oral BID        PRN:  albuterol, dextrose 10%, dextrose 10%, glucagon (human recombinant), glucose, glucose, insulin aspart U-100, naloxone, sodium chloride 0.9%    Assessment & Plan:     SIRS (2/4) (tachycardia, leukocytosis)  Gram positive cocci bacteremia  - Blood cultures collected 1/19 with 1 bottle positive Staph epidermidis  - BioFire MecA positive  - Repeat blood cultures 1/20 NG at 24 hours  - Vancomycin initiated 1/20  - Echo with EF 55-60% normal with exception of trace tricuspid regurgitation, no vegetations visualized  - ID following: daptomycin 6 mg/kg IV qd (initiated 1/23), appreciate assistance  - CT T and L spine negative for infectious etiology, chronic degenerative changes as per report  - MRI thoracic and lumbar spine ordered, give Xanax 0.25 mg 45-60 minutes prior to MRI, flumazenil 0.2 mg at bedside PRN for reversal    Hx of renal transplant  - Follows Dr. George Bryan, recs are below  - Prednisolone 15 mg qd  - Cellcept 500 mg qd on Tuesday and Thursday  - Prograft 0.5 mg BID on M/T/W, 0.5 mg qd on Th/Sat  - Prograft level pending      HTN  - Continue home Norvasc 5 mg qd    DM  - Endocrinology following: Levemir 8 U qhs  - Low dose SSI with accuchecks    CODE STATUS:   Full  Access:  PIV  Antibiotics:  Daptomycin  Diet: PEG tube feeds per family  DVT ppx: SCDs  GI ppx: none  Fluids: LR @ 90 cc/hr      Disposition: Admitted for bacteremia currently on daptomycin. Attempt to obtain MRI today    Haresh Cash MD  [facility] - PGY-II

## 2024-01-24 NOTE — CARE UPDATE
Spoke with patient's Nephrologist Dr. Bryan regarding further reduction in immunosuppression therapy.     Current regimen:  - Tacrolimus 0.5 mg BID Mon/Tue/Wed  - Tacrolimus 0.5 mg qd Thu/Sat    Will adjust to recommended regimen:  - Tacrolimus 0.5 mg BID Tue  - Tacrolimus 0.5 mg qd Mon/Wed/Thu/Sat  - Advised redraw tacrolimus lvl on 1/29/2024    Haresh Cash MD  [facility] PGY-II

## 2024-01-25 LAB
ALBUMIN SERPL-MCNC: 2.8 G/DL (ref 3.5–5)
ALBUMIN/GLOB SERPL: 0.8 RATIO (ref 1.1–2)
ALP SERPL-CCNC: 90 UNIT/L (ref 40–150)
ALT SERPL-CCNC: 27 UNIT/L (ref 0–55)
AST SERPL-CCNC: 14 UNIT/L (ref 5–34)
BASOPHILS # BLD AUTO: 0.04 X10(3)/MCL
BASOPHILS NFR BLD AUTO: 0.3 %
BILIRUB SERPL-MCNC: 0.2 MG/DL
BUN SERPL-MCNC: 11.2 MG/DL (ref 7–18.7)
CALCIUM SERPL-MCNC: 9.3 MG/DL (ref 8.4–10.2)
CHLORIDE SERPL-SCNC: 103 MMOL/L (ref 98–107)
CK SERPL-CCNC: 18 U/L (ref 29–168)
CMV IGG SERPL QL IA: NEGATIVE
CMV IGM SERPL QL IA: NEGATIVE
CO2 SERPL-SCNC: 25 MMOL/L (ref 22–29)
CREAT SERPL-MCNC: 0.95 MG/DL (ref 0.55–1.02)
CRP SERPL-MCNC: 27.9 MG/L
ENDOCRINOLOGIST REVIEW: ABNORMAL
EOSINOPHIL # BLD AUTO: 0.22 X10(3)/MCL (ref 0–0.9)
EOSINOPHIL NFR BLD AUTO: 1.6 %
ERYTHROCYTE [DISTWIDTH] IN BLOOD BY AUTOMATED COUNT: 11.9 % (ref 11.5–17)
ERYTHROCYTE [SEDIMENTATION RATE] IN BLOOD: 53 MM/HR (ref 0–20)
GFR SERPLBLD CREATININE-BSD FMLA CKD-EPI: >60 MLS/MIN/1.73/M2
GLOBULIN SER-MCNC: 3.5 GM/DL (ref 2.4–3.5)
GLUCOSE SERPL-MCNC: 307 MG/DL (ref 74–100)
HCT VFR BLD AUTO: 36 % (ref 37–47)
HGB BLD-MCNC: 11.7 G/DL (ref 12–16)
IMM GRANULOCYTES # BLD AUTO: 0.11 X10(3)/MCL (ref 0–0.04)
IMM GRANULOCYTES NFR BLD AUTO: 0.8 %
LYMPHOCYTES # BLD AUTO: 2.51 X10(3)/MCL (ref 0.6–4.6)
LYMPHOCYTES NFR BLD AUTO: 17.8 %
MCH RBC QN AUTO: 29.8 PG (ref 27–31)
MCHC RBC AUTO-ENTMCNC: 32.5 G/DL (ref 33–36)
MCV RBC AUTO: 91.8 FL (ref 80–94)
MONOCYTES # BLD AUTO: 1.17 X10(3)/MCL (ref 0.1–1.3)
MONOCYTES NFR BLD AUTO: 8.3 %
NEUTROPHILS # BLD AUTO: 10.06 X10(3)/MCL (ref 2.1–9.2)
NEUTROPHILS NFR BLD AUTO: 71.2 %
NRBC BLD AUTO-RTO: 0 %
PLATELET # BLD AUTO: 522 X10(3)/MCL (ref 130–400)
PMV BLD AUTO: 10 FL (ref 7.4–10.4)
POCT GLUCOSE: 215 MG/DL (ref 70–110)
POCT GLUCOSE: 240 MG/DL (ref 70–110)
POCT GLUCOSE: 268 MG/DL (ref 70–110)
POCT GLUCOSE: 330 MG/DL (ref 70–110)
POCT GLUCOSE: 347 MG/DL (ref 70–110)
POTASSIUM SERPL-SCNC: 3.9 MMOL/L (ref 3.5–5.1)
PROT SERPL-MCNC: 6.3 GM/DL (ref 6.4–8.3)
RBC # BLD AUTO: 3.92 X10(6)/MCL (ref 4.2–5.4)
SODIUM SERPL-SCNC: 138 MMOL/L (ref 136–145)
THYROGLOB AB SERPL-ACNC: <1.8 IU/ML
THYROGLOB SERPL-MCNC: 3.7 NG/ML
WBC # SPEC AUTO: 14.11 X10(3)/MCL (ref 4.5–11.5)

## 2024-01-25 PROCEDURE — 99900035 HC TECH TIME PER 15 MIN (STAT)

## 2024-01-25 PROCEDURE — 25000003 PHARM REV CODE 250

## 2024-01-25 PROCEDURE — 85652 RBC SED RATE AUTOMATED: CPT | Performed by: NURSE PRACTITIONER

## 2024-01-25 PROCEDURE — 63600175 PHARM REV CODE 636 W HCPCS: Performed by: STUDENT IN AN ORGANIZED HEALTH CARE EDUCATION/TRAINING PROGRAM

## 2024-01-25 PROCEDURE — 82550 ASSAY OF CK (CPK): CPT | Performed by: NURSE PRACTITIONER

## 2024-01-25 PROCEDURE — 63600175 PHARM REV CODE 636 W HCPCS: Performed by: INTERNAL MEDICINE

## 2024-01-25 PROCEDURE — 85025 COMPLETE CBC W/AUTO DIFF WBC: CPT

## 2024-01-25 PROCEDURE — 94761 N-INVAS EAR/PLS OXIMETRY MLT: CPT

## 2024-01-25 PROCEDURE — 25000003 PHARM REV CODE 250: Performed by: STUDENT IN AN ORGANIZED HEALTH CARE EDUCATION/TRAINING PROGRAM

## 2024-01-25 PROCEDURE — 86140 C-REACTIVE PROTEIN: CPT | Performed by: NURSE PRACTITIONER

## 2024-01-25 PROCEDURE — 63600175 PHARM REV CODE 636 W HCPCS

## 2024-01-25 PROCEDURE — 11000001 HC ACUTE MED/SURG PRIVATE ROOM

## 2024-01-25 PROCEDURE — 80053 COMPREHEN METABOLIC PANEL: CPT

## 2024-01-25 RX ORDER — TACROLIMUS 0.5 MG/1
0.5 CAPSULE ORAL EVERY MORNING
Status: DISCONTINUED | OUTPATIENT
Start: 2024-01-27 | End: 2024-01-26 | Stop reason: HOSPADM

## 2024-01-25 RX ORDER — TACROLIMUS 0.5 MG/1
0.5 CAPSULE ORAL EVERY MORNING
Status: DISCONTINUED | OUTPATIENT
Start: 2024-01-29 | End: 2024-01-26 | Stop reason: HOSPADM

## 2024-01-25 RX ORDER — PREDNISOLONE 15 MG/5ML
7.5 SOLUTION ORAL DAILY
Status: DISCONTINUED | OUTPATIENT
Start: 2024-01-26 | End: 2024-01-26 | Stop reason: HOSPADM

## 2024-01-25 RX ORDER — ALPRAZOLAM 0.25 MG/1
0.25 TABLET ORAL ONCE AS NEEDED
Status: COMPLETED | OUTPATIENT
Start: 2024-01-25 | End: 2024-01-25

## 2024-01-25 RX ORDER — CETIRIZINE HYDROCHLORIDE 10 MG/1
10 TABLET ORAL DAILY
Status: DISCONTINUED | OUTPATIENT
Start: 2024-01-25 | End: 2024-01-26

## 2024-01-25 RX ORDER — HYDROCORTISONE 1 %
CREAM (GRAM) TOPICAL 2 TIMES DAILY
Status: DISCONTINUED | OUTPATIENT
Start: 2024-01-25 | End: 2024-01-26 | Stop reason: HOSPADM

## 2024-01-25 RX ORDER — TACROLIMUS 0.5 MG/1
0.5 CAPSULE ORAL EVERY MORNING
Status: DISCONTINUED | OUTPATIENT
Start: 2024-01-31 | End: 2024-01-26 | Stop reason: HOSPADM

## 2024-01-25 RX ORDER — TACROLIMUS 0.5 MG/1
0.5 CAPSULE ORAL 2 TIMES DAILY
Status: DISCONTINUED | OUTPATIENT
Start: 2024-01-30 | End: 2024-01-26 | Stop reason: HOSPADM

## 2024-01-25 RX ADMIN — MYCOPHENOLATE MOFETIL 500 MG: 250 CAPSULE ORAL at 08:01

## 2024-01-25 RX ADMIN — LEVOCARNITINE ORAL SOLUTION 500 MG: 1 SOLUTION ORAL at 08:01

## 2024-01-25 RX ADMIN — INSULIN ASPART 1 UNITS: 100 INJECTION, SOLUTION INTRAVENOUS; SUBCUTANEOUS at 09:01

## 2024-01-25 RX ADMIN — FLUTICASONE PROPIONATE 100 MCG: 50 SPRAY, METERED NASAL at 08:01

## 2024-01-25 RX ADMIN — INSULIN DETEMIR 25 UNITS: 100 INJECTION, SOLUTION SUBCUTANEOUS at 09:01

## 2024-01-25 RX ADMIN — LEVOTHYROXINE SODIUM 62.5 MCG: 25 TABLET ORAL at 08:01

## 2024-01-25 RX ADMIN — LEVOCARNITINE ORAL SOLUTION 500 MG: 1 SOLUTION ORAL at 09:01

## 2024-01-25 RX ADMIN — METOPROLOL TARTRATE 25 MG: 25 TABLET, FILM COATED ORAL at 08:01

## 2024-01-25 RX ADMIN — ALPRAZOLAM 0.25 MG: 0.25 TABLET ORAL at 01:01

## 2024-01-25 RX ADMIN — FOLIC ACID 1000 MCG: 1 TABLET ORAL at 08:01

## 2024-01-25 RX ADMIN — INSULIN ASPART 4 UNITS: 100 INJECTION, SOLUTION INTRAVENOUS; SUBCUTANEOUS at 06:01

## 2024-01-25 RX ADMIN — ATORVASTATIN CALCIUM 20 MG: 20 TABLET, FILM COATED ORAL at 08:01

## 2024-01-25 RX ADMIN — DAPTOMYCIN 300 MG: 350 INJECTION, POWDER, LYOPHILIZED, FOR SOLUTION INTRAVENOUS at 09:01

## 2024-01-25 RX ADMIN — AMLODIPINE BESYLATE 5 MG: 5 TABLET ORAL at 08:01

## 2024-01-25 RX ADMIN — INSULIN ASPART 3 UNITS: 100 INJECTION, SOLUTION INTRAVENOUS; SUBCUTANEOUS at 11:01

## 2024-01-25 RX ADMIN — PREDNISOLONE 7.5 MG: 15 SOLUTION ORAL at 09:01

## 2024-01-25 RX ADMIN — TACROLIMUS 0.5 MG: 0.5 CAPSULE ORAL at 08:01

## 2024-01-25 RX ADMIN — INSULIN DETEMIR 15 UNITS: 100 INJECTION, SOLUTION SUBCUTANEOUS at 08:01

## 2024-01-25 RX ADMIN — INSULIN ASPART 2 UNITS: 100 INJECTION, SOLUTION INTRAVENOUS; SUBCUTANEOUS at 04:01

## 2024-01-25 RX ADMIN — ASPIRIN 325 MG ORAL TABLET 325 MG: 325 PILL ORAL at 08:01

## 2024-01-25 RX ADMIN — HYDROCORTISONE: 1 CREAM TOPICAL at 08:01

## 2024-01-25 NOTE — PROGRESS NOTES
[facility] Medicine Wards Progress Note     Resident Team: [facility] Medicine List 2  Attending Physician: Ender Borges MD    Subjective:      Brief HPI:  28 y.o. female with PMHx of Renal transplant on immunosuppressants, DM-II, HTN, HLD, Papillary thyroid carcinoma s/p hemithyroidectomy with resulting Hypothyroidism, CVA with left-sided deficits, developmental delay, and dysphagia with PEG tube was sent to the ED with her father for sepsis work-up. Patient was admitted yesterday for tachycardia of 150bpm, improved with IVF hydration, and was discharged home earlier today. Father was called to bring the patient back to the ED for positive blood cultures that were obtained during admission. Father denies any new or worsening symptoms since discharge. He reports 1 episode of diarrhea today after increasing fluid intake due to hard stools. He reports bowl movements typically fluctuate between hard and soft stools. He denies recent fever, cough, congestion, vomiting, blood in stool, rashes, and changes in baseline mentation. Patient is nonverbal at baseline.     In ED, patient is afebrile, normotensive, and tachycardic with pulse in 130s. Labs reveal WBC of 19.1, which appears at patient's baseline, stable H/H at 13.5/41.0, Lactic acid of 1.9, Glucose of 307, and slight hypomagnesemia at 1.5. Pt was given LR 1L bolus in ED. Repeat blood cultures were drawn. Internal medicine consulted for further management.    Interval History:   Doing well with no changes in status. Remains tachycardic, WBC trending down. Indium scan today and tomorrow.  Cardiology being consulted for potential removal of loop recorder in order to obtain MRI.  Repeat blood cultures  are no growth to date.    Objective:     Last 24 Hour Vital Signs:  BP  Min: 118/75  Max: 139/84  Temp  Av °F (36.7 °C)  Min: 96.7 °F (35.9 °C)  Max: 99.1 °F (37.3 °C)  Pulse  Av  Min: 99  Max: 120  Resp  Av.5  Min: 16  Max: 18  SpO2  Av.6 %  Min: 97 %   Max: 100 %  No intake/output data recorded.    Physical Examination:  General: Nonverbal at baseline, not in acute distress  HEENT: NC/AT, conjunctiva normal, moist mucus membranes.  There is cerumen in the right ear.  Question of some redness per staff attending.  CVS: RRR, no LE edema  Abdomen: soft, non-distended; PEG tube in place, insertion site clean and dry with no surrounding erythema.  MSK: No obvious deformity or joint swelling  Skin: Warm and dry, no open wounds or ulcers  Neuro: Awake and alert      Laboratory:  Most Recent Data:  CBC:   Lab Results   Component Value Date    WBC 14.11 (H) 01/25/2024    HGB 11.7 (L) 01/25/2024    HCT 36.0 (L) 01/25/2024     (H) 01/25/2024    MCV 91.8 01/25/2024    RDW 11.9 01/25/2024     WBC Differential:   Recent Labs   Lab 01/21/24  0403 01/22/24  0407 01/23/24  0311 01/24/24  0315 01/25/24  0322   WBC 14.99* 19.05* 13.22* 11.34 14.11*   HGB 11.8* 13.1 11.8* 11.7* 11.7*   HCT 35.5* 40.0 36.4* 36.4* 36.0*   * 483* 514* 464* 522*   MCV 91.0 91.7 92.9 92.9 91.8     BMP:   Lab Results   Component Value Date     01/25/2024    K 3.9 01/25/2024     12/24/2023    CO2 25 01/25/2024    BUN 11.2 01/25/2024    CREATININE 0.95 01/25/2024    CALCIUM 9.3 01/25/2024    MG 1.50 (L) 01/20/2024    PHOS 3.0 01/19/2024     LFTs:   Lab Results   Component Value Date    ALBUMIN 2.8 (L) 01/25/2024    BILITOT 0.2 01/25/2024    AST 14 01/25/2024    ALKPHOS 90 01/25/2024    ALT 27 01/25/2024     Coags:   Lab Results   Component Value Date    INR 0.9 10/22/2021     FLP:   Lab Results   Component Value Date    CHOL 121 12/22/2023    HDL 35 (L) 12/22/2023    LDLCALC 18 12/22/2023    TRIG 341 (H) 12/22/2023     DM:   Lab Results   Component Value Date    HGBA1C 8.8 (H) 01/19/2024    HGBA1C 8.8 (H) 10/06/2022    HGBA1C 7.4 (H) 04/07/2022    LDLCALC 18 12/22/2023    CREATININE 0.95 01/25/2024     Thyroid:   Lab Results   Component Value Date    TSH 1.446 01/24/2024    [id number]  179 01/06/2023        Cardiac:   Lab Results   Component Value Date    TROPONINI <0.01 12/22/2023    BNP 32 07/13/2021         Microbiology Data:  Microbiology Results (last 7 days)       Procedure Component Value Units Date/Time    Blood culture #1 **CANNOT BE ORDERED STAT** [[id number]]  (Normal) Collected: 01/20/24 1935    Order Status: Completed Specimen: Blood Updated: 01/24/24 0900     CULTURE, BLOOD ([facility]) No Growth At 72 Hours    Blood culture #2 **CANNOT BE ORDERED STAT** [[id number]]  (Normal) Collected: 01/20/24 1935    Order Status: Completed Specimen: Blood Updated: 01/24/24 0900     CULTURE, BLOOD ([facility]) No Growth At 72 Hours    Chlamydia/GC, PCR [[id number]]     Order Status: Sent Specimen: Urine              Other Results:    Radiology:  Imaging Results              X-Ray Chest 1 View (Final result)  Result time 01/20/24 20:38:11      Final result by Mt Jha MD (01/20/24 20:38:11)                   Impression:      Cardiomegaly and mild interstitial thickening which may reflect interstitial edema versus vascular crowding secondary to underinflation.      Electronically signed by: Mt Jha MD  Date:    01/20/2024  Time:    20:38               Narrative:    EXAMINATION:  Single view chest radiograph.    CLINICAL HISTORY:  Sepsis, unspecified organism    TECHNIQUE:  Single view of the chest.    COMPARISON:  Chest radiograph 01/19/2024.    FINDINGS:  The lungs are underinflated.  There is mild interstitial thickening.  There is no pneumothorax.  The cardiac silhouette is enlarged.  There is no acute osseous abnormality.                                      Current Medications:     Infusions:         Scheduled:   ALPRAZolam  0.25 mg Per G Tube Once    amLODIPine  5 mg Oral Daily    aspirin  325 mg Per G Tube Daily    atorvastatin  20 mg Oral QHS    DAPTOmycin (CUBICIN) IV (PEDS and ADULTS)  6 mg/kg Intravenous Q24H    fluticasone propionate  2 spray Each Nostril Daily    folic acid  1,000 mcg Per G  Tube Daily    insulin detemir U-100  20 Units Subcutaneous QHS    levocarnitine  500 mg Oral BID    levothyroxine  62.5 mcg Oral Before breakfast    metoprolol tartrate  25 mg Oral BID    mycophenolate  500 mg Oral Every Tues, Thurs    prednisoLONE  15 mg Per G Tube Daily    tacrolimus  0.5 mg Oral Daily AM    [START ON 1/27/2024] tacrolimus  0.5 mg Oral Daily AM    [START ON 1/29/2024] tacrolimus  0.5 mg Oral Daily AM    [START ON 1/30/2024] tacrolimus  0.5 mg Oral Daily PM        PRN:  albuterol, dextrose 10%, dextrose 10%, flumazeniL, glucagon (human recombinant), glucose, glucose, insulin aspart U-100, naloxone, sodium chloride 0.9%    Assessment & Plan:     SIRS (2/4) (tachycardia, leukocytosis)  Gram positive cocci bacteremia  - Blood cultures collected 1/19 with 1 bottle positive Staph epidermidis  - BioFire MecA positive  - Repeat blood cultures 1/20 NG   - Vancomycin initiated 1/20  - Echo with EF 55-60% normal with exception of trace tricuspid regurgitation, no vegetations visualized  - ID following: daptomycin 6 mg/kg IV qd (initiated 1/23), appreciate assistance  - CT T and L spine with extensive demineralization.  We need to obtain MRIs of these regions although patient does have a loop recorder in place which is incompatible with MRI per Radiology.  We have consulted Cardiology for possible removal of her loop recorder in order to obtain the MRI.  - 0.25 mg Xanax p.r.n. to obtain Indium studies     Hx of renal transplant  - Follows Dr. George Bryan, recs are below  - Prednisolone 7.5 mg qd  - Cellcept 500 mg qd on Tuesday and Thursday  - Tacrolimus 0.5 mg BID Tue  - Tacrolimus 0.5 mg qd Mon/Wed/Thu/Sat  - Advised redraw tacrolimus lvl on 1/29/2024      HTN  - Continue home Norvasc 5 mg qd    DM  - Endocrinology following: Levemir 15 units q.a.m., 20 units q.h.s., continue sliding-scale and titration further with blood glucoses.    History of thyroid cancer   Awaiting TG levels, likely further to  follow outpatient.      Earwax/redness?  - patient's mother noted some discharge from patient's right ear and was requesting evaluation.  ENT has been consulted for further evaluation.  In the meanwhile there recommended Ciprodex for significant concerns/irritation.  Patient does not appear to be pulling at the ear per father.    Patient also has osteopenia, vitamin-D is within normal limits via the FRAX score is limited by age but is likely below threshold for treatment.  Outpatient DEXA scan per Endocrinology.      CODE STATUS:  Full  Access:  PIV  Antibiotics:  Daptomycin  Diet: PEG tube feeds per family  DVT ppx: SCDs  GI ppx: none     Disposition: Admitted for bacteremia currently on daptomycin    Celio Botello DO  [facility], -II

## 2024-01-25 NOTE — PROGRESS NOTES
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 Upon ER visit, pt was given IVF and blood cultures were drawn. She was then discharged home. Pt was then called to come back to ER on 1/20/24 d/t bacteremia with coag-negative Staph. Blood cultures were noted to be drawn from 2 different sites on 1/19/24 and grew out 2 different coag-negative Staph (Staph epi and Staph capitus) with positive mecA on BCID .  Upon ER return on 1/20/24, blood cultures were again repeated and at present are NGTD x 24 hours. Father is currently with pt. He tells me that pt has not been exhibiting overt signs of illness. The only occasional abnormal symptoms that pt has been having were occasional sweating, constipation, "gagging", bending over a lot and reaching around back, and "stressing" more often. Since pt is nonverbal, assessment is difficult. Pt has been afebrile through course of hospitalization with leukocytosis. Leukocytosis seems to be chronic in nature. Pt has been tachycardic (HR ranging from ). Upon exam, pt is noted with likely lumbar and thoracic pain with palpation as she moans louder and cries out with palpation with these areas. Pt with no implanted devices / artifical joints / etc. Father tells me that they adopted pt at 3 months old. Biological mother took Depakote and Lithium while pregnant. Pt seems to " be well cared for. Pt had renal transplant with donor kidney in 2007 d/t native kidneys being too small; states pt remains with bilateral native kidneys. Noted immunosuppression medications have currently been held. Pt is currently on Vancomycin without any reported issue. ID has been consulted for coag-negative bacteremia in the setting immunosuppression.       Interval History:  Patient lying in bed. Father at bedside. Patient is sleeping and snoring. Father states she had a good night, except only difference is pt has been sleeping a lot since yesterday. Pt remains afebrile and WBC now 14.1. Still with occasional tachycardia. Pt was unable to do MRI d/t finding of pt with loop recorder. Pt is going for NM scan today as recommended as alternative by Radiology. Immunosuppressant mediations adjusted per primary team.  Pt on Daptomycin and appears to be tolerating without issue. Father would like to do outpatient infusions here at [facility] infusion center (instead of home) when ready for discharge.      Antibiotic History:  Vancomycin IV (trough 15-20) - 1/20/24 to 1/22/24  Daptomycin 6 mg/kg IV daily - 1/22/24 to present      MEDICATIONS:   Reviewed in EMR    REVIEW OF SYSTEMS:   Except as documented, all other systems reviewed and negative     PHYSICAL EXAM:   T 99 °F (37.2 °C)   /76   P 100   RR 20   O2 98 %  GENERAL: Middle age WF with mental delay who is sleeping; does not appear ill overall; nonverbal    HEENT: atraumatic, anicteric, moist oral mucosa   LUNGS: breathing unlabored, lungs with a lot of course upper airway sounds and clear in bases  HEART: RRR; no obvious murmur, no rub or gallop  ABDOMEN: abdomen soft, nondistended, BS noted x 4 quadrants, no obvious rebound, guarding, or organomegaly; PEG button without erythema/drainage  : no obvious CVA tenderness; diapers  EXTREMITIES: no edema, clubbing, or cyanosis; + muscle wasting  MUSCULOSKELETAL: appears has lumbar / thoracic tenderness with  "palpation again today  SKIN: no obvious rashes or lesions  NEURO: nonverbal; LT sided weakness   PSYCH: cooperative  LINES: PIV     LABS AND IMAGING:     Recent Labs     01/24/24 0315 01/25/24 0322   WBC 11.34 14.11*   RBC 3.92* 3.92*   HGB 11.7* 11.7*   HCT 36.4* 36.0*   MCV 92.9 91.8   MCH 29.8 29.8   MCHC 32.1* 32.5*   RDW 12.1 11.9   * 522*     No results for input(s): "LACTIC" in the last 72 hours.    No results for input(s): "INR", "APTT", "D-DIMER" in the last 72 hours.  No results for input(s): "HGBA1C", "CHOL", "TRIG", "LDL", "VLDL", "HDL" in the last 72 hours.   Recent Labs     01/24/24 0315 01/25/24 0322    138   K 3.9 3.9   CHLORIDE 104 103   CO2 25 25   BUN 6.6* 11.2   CREATININE 0.86 0.95   GLUCOSE 279* 307*   CALCIUM 8.9 9.3   ALBUMIN 2.7* 2.8*   GLOBULIN 3.2 3.5   ALKPHOS 94 90   ALT 35 27   AST 20 14   BILITOT 0.2 0.2   CRP  --  27.90*   TSH 1.446  --      Recent Labs     01/25/24 0322   CPK 18*          Estimated Creatinine Clearance: 56.5 mL/min (based on SCr of 0.95 mg/dL).   Lab Results   Component Value Date    SEDRATE 53 (H) 01/25/2024      Lab Results   Component Value Date    CRP 27.90 (H) 01/25/2024        Micro:   Colonization:  1/19/24 Blood cultures x 2 with coag-negative Staph (RT AC with Staph capitis  in 1 of 1 bottle and LT AC with Staph epi in 1 of 2 bottles)  1/20/24 Blood cultures x 2 NGTD      CT Lumbar Spine Without Contrast  EXAMINATION  CT LUMBAR SPINE WITHOUT CONTRAST    CLINICAL HISTORY  Low back pain, infection suspected;    TECHNIQUE  Helical-acquisition CT images were obtained without the intravenous administration of iodine-based contrast media.  Multiplanar reconstructions were accomplished by a CT technologist at a separate workstation and pushed to PACS for physician review.    COMPARISON  None available at the time of initial interpretation.    FINDINGS  Images were reviewed in soft tissue and bone windows.    Exam quality: adequate for " evaluation    Vertebral bodies: Extensive demineralization of the thoracic column is noted, limiting sensitivity for detection of subtle nondisplaced fractures. Within limitations, no acutely displaced fracture is visualized. No compression deformity or focal vertebral body abnormality. No evidence of traumatic subluxation. No focal contour irregularity or otherwise suspicious abnormality identified through the course of the lumbar spinal canal.    Disc spaces: Intervertebral disc heights are preserved.    Curvature: Within normal limits.    Bony pelvis: Sacroiliac joints are symmetric and congruent. The visualized pelvic structures are without acute displacement or suspicious focal abnormality.    Other findings: No prevertebral thickening, expansile collection, or focal contour irregularity. No acute muscular abnormality. No evidence of acute process within the visualized abdomen.  Incidentally noted bilateral renal agenesis versus severe atrophy.    IMPRESSION  1. Widespread appearance of lumbar spine demineralization with no convincing evidence of acute or focal abnormality.  2. Additional secondary details discussed above.    RADIATION DOSE  Automated tube current modulation, weight-based exposure dosing, and/or iterative reconstruction technique utilized to reach lowest reasonably achievable exposure rate.    DLP: 1314 mGy*cm    Electronically signed by: Orlando Patel  Date:    01/23/2024  Time:    17:37  CT Thoracic Spine Without Contrast  EXAMINATION  CT THORACIC SPINE WITHOUT CONTRAST    CLINICAL HISTORY  Mid-back pain;    TECHNIQUE  Helical-acquisition CT images were obtained without the use of intravenous contrast.  Multiplanar reconstructions accomplished by a CT technologist at a separate workstation and pushed to PACS for physician review.    COMPARISON  None available at the time of initial interpretation.    FINDINGS  Images were reviewed and soft tissue and bone windows.    Exam quality: Adequate for  spinal column assessment. (Evaluation of the spinal canal contents and nonosseous tissues is inherently limited by the absence intrathecal/intravascular contrast administration.)    Vertebral segments: Extensive demineralization of the thoracic column is noted, limiting sensitivity for detection of subtle nondisplaced fractures.  Within limitations, no acutely displaced fracture is visualized.  No compression deformity or focal vertebral body abnormality.  No evidence of traumatic subluxation.  No focal contour irregularity or otherwise suspicious abnormality identified through the course of the thoracic spinal canal.    Disc spaces: Intervertebral disc heights are preserved.    Curvature: Within normal limits.    Other findings: No thickening or focal contour irregularity of the prevertebral soft tissues.  Visualized paraspinal musculature is unremarkable.  The partially included thoracic/abdominal cavity is without evidence of acute or suspicious focal CT abnormality.  Regional vasculature is grossly normal for limited assessment.    IMPRESSION  1. No convincing evidence of acute thoracic spine abnormality.  2. Extensive loss of bone mineral density.    RADIATION DOSE  Automated tube current modulation, weight-based exposure dosing, and/or iterative reconstruction technique utilized to reach lowest reasonably achievable exposure rate.    DLP: 951.9 mGy*cm    Electronically signed by: Orlando Patel  Date:    01/23/2024  Time:    17:34  US Thyroid  Narrative: EXAMINATION:  US THYROID    CLINICAL HISTORY:  history of papillary thyroid carcinoma s/p hemithyroidectomy;.  Bacteremia    TECHNIQUE:  Ultrasound of the thyroid and cervical lymph nodes was performed.    COMPARISON:  10/31/2022    FINDINGS:  Technically limited exam due to body habitus.  Suboptimal visualization.    Postop right hemithyroidectomy.  Questionable residual parenchyma at the right thyroid bed measuring 1.9 x 1.5 x 1.3 cm.  Left lobe measures 2 x  1.7 x 1.1 cm.  No appreciable thyroid nodule.  No abscess.  Impression: Suboptimal exam.  Previously seen thyroid nodule not appreciable on today's exam.  Questionable remnant thyroid tissue versus fat or nodule at the right thyroid bed.  Suggest outpatient follow-up thyroid sonogram after resolution of patient's acute illness.    Electronically signed by: Chiquis Enrique  Date:    01/23/2024  Time:    15:48      ASSESSMENT & PLAN:   Coag-negative Staph bacteremia  Immunosuppressed state  History of renal transplant (2007)  Diabetes mellitus  History of Valprotic Acid Syndrome / Mental Delay / Nonverbal  Possible back pain (lumbar / thoracic)  Leukocytosis   Tachycardia  Loop recorder present        Coag-negative Staph bacteremia in the setting of immunosuppressed pt secondary to renal transplant (2007).   Pt is afebrile and occasional tachycardia. Now with resolved leukocytosis  Blood cultures with coag-negative Staph. Repeat blood cultures NGTD   TTE 1/22/24 negative for valvular vegetations  CT L-spine and T-spine with no acute abnormalities, but does show concerns for bone demineralization  Unable to do MRI d/t presence of loop recorder. NM body scan planned for today  On reduced dose of immunosuppressive therapy  Even though these bacteria are usually considered contaminants in an immunocompetent individual, they are hard to ignore as pt is with immunocompromised state. Pt is not overtly ill appearing.  Pt appears to be showing clinical improvement        RECOMMENDATIONS:     Continue Daptomycin 6 mg/kg IV daily for coag-negative Staph bacteremia  Awaiting NM scan  Still awaiting further testing to determine need for Midline vs PICC  Agree with cardiology consult for possible loop recorder removal as MRI is still preferred test  Pt will need to be set up on  [facility] 5th floor infusion center when ready for discharge. Family does not feel comfortable doing IV therapy at home. Case management to assist with this    ID will continue to follow      CHANTELL Souza  [facility]

## 2024-01-25 NOTE — CARE UPDATE
NM inflammatory whole body ordered (Indium-111 scan). Timing of scan pending acquisition of isotope from [facility]. Radiology will work on securing needed supplies tonight. Appreciate everyone's assistance.    Haresh Cash MD  [facility]  PGY-II

## 2024-01-25 NOTE — PROGRESS NOTES
"[facility]  Endocrinology  Progress Note    Admit Date: 1/20/2024     Interval HPI:   Pt is a 27 y/o w/ Pmhx of stacie thyroidectomy for PTC, postsurgical hypothyroidism, renal transplant w/ posttransplant DM admitted for bacteremia.    Endocrine has been following for Endocrinopathies while teams are sorting mgmt of her infection.  Pt has had prednisolone added for her transplant.    As for DM, increased basal to 20u overnight.  Glucoses have remained above 300.  Pt received nutrition via tube feeds in the evening.    As for thyroid, resumed LT4 a few days ago, tg is pending and u/s was done.    Incidentally, CT showed osteopenia.  Mom endorses pt has had this dx in the past.  Has likely been several yrs since last DXA.    /76   Pulse 100   Temp 99 °F (37.2 °C) (Oral)   Resp 20   Ht 4' 1.02" (1.245 m)   Wt 49.9 kg (110 lb)   SpO2 98%   Breastfeeding No   BMI 32.19 kg/m²     Labs Reviewed and Include    Recent Labs   Lab 01/25/24  0322   CALCIUM 9.3   ALBUMIN 2.8*      K 3.9   CO2 25   BUN 11.2   CREATININE 0.95   ALKPHOS 90   ALT 27   AST 14   BILITOT 0.2     Lab Results   Component Value Date    WBC 14.11 (H) 01/25/2024    HGB 11.7 (L) 01/25/2024    HCT 36.0 (L) 01/25/2024    MCV 91.8 01/25/2024     (H) 01/25/2024     Recent Labs   Lab 01/20/24  1935 01/24/24  0315   TSH 0.357 1.446     Lab Results   Component Value Date    HGBA1C 8.8 (H) 01/19/2024       Nutritional status:   Body mass index is 32.19 kg/m².  Lab Results   Component Value Date    ALBUMIN 2.8 (L) 01/25/2024    ALBUMIN 2.7 (L) 01/24/2024    ALBUMIN 2.8 (L) 01/23/2024     No results found for: "PREALBUMIN"    Estimated Creatinine Clearance: 56.5 mL/min (based on SCr of 0.95 mg/dL).    Accu-Checks  Recent Labs     01/23/24  1021 01/23/24  1436 01/23/24  1820 01/23/24  2316 01/24/24  0557 01/24/24  1116 01/24/24  1744 01/24/24  2059 01/25/24  0614 01/25/24  0849   POCTGLUCOSE 185* 249* 211* 217* 331* " 303* 391* 343* 347* 268*       Current Medications and/or Treatments Impacting Glycemic Control  Immunotherapy:    Immunosuppressants           Stop Route Frequency     tacrolimus capsule 0.5 mg         -- Oral Every evening     tacrolimus capsule 0.5 mg         02/02/24 0759 Oral Every morning     tacrolimus capsule 0.5 mg         01/28/24 0759 Oral Every morning     mycophenolate capsule 500 mg         -- Oral Every Tues, Thurs          Steroids:   Hormones (From admission, onward)      Start     Stop Route Frequency Ordered    01/23/24 0900  prednisoLONE 15 mg/5 mL syrup 15 mg         -- PER G TUBE Daily 01/22/24 2051            Hyperglycemia/Diabetes Medications:   Antihyperglycemics (From admission, onward)      Start     Stop Route Frequency Ordered    01/25/24 0900  insulin detemir U-100 injection 15 Units         -- SubQ Daily 01/25/24 0751    01/24/24 2100  insulin detemir U-100 injection 20 Units         -- SubQ Nightly 01/24/24 1343    01/23/24 1552  insulin aspart U-100 injection 0-5 Units         -- SubQ Every 6 hours PRN 01/23/24 1453            ASSESSMENT and PLAN    1:  Transplant and glucocorticoid DM:  Increased basal to 15u in am, 20u in pm, continue correction scale and continue to titrate.    2:  Thyroid cancer:  Await tg, likely further f/u outpt    3:  Post surgical hypo:  Continue LT4    4:  Osteopenia:  Vit d wnl.  FRAX scores limited by age but likely below threshold for tx.  Will need outpt DXA    Shan Moreno MD  Endocrinology  [facility] Telemetry    1/25/24 @ 2:31pm:  Glucoses remain signficantly elevated in the 200 to 300 range.  Will further titrate evening dose of levemir from 20 to 25u.

## 2024-01-25 NOTE — PROGRESS NOTES
Inpatient Nutrition Assessment    Admit Date: 1/20/2024   Total duration of encounter: 5 days   Patient Age: 28 y.o.    Nutrition Recommendation/Prescription     Home TF regimen--cyclic feeding at night-- Pediasure with fiber--4 cans, 1 1/2 cup baby rice cereal, 2 jar baby food/banana--approx --1520 calories, 35 gm protein, 800 ml free water. Water flush throughout day/night.   Noted elevated Gluc level--on steroid/anti-rejection --spoke to dad about possible transition to diabetic formula if Gluc remains elevated (300s)--concern with hx of stool consistency in past/diarrhea. Consider--change to Diabetic Source AC 4 cans per day, decrease baby rice cereal to 1 cup per day, and decrease to 1 jar baby food/banana --blenderized (night feeds)--1540 kcal, 64 gm protein. Continue water flush.  MVI/fe  ? Need probiotic /increased diarrhea with antibiotic tx  Biweekly wt  Will monitor nutrition status     Communication of Recommendations: reviewed with nurse and reviewed with caregiver    Nutrition Assessment     Malnutrition Assessment/Nutrition-Focused Physical Exam    Malnutrition Context: chronic illness (01/22/24 1132)  Malnutrition Level: other (see comments) (pt does not meet malnutrition criteria) (01/22/24 1132)           Orbital Region (Subcutaneous Fat Loss): well nourished  Upper Arm Region (Subcutaneous Fat Loss): well nourished           Clavicle Bone Region (Muscle Loss): well nourished  Clavicle and Acromion Bone Region (Muscle Loss): well nourished           A minimum of two characteristics is recommended for diagnosis of either severe or non-severe malnutrition.    Chart Review    Reason Seen: continuous nutrition monitoring    Malnutrition Screening Tool Results   Have you recently lost weight without trying?: No  Have you been eating poorly because of a decreased appetite?: No   MST Score: 0   Diagnosis:  Sepsis, tachycardia, + blood cultures     Relevant Medical History: post tx DM, thyroid CA, goiter,  "Renal transplant, stroke, mental delay, hypothyroid, diarrhea     Nutrition-Related Medications: ASA, atorvastatin, folic acid, tacrolimus, prednisolone, insulin, lopressor  Calorie Containing IV Medications: no significant kcals from medications at this time    Nutrition-Related Labs:  (1-22) H/H 13.1/40.0 Gluc 202(H) Bun 5.4 Cr 0.7 K 3.6 Alb 2.9(L)   (1-25) H/H 11.7/36.0(L) Gluc 307(H) Bun 11.2 Cr 0.95 K 3.9     Nutrition Orders:   Diet NPO      Appetite/Oral Intake: NPO/NPO; home tube feed regimen     Factors Affecting Nutritional Intake: impaired cognitive status/motor control and tube feeding     Food/Amish/Cultural Preferences: none reported    Food Allergies: none reported    Wound(s):   none    Last Bowel Movement: 01/24/24    Comments  (1/25) Pt laying in bed/dad at bedside; continues to receive home tube feeding regimen/nightfeeds; noted gluc elevated--on steroids; spoke to dad about ? Use diabetic formula ; also recommend increase protein to 1-1.2 gm/kg with inflammatory process; recs provided; dad currently would like to stay with current feeding . Gluc (H)--DM/on steroid. TF recs provided.     (1/22) Pt laying in bed; dad at bedside; pt nonverbal; feedings per PEG--dad reviewed feeding regimen--pt on night feeds--approx 3 pm to 7am--receives 4 cans pediasure with fiber, 1 1/2 c rice cereal and 2 jar banana baby food; blenderized together; pt had difficulty with diarrhea in past --reason for addition of rice cereal/banana baby food; Dad reported MD/dietitian developed formula regimen; would like to stay with current formula regimen. Dad reported pt wt stable between 110-115#. Dad does give water flush--approx 3 syringe --several times throughout day. Gluc (H)--hx DM; ? Related to infection; dad did reported blood sugar usually well controlled at home. Pt appears well developed--no fat/muscle wasting. Pt is wheelchair/scooter bound; unable to walk.     Anthropometrics    Height: 4' 1.02" (124.5 cm)  "   Last Weight: 49.9 kg (110 lb) (24 1213) Weight Method: Bed Scale  BMI (Calculated): 32.2BMI 32  BMI Classification: obese grade I (BMI 30-34.9)     Ideal Body Weight (IBW), Female: 45.1 lb     % Ideal Body Weight, Female (lb): 243.9 %                    Usual Body Weight (UBW), k kg (pt nonverbal; per dad--pt weighs between 110-115#)  % Usual Body Weight: 100.21     Usual Weight Provided By: family/caregiver    Wt Readings from Last 5 Encounters:   24 49.9 kg (110 lb)   24 50 kg (110 lb 3.7 oz)   24 52.2 kg (115 lb)   23 52.2 kg (115 lb)   10/12/21 52.6 kg (115 lb 15.4 oz)     Weight Change(s) Since Admission: no wt loss   Wt Readings from Last 1 Encounters:   24 1213 49.9 kg (110 lb)   24 50 kg (110 lb 3.7 oz)   Admit Weight: 50 kg (110 lb 3.7 oz) (24), Weight Method: Bed Scale    Estimated Needs    Weight Used For Calorie Calculations: 50 kg (110 lb 3.7 oz)  Energy Calorie Requirements (kcal): 1350 to 1500 kcal/d; 27-30 panda/kg  Energy Need Method: Kcal/kg  Weight Used For Protein Calculations: 50 kg (110 lb 3.7 oz)  Protein Requirements: 50-60 gm protein/d; 1-1.2 gm/kg (inflammatory process; hx renal tx)  Fluid Requirements (mL): 1350 ml/d; 1ml/panda  Temp (24hrs), Av.9 °F (36.6 °C), Min:96.7 °F (35.9 °C), Max:99 °F (37.2 °C)       Enteral Nutrition    Formula:  pediasure with fiber  Rate/Volume: 4 can   Water Flushes: 3 syringe every few hr  Additives/Modulars:  1 1/2 c rice cereal, 2 jar baby food/banana  Route: PEG tube  Method: cyclic  Total Nutrition Provided by Tube Feeding, Additives, and Flushes:  Calories Provided  1520 kcal/d, 104% needs   Protein Provided  35 g/d, 70% needs   Fluid Provided  1520 ml/d, 100% needs       Parenteral Nutrition    Patient not receiving parenteral nutrition support at this time.    Evaluation of Received Nutrient Intake    Calories: meeting estimated needs  Protein: not meeting estimated needs    Patient  Education    Not applicable.    Nutrition Diagnosis     PES: Altered GI function related to chronic illness as evidenced by peg/diarrhea . (active)    Interventions/Goals     Intervention(s): modified composition of enteral nutrition, modified rate of enteral nutrition, and collaboration with other providers    Goal: Meet greater than 80% of nutritional needs by follow-up. (goal progressing)    Monitoring & Evaluation     Dietitian will monitor enteral nutrition intake and electrolyte/renal panel.    Nutrition Risk/Follow-Up: moderate (follow-up in 3-5 days)   Please consult if re-assessment needed sooner.

## 2024-01-25 NOTE — CONSULTS
[facility]  Otolaryngology-Head & Neck Surgery    Consult note    Chiquis Velásquez  [id number]  1995    CC:  Concern for right ear drainage    HPI: Chiquis Velásquez is a 28 y.o. female history of valproic acid syndrome, mental delay, nonverbal status, renal transplant with donor kidney in 2007 on immunosuppressants as well as a history of papillary thyroid cancer for which she had a hemithyroidectomy who is currently admitted to the medicine service with staph bacteremia currently on vancomycin and daptomycin.  Yesterday mom noticed some possible drainage from the right ear and requested examination.  Medicine service attempted to look at right ear but was only saw cerumen and ENT was consulted.  Male kit caregiver in the room on history and examination.  He does not feel that she has any history of any ear surgeries she has not had issues with ear drainage in the past.  She does not wear any devices in either ear.  He does not feel like she has been pulling or tugging at her ears she just tugs at her tube feeding.  Patient is nonverbal and unable to obtain further history from patient.    Review of patient's allergies indicates:   Allergen Reactions    Azithromycin      interaction with anti-rejection rx    Clarithromycin      interaction with anti rejection rx    Loperamide Other (See Comments)     Drug drug interaction    Milk      diarrhea    Amoxicillin-pot clavulanate Diarrhea     diarrhea       Past Medical History:   Diagnosis Date    Cancer     Diabetes mellitus, type 2     Hypothyroidism     Kidney transplanted     Stroke        No past surgical history on file.    Social History     Socioeconomic History    Marital status: Single   Tobacco Use    Smoking status: Never    Smokeless tobacco: Never   Substance and Sexual Activity    Alcohol use: Never    Drug use: Never    Sexual activity: Never     Social Determinants of Health     Financial Resource Strain: Low Risk   (1/22/2024)    Overall Financial Resource Strain (CARDIA)     Difficulty of Paying Living Expenses: Not hard at all   Food Insecurity: No Food Insecurity (1/22/2024)    Hunger Vital Sign     Worried About Running Out of Food in the Last Year: Never true     Ran Out of Food in the Last Year: Never true   Transportation Needs: Unknown (1/22/2024)    PRAPARE - Transportation     Lack of Transportation (Medical): No   Physical Activity: Inactive (1/22/2024)    Exercise Vital Sign     Days of Exercise per Week: 0 days     Minutes of Exercise per Session: 0 min   Stress: Patient Unable To Answer (1/22/2024)    [employer] - Occupational Stress Questionnaire     Feeling of Stress : Patient unable to answer   Social Connections: Unknown (1/22/2024)    Social Connection and Isolation Panel [NHANES]     Frequency of Communication with Friends and Family: Never     Frequency of Social Gatherings with Friends and Family: More than three times a week     Marital Status: Never    Housing Stability: Low Risk  (1/22/2024)    Housing Stability Vital Sign     Unable to Pay for Housing in the Last Year: No     Number of Places Lived in the Last Year: 1     Unstable Housing in the Last Year: No       Family History   Adopted: Yes       [unfilled]    PHYSICAL EXAM:  Vitals:    01/25/24 1108   BP: 97/61   Pulse: 104   Resp:    Temp: 98.3 °F (36.8 °C)       General Appearance:  Well-nourished, nonverbal  Head/Face: atraumatic  Eyes: EOMI, normal conjunctiva  Ears:   Otomicroscopy:   AD: external ear with grade 1 microtia with significantly excoriated skin within the conchal bowl, ear canal without edema or purulent drainage but does have some soft cerumen laterally, this distort some of the view of the TM but the TM appears intact there is no obvious middle ear TM intact fluid collection  AS: external ear with grade 1 microtia, skin normal, ear canal normal was also some soft cerumen laterally, partial  view of TM but intact no obvious middle ear fluid  Nose: External nose normal, septum midline,   Oral Cavity & Oropharynx:  Limited due  Neck: Soft, non-tender, no palpable lymph nodes.  Incision well healed no palpable nodules  Neuro:  Unable to assess due to biliary to follow commands but awake & alert        ASSESSMENT:  Chiquis Velásquez is a 28 y.o. female admitted for bacteremia from staff with concern from drainage from right ear.  On examination there is no evidence of otitis externa or otitis media however she does appear to have some eczematous changes to her right jo ann bowl.  Male caregiver does not feel like this is from digital trauma.  She does also have some soft cerumen near the opening to her ear canal bilaterally that could be the drainage that was seen mother.  Recommend topical steroid cream to right chondral bowl twice a day for about 2 weeks or until improved.  Would also recommend starting on Debrox drops 4 drops every other day for about 3 weeks to help with her cerumen if she would be able to tolerate.  Can not see back in the ENT clinic when she is improved from a bacteremia to reassess her right ear in her cerumen.    PLAN:  --topical steroid ointment to right jo ann bowl twice a day for 2 weeks or until improved  --Debrox drops:  4 drops to both ear every other day for 3 weeks  --outpatient ENT visit in about 4-6 weeks  --Please reach out to ENT for any other further questions or concerns    Monique Vega MD  [facility] Department of Otolaryngology  [facility]

## 2024-01-25 NOTE — PLAN OF CARE
Problem: Skin Injury Risk Increased  Goal: Skin Health and Integrity  Outcome: Ongoing, Progressing     Problem: Adult Inpatient Plan of Care  Goal: Absence of Hospital-Acquired Illness or Injury  Outcome: Ongoing, Progressing  Goal: Optimal Comfort and Wellbeing  Outcome: Ongoing, Progressing  Goal: Readiness for Transition of Care  Outcome: Ongoing, Progressing     Problem: Infection  Goal: Absence of Infection Signs and Symptoms  Outcome: Ongoing, Progressing     Problem: Diabetes Comorbidity  Goal: Blood Glucose Level Within Targeted Range  Outcome: Ongoing, Progressing

## 2024-01-25 NOTE — CONSULTS
"  Cardiology   Ms. Chiquis Velásquez is a 28 y.o. female with:   Chief Complaint   Patient presents with    Abnormal Lab     Pt discharged from hospital today and was called at home to return for possible "blood infection".  Pt non-verbal.  Father with pt.          HPI  Cardiology was consulted for Possible loop recorder removal  Ms. Chiquis Velásquez has history of renal transplant, diabetes, hypertension, hyperlipidemia, thyroidectomy for thyroid cancer, CVA with left-sided deficits, developmental delay, peg tube, and loop recorder implant in 2007.  The patient was admitted for tachycardia and possible sepsis.    ROS:  Please see HPI    PMH:    Patient Active Problem List   Diagnosis    Post-transplant diabetes mellitus    Thyroid cancer    Hypothyroidism, postsurgical    Multinodular goiter    Leukocytosis    Abnormal liver function tests    Bacteremia    Immunocompromised patient    History of renal transplant    Back pain    Tachycardia    Osteopenia     Surgical Hx:  No past surgical history on file.    Social History     Socioeconomic History    Marital status: Single   Tobacco Use    Smoking status: Never    Smokeless tobacco: Never   Substance and Sexual Activity    Alcohol use: Never    Drug use: Never    Sexual activity: Never     Social Determinants of Health     Financial Resource Strain: Low Risk  (1/22/2024)    Overall Financial Resource Strain (CARDIA)     Difficulty of Paying Living Expenses: Not hard at all   Food Insecurity: No Food Insecurity (1/22/2024)    Hunger Vital Sign     Worried About Running Out of Food in the Last Year: Never true     Ran Out of Food in the Last Year: Never true   Transportation Needs: Unknown (1/22/2024)    PRAPARE - Transportation     Lack of Transportation (Medical): No   Physical Activity: Inactive (1/22/2024)    Exercise Vital Sign     Days of Exercise per Week: 0 days     Minutes of Exercise per Session: 0 min   Stress: Patient Unable To Answer (1/22/2024) "    [facility] - Occupational Stress Questionnaire     Feeling of Stress : Patient unable to answer   Social Connections: Unknown (1/22/2024)    Social Connection and Isolation Panel [NHANES]     Frequency of Communication with Friends and Family: Never     Frequency of Social Gatherings with Friends and Family: More than three times a week     Marital Status: Never    Housing Stability: Low Risk  (1/22/2024)    Housing Stability Vital Sign     Unable to Pay for Housing in the Last Year: No     Number of Places Lived in the Last Year: 1     Unstable Housing in the Last Year: No       Family History   Adopted: Yes       Review of patient's allergies indicates:   Allergen Reactions    Azithromycin      interaction with anti-rejection rx    Clarithromycin      interaction with anti rejection rx    Loperamide Other (See Comments)     Drug drug interaction    Milk      diarrhea    Amoxicillin-pot clavulanate Diarrhea     diarrhea       Current Medications:    Current Facility-Administered Medications:     albuterol inhaler 2 puff, 2 puff, Inhalation, Q6H PRN, Gretchen Vance DO    ALPRAZolam tablet 0.25 mg, 0.25 mg, Per G Tube, Once, Haresh Cash MD    amLODIPine tablet 5 mg, 5 mg, Oral, Daily, Gretchen Vance DO, 5 mg at 01/25/24 0843    aspirin tablet 325 mg, 325 mg, Per G Tube, Daily, Gretchen Vance DO, 325 mg at 01/25/24 0843    atorvastatin tablet 20 mg, 20 mg, Oral, QHS, Gretchen Vance DO, 20 mg at 01/24/24 2056    [START ON 1/26/2024] carbamide peroxide 6.5 % otic solution 4 drop, 4 drop, Both Ears, Every other day, Celio Botello DO    cetirizine tablet 10 mg, 10 mg, Per G Tube, Daily, Celio Botello DO    DAPTOmycin (CUBICIN) 300 mg in sodium chloride 0.9% SolP 50 mL IVPB, 6 mg/kg, Intravenous, Q24H, Haresh Cash MD, Stopped at 01/24/24 2136    dextrose 10% bolus 125 mL 125 mL, 12.5 g, Intravenous, PRN, Gretchen Vance DO    dextrose 10%  bolus 250 mL 250 mL, 25 g, Intravenous, PRN, Gretchen Vance DO    flumazeniL injection 0.2 mg, 0.2 mg, Intravenous, Once PRN, Haresh Cash MD    fluticasone propionate 50 mcg/actuation nasal spray 100 mcg, 2 spray, Each Nostril, Daily, Haresh Cash MD, 100 mcg at 01/25/24 0844    folic acid tablet 1,000 mcg, 1,000 mcg, Per G Tube, Daily, Gretchen Vance DO, 1,000 mcg at 01/25/24 0843    glucagon (human recombinant) injection 1 mg, 1 mg, Intramuscular, PRN, Gretchen Vance DO    glucose chewable tablet 16 g, 16 g, Oral, PRN, Gretchen Vance DO    glucose chewable tablet 24 g, 24 g, Oral, PRN, Gretchen Vance DO    hydrocortisone 1 % cream, , Topical (Top), BID, Celio Botello DO    insulin aspart U-100 injection 0-5 Units, 0-5 Units, Subcutaneous, Q6H PRN, Fidel Serrano DO, 2 Units at 01/25/24 1644    insulin detemir U-100 injection 15 Units, 15 Units, Subcutaneous, Daily, Shan Moreno MD, 15 Units at 01/25/24 0851    insulin detemir U-100 injection 25 Units, 25 Units, Subcutaneous, QHS, Shan Moreno MD    levocarnitine 100 mg/mL solution 500 mg, 500 mg, Oral, BID, Gretchen Vance DO, 500 mg at 01/25/24 0844    levothyroxine split tablet 62.5 mcg, 62.5 mcg, Oral, Before breakfast, Fidel Serrano DO, 62.5 mcg at 01/25/24 0843    metoprolol tartrate (LOPRESSOR) tablet 25 mg, 25 mg, Oral, BID, Gretchen Vance DO, 25 mg at 01/25/24 0843    multivitamin liquid per dose 5 mL, 5 mL, Per G Tube, Daily, Celio Botello DO    mycophenolate capsule 500 mg, 500 mg, Oral, Every Tues, Thurs, Haresh Cash MD, 500 mg at 01/25/24 0844    naloxone 0.4 mg/mL injection 0.02 mg, 0.02 mg, Intravenous, PRN, Gretchen Vance DO    [START ON 1/26/2024] prednisoLONE 15 mg/5 mL syrup 7.5 mg, 7.5 mg, Per G Tube, Daily, Celio Botello DO    sodium chloride 0.9% flush 10 mL, 10 mL, Intravenous, Q12H PRN, Gretchen Vance DO    [START ON 1/30/2024] tacrolimus capsule 0.5  "mg, 0.5 mg, Per G Tube, BID, Celio Botello DO    [START ON 1/27/2024] tacrolimus capsule 0.5 mg, 0.5 mg, Oral, Daily AM, Celio Botello DO    [START ON 1/29/2024] tacrolimus capsule 0.5 mg, 0.5 mg, Per G Tube, Daily AM, Celio Botello DO    [START ON 1/31/2024] tacrolimus capsule 0.5 mg, 0.5 mg, Oral, Daily AM, Celio Botello DO  Current Outpatient Medications   Medication Instructions    albuterol (PROVENTIL/VENTOLIN HFA) 90 mcg/actuation inhaler 2 puffs, Inhalation, Every 6 hours PRN    amLODIPine (NORVASC) 5 mg, Oral    aspirin 325 mg, Per G Tube    atorvastatin (LIPITOR) 20 mg, Oral, Nightly    azelastine (ASTELIN) 137 mcg (0.1 %) nasal spray USE ONE SPRAY IN EACH NOSTRIL IN THE MORNING AND BEFORE BEDTIME    BASAGLAR KWIKPEN U-100 INSULIN 10 Units, Subcutaneous, Nightly    cetirizine (ZYRTEC) 10 mg, Oral    FLOVENT HFA 44 mcg/actuation inhaler Inhalation    folic acid (FOLVITE) 1,000 mcg, Oral    glipiZIDE (GLUCOTROL) 10 mg, Oral, 2 times daily with meals    ipratropium (ATROVENT) 42 mcg (0.06 %) nasal spray 2 sprays, Each Nostril    ketoconazole (NIZORAL) 2 % shampoo Topical, Three times weekly    lansoprazole (PREVACID SOLUTAB) 30 MG disintegrating tablet Oral    levocarnitine (CARNITOR) 100 mg/mL Soln 500 mg, Oral, 2 times daily    levothyroxine (SYNTHROID) 125 MCG tablet Take half of a 125mcg (62.5mcg) tablet daily.    LEVSIN/SL 0.125 mg, Oral, 2 times daily PRN    metoprolol tartrate (LOPRESSOR) 25 MG tablet TAKE ONE TABLET BY MOUTH IN THE MORNING AND ONE TABLET BEFORE BEDTIME    multivitamin Liqd 5 mLs, FEEDING TUBE    mupirocin (BACTROBAN) 2 % ointment APPLY TOPICALLY THREE TIMES DAILY AS NEEDED FOR REDNESS AT PEG SITE    mycophenolate mofetil (CELLCEPT) 200 mg/mL SusR TAKE 2.5 MLS VIA PEG TWICE DAILY    ondansetron (ZOFRAN) 8 mg, Per G Tube    pen needle, diabetic 31 gauge x 3/16" Ndle Use daily    prednisoLONE (ORAPRED) 7.5 mg, Oral    tacrolimus (PROGRAF) 0.5 MG Cap take 2 tabs q am every MON, TUES, " "WED  TAKE 1 TAB Q PM ON MON, TUE, WED  TAKE 1 TAB BID Q TH, FR, SA & SUN    TRADJENTA 5 mg, Oral, Daily    triamcinolone acetonide 0.025% (KENALOG) 0.025 % cream Topical, 2 times daily PRN    TRUEPLUS PEN NEEDLE 31 gauge x 3/16" Ndle USE DAILY AS DIRECTED    white petrolatum (AQUAPHOR HEALING) 41 % Oint Topical       ROS:  Please see HPI.    BP Readings from Last 3 Encounters:   01/25/24 127/76   01/20/24 (!) 145/73   01/19/24 132/84        Pulse Readings from Last 3 Encounters:   01/25/24 103   01/20/24 103   01/19/24 (!) 144        Temp Readings from Last 3 Encounters:   01/25/24 (!) 95.9 °F (35.5 °C) (Axillary)   01/20/24 97.7 °F (36.5 °C) (Oral)     Wt Readings from Last 3 Encounters:   01/22/24 49.9 kg (110 lb)   01/19/24 50 kg (110 lb 3.7 oz)   01/19/24 52.2 kg (115 lb)     BMI:  32.19 kg/m^2    PE  Blood pressure 127/76, pulse 103, temperature (!) 95.9 °F (35.5 °C), temperature source Axillary, resp. rate 20, height 4' 1.02" (1.245 m), weight 49.9 kg (110 lb), SpO2 98 %, not currently breastfeeding.  CONSTITUTIONAL:  Sleeping.  Not ill appearing.  CARDIOVASCULAR:  Tachycardia.    PULMONARY:  No respiratory distress.  No audible wheezing.    ABDOMINAL:  No distention.    MUSCULOSKELETAL:  Developmental delay   SKIN:  No bruising.  No rash.  NEURO  sleeping     CARDIAC TESTS  ECHO  Results for orders placed during the hospital encounter of 01/20/24    Echo    Interpretation Summary    Left Ventricle: The left ventricle is normal in size. Ventricular mass is normal. Normal wall thickness. Normal wall motion. There is normal systolic function with a visually estimated ejection fraction of 55 - 60%. There is normal diastolic function.    Right Ventricle: Normal right ventricular cavity size. Systolic function is normal.    Left Atrium: Normal left atrial size.    Right Atrium: Normal right atrial size.    Aortic Valve: The aortic valve is probably tricuspid. There is no stenosis. There is mild aortic " "regurgitation.    Mitral Valve: The mitral valve is structurally normal. There is no stenosis. There is no significant regurgitation.    Tricuspid Valve: There is trace regurgitation.    Aorta: Aortic root is normal in size.    Pulmonary Artery: No pulmonary hypertension. The estimated pulmonary artery systolic pressure is 31 mmHg.    IVC/SVC: Normal venous pressure at 3 mmHg.    Pericardium: Epicardial fat is visualized. There is no pericardial effusion.    STRESS TEST  No results found for this or any previous visit.    LHC  No results found for this or any previous visit.      LABS  Last BMP  Lab Results   Component Value Date     01/25/2024    K 3.9 01/25/2024     12/24/2023    CO2 25 01/25/2024    BUN 11.2 01/25/2024    CREATININE 0.95 01/25/2024    CALCIUM 9.3 01/25/2024    ANIONGAP 10 12/24/2023    EGFRNORACEVR >60 01/25/2024       Lab Results   Component Value Date    CREATININE 0.95 01/25/2024    CREATININE 0.86 01/24/2024    CREATININE 0.74 01/23/2024     Lab Results   Component Value Date    BNP 32 07/13/2021     Lab Results   Component Value Date    TROPONINI <0.01 12/22/2023    TROPONINI <0.01 03/10/2021    TROPONINI <0.01 10/22/2020     Last CBC     Lab Results   Component Value Date    WBC 14.11 (H) 01/25/2024    HGB 11.7 (L) 01/25/2024    HCT 36.0 (L) 01/25/2024    MCV 91.8 01/25/2024     (H) 01/25/2024           Last lipids    Lab Results   Component Value Date    CHOL 121 12/22/2023    CHOL 92 01/06/2023    CHOL 104 10/12/2021    HDL 35 (L) 12/22/2023    HDL 25 (L) 01/06/2023    HDL 26 (L) 10/12/2021    LDL 17.00 (L) 01/06/2023    LDL 33.00 (L) 10/12/2021    LDL 46 03/20/2019    TRIG 341 (H) 12/22/2023    TRIG 249 (H) 01/06/2023    TRIG 227 (H) 10/12/2021    TOTALCHOLEST 4 01/06/2023    TOTALCHOLEST 4 10/12/2021    TOTALCHOLEST 3.3 03/20/2019       LFT   No components found for: "LFT"    ASSESSMENT  Prior loop recorder implant  Possible loop recorder removal so patient can obtain " a MRI    PLAN  May need loop recorder explant  Stop Peg tube feedings at midnight  Consent obtained from patient's father

## 2024-01-26 VITALS
RESPIRATION RATE: 18 BRPM | HEIGHT: 55 IN | SYSTOLIC BLOOD PRESSURE: 125 MMHG | BODY MASS INDEX: 25.46 KG/M2 | WEIGHT: 110 LBS | TEMPERATURE: 98 F | HEART RATE: 111 BPM | OXYGEN SATURATION: 96 % | DIASTOLIC BLOOD PRESSURE: 73 MMHG

## 2024-01-26 LAB
ALBUMIN SERPL-MCNC: 2.9 G/DL (ref 3.5–5)
ALBUMIN/GLOB SERPL: 0.8 RATIO (ref 1.1–2)
ALP SERPL-CCNC: 73 UNIT/L (ref 40–150)
ALT SERPL-CCNC: 28 UNIT/L (ref 0–55)
AST SERPL-CCNC: 18 UNIT/L (ref 5–34)
BACTERIA BLD CULT: NORMAL
BACTERIA BLD CULT: NORMAL
BASOPHILS # BLD AUTO: 0.06 X10(3)/MCL
BASOPHILS NFR BLD AUTO: 0.4 %
BILIRUB SERPL-MCNC: 0.2 MG/DL
BUN SERPL-MCNC: 13 MG/DL (ref 7–18.7)
CALCIUM SERPL-MCNC: 9 MG/DL (ref 8.4–10.2)
CHLORIDE SERPL-SCNC: 103 MMOL/L (ref 98–107)
CO2 SERPL-SCNC: 24 MMOL/L (ref 22–29)
CREAT SERPL-MCNC: 0.84 MG/DL (ref 0.55–1.02)
EOSINOPHIL # BLD AUTO: 0.46 X10(3)/MCL (ref 0–0.9)
EOSINOPHIL NFR BLD AUTO: 3.3 %
ERYTHROCYTE [DISTWIDTH] IN BLOOD BY AUTOMATED COUNT: 12 % (ref 11.5–17)
GFR SERPLBLD CREATININE-BSD FMLA CKD-EPI: >60 MLS/MIN/1.73/M2
GLOBULIN SER-MCNC: 3.5 GM/DL (ref 2.4–3.5)
GLUCOSE SERPL-MCNC: 86 MG/DL (ref 74–100)
HCT VFR BLD AUTO: 37.1 % (ref 37–47)
HGB BLD-MCNC: 12 G/DL (ref 12–16)
IMM GRANULOCYTES # BLD AUTO: 0.1 X10(3)/MCL (ref 0–0.04)
IMM GRANULOCYTES NFR BLD AUTO: 0.7 %
LYMPHOCYTES # BLD AUTO: 3.72 X10(3)/MCL (ref 0.6–4.6)
LYMPHOCYTES NFR BLD AUTO: 26.9 %
MCH RBC QN AUTO: 29.6 PG (ref 27–31)
MCHC RBC AUTO-ENTMCNC: 32.3 G/DL (ref 33–36)
MCV RBC AUTO: 91.4 FL (ref 80–94)
MONOCYTES # BLD AUTO: 1.07 X10(3)/MCL (ref 0.1–1.3)
MONOCYTES NFR BLD AUTO: 7.7 %
NEUTROPHILS # BLD AUTO: 8.44 X10(3)/MCL (ref 2.1–9.2)
NEUTROPHILS NFR BLD AUTO: 61 %
NRBC BLD AUTO-RTO: 0 %
PLATELET # BLD AUTO: 562 X10(3)/MCL (ref 130–400)
PMV BLD AUTO: 9.7 FL (ref 7.4–10.4)
POCT GLUCOSE: 139 MG/DL (ref 70–110)
POCT GLUCOSE: 186 MG/DL (ref 70–110)
POCT GLUCOSE: 253 MG/DL (ref 70–110)
POCT GLUCOSE: 87 MG/DL (ref 70–110)
POCT GLUCOSE: 91 MG/DL (ref 70–110)
POTASSIUM SERPL-SCNC: 3.3 MMOL/L (ref 3.5–5.1)
PROT SERPL-MCNC: 6.4 GM/DL (ref 6.4–8.3)
RBC # BLD AUTO: 4.06 X10(6)/MCL (ref 4.2–5.4)
SODIUM SERPL-SCNC: 141 MMOL/L (ref 136–145)
WBC # SPEC AUTO: 13.85 X10(3)/MCL (ref 4.5–11.5)

## 2024-01-26 PROCEDURE — 25000003 PHARM REV CODE 250

## 2024-01-26 PROCEDURE — 80053 COMPREHEN METABOLIC PANEL: CPT

## 2024-01-26 PROCEDURE — 94761 N-INVAS EAR/PLS OXIMETRY MLT: CPT

## 2024-01-26 PROCEDURE — 63600175 PHARM REV CODE 636 W HCPCS

## 2024-01-26 PROCEDURE — 25000003 PHARM REV CODE 250: Performed by: STUDENT IN AN ORGANIZED HEALTH CARE EDUCATION/TRAINING PROGRAM

## 2024-01-26 PROCEDURE — 63600175 PHARM REV CODE 636 W HCPCS: Performed by: INTERNAL MEDICINE

## 2024-01-26 PROCEDURE — 63600175 PHARM REV CODE 636 W HCPCS: Performed by: STUDENT IN AN ORGANIZED HEALTH CARE EDUCATION/TRAINING PROGRAM

## 2024-01-26 PROCEDURE — 36410 VNPNXR 3YR/> PHY/QHP DX/THER: CPT

## 2024-01-26 PROCEDURE — 99900035 HC TECH TIME PER 15 MIN (STAT)

## 2024-01-26 PROCEDURE — C1751 CATH, INF, PER/CENT/MIDLINE: HCPCS

## 2024-01-26 PROCEDURE — 85025 COMPLETE CBC W/AUTO DIFF WBC: CPT

## 2024-01-26 RX ORDER — SODIUM CHLORIDE 0.9 % (FLUSH) 0.9 %
10 SYRINGE (ML) INJECTION
Status: DISCONTINUED | OUTPATIENT
Start: 2024-01-26 | End: 2024-01-26 | Stop reason: HOSPADM

## 2024-01-26 RX ORDER — ALPRAZOLAM 0.5 MG/1
0.5 TABLET ORAL ONCE
Status: COMPLETED | OUTPATIENT
Start: 2024-01-26 | End: 2024-01-26

## 2024-01-26 RX ORDER — SODIUM CHLORIDE 0.9 % (FLUSH) 0.9 %
10 SYRINGE (ML) INJECTION
Status: CANCELLED | OUTPATIENT
Start: 2024-01-27

## 2024-01-26 RX ORDER — HEPARIN 100 UNIT/ML
500 SYRINGE INTRAVENOUS
Status: CANCELLED | OUTPATIENT
Start: 2024-01-27

## 2024-01-26 RX ORDER — CETIRIZINE HYDROCHLORIDE 1 MG/ML
10 SOLUTION ORAL DAILY
Status: DISCONTINUED | OUTPATIENT
Start: 2024-01-26 | End: 2024-01-26 | Stop reason: HOSPADM

## 2024-01-26 RX ORDER — SODIUM CHLORIDE 0.9 % (FLUSH) 0.9 %
10 SYRINGE (ML) INJECTION EVERY 6 HOURS
Status: DISCONTINUED | OUTPATIENT
Start: 2024-01-27 | End: 2024-01-26 | Stop reason: HOSPADM

## 2024-01-26 RX ADMIN — HYDROCORTISONE: 1 CREAM TOPICAL at 08:01

## 2024-01-26 RX ADMIN — LEVOTHYROXINE SODIUM 62.5 MCG: 25 TABLET ORAL at 09:01

## 2024-01-26 RX ADMIN — CARBAMIDE PEROXIDE 4 DROP: 65 SOLUTION/ DROPS TOPICAL at 08:01

## 2024-01-26 RX ADMIN — INSULIN ASPART 3 UNITS: 100 INJECTION, SOLUTION INTRAVENOUS; SUBCUTANEOUS at 04:01

## 2024-01-26 RX ADMIN — ALPRAZOLAM 0.5 MG: 0.5 TABLET ORAL at 10:01

## 2024-01-26 RX ADMIN — MULTIVIT AND MINERALS-FERROUS GLUCONATE 9 MG IRON/15 ML ORAL LIQUID 5 ML: at 09:01

## 2024-01-26 RX ADMIN — LEVOCARNITINE ORAL SOLUTION 500 MG: 1 SOLUTION ORAL at 09:01

## 2024-01-26 RX ADMIN — PREDNISOLONE 7.5 MG: 15 SOLUTION ORAL at 08:01

## 2024-01-26 RX ADMIN — DAPTOMYCIN 300 MG: 350 INJECTION, POWDER, LYOPHILIZED, FOR SOLUTION INTRAVENOUS at 02:01

## 2024-01-26 RX ADMIN — ASPIRIN 325 MG ORAL TABLET 325 MG: 325 PILL ORAL at 08:01

## 2024-01-26 RX ADMIN — INSULIN DETEMIR 25 UNITS: 100 INJECTION, SOLUTION SUBCUTANEOUS at 09:01

## 2024-01-26 RX ADMIN — CETIRIZINE HYDROCHLORIDE 10 MG: 1 SOLUTION ORAL at 09:01

## 2024-01-26 RX ADMIN — FOLIC ACID 1000 MCG: 1 TABLET ORAL at 08:01

## 2024-01-26 RX ADMIN — AMLODIPINE BESYLATE 5 MG: 5 TABLET ORAL at 08:01

## 2024-01-26 RX ADMIN — METOPROLOL TARTRATE 25 MG: 25 TABLET, FILM COATED ORAL at 08:01

## 2024-01-26 RX ADMIN — FLUTICASONE PROPIONATE 100 MCG: 50 SPRAY, METERED NASAL at 08:01

## 2024-01-26 NOTE — PROGRESS NOTES
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 Upon ER visit, pt was given IVF and blood cultures were drawn. She was then discharged home. Pt was then called to come back to ER on 1/20/24 d/t bacteremia with coag-negative Staph. Blood cultures were noted to be drawn from 2 different sites on 1/19/24 and grew out 2 different coag-negative Staph (Staph epi and Staph capitus) with positive mecA on BCID .  Upon ER return on 1/20/24, blood cultures were again repeated and at present are NGTD x 24 hours. Father is currently with pt. He tells me that pt has not been exhibiting overt signs of illness. The only occasional abnormal symptoms that pt has been having were occasional sweating, constipation, "gagging", bending over a lot and reaching around back, and "stressing" more often. Since pt is nonverbal, assessment is difficult. Pt has been afebrile through course of hospitalization with leukocytosis. Leukocytosis seems to be chronic in nature. Pt has been tachycardic (HR ranging from ). Upon exam, pt is noted with likely lumbar and thoracic pain with palpation as she moans louder and cries out with palpation with these areas. Pt with no implanted devices / artifical joints / etc. Father tells me that they adopted pt at 3 months old. Biological mother took Depakote and Lithium while pregnant. Pt seems to " be well cared for. Pt had renal transplant with donor kidney in 2007 d/t native kidneys being too small; states pt remains with bilateral native kidneys. Noted immunosuppression medications have currently been held. Pt is currently on Vancomycin without any reported issue. ID has been consulted for coag-negative bacteremia in the setting immunosuppression.       Interval History:  Patient lying in bed. Father at bedside. Patient was again sleeping and snoring. Father states she again had a good night. No real new complaints. Pt remains afebrile and WBC now 13.8.. Still with occasional tachycardia. Going for tag WBC scan today. Pt seen by cardiology and they are considering removal of loop recorder. Pt on Daptomycin and appears to be tolerating without issue. Father would like to do outpatient infusions here at [facility] infusion center (instead of home) when ready for discharge.      Antibiotic History:  Vancomycin IV (trough 15-20) - 1/20/24 to 1/22/24  Daptomycin 6 mg/kg IV daily - 1/22/24 to present      MEDICATIONS:   Reviewed in EMR    REVIEW OF SYSTEMS:   Except as documented, all other systems reviewed and negative     PHYSICAL EXAM:   T 97.3 °F (36.3 °C)   BP (!) 131/102   P 99   RR 18   O2 (!) 93 %  GENERAL: Middle age WF with mental delay who is sleeping; does not appear ill overall; nonverbal    HEENT: atraumatic, anicteric, moist oral mucosa   LUNGS: breathing unlabored, lungs with a lot of course upper airway sounds and clear in bases  HEART: RRR; no obvious murmur, no rub or gallop  ABDOMEN: abdomen soft, nondistended, BS noted x 4 quadrants, no obvious rebound, guarding, or organomegaly; PEG button without erythema/drainage  : no obvious CVA tenderness; diapers  EXTREMITIES: no edema, clubbing, or cyanosis; + muscle wasting  MUSCULOSKELETAL: appears has lumbar / thoracic tenderness with palpation again today  SKIN: no obvious rashes or lesions  NEURO: nonverbal; LT sided weakness   PSYCH:  "cooperative  LINES: PIV     LABS AND IMAGING:     Recent Labs     01/25/24 0322 01/26/24 0317   WBC 14.11* 13.85*   RBC 3.92* 4.06*   HGB 11.7* 12.0   HCT 36.0* 37.1   MCV 91.8 91.4   MCH 29.8 29.6   MCHC 32.5* 32.3*   RDW 11.9 12.0   * 562*     No results for input(s): "LACTIC" in the last 72 hours.    No results for input(s): "INR", "APTT", "D-DIMER" in the last 72 hours.  No results for input(s): "HGBA1C", "CHOL", "TRIG", "LDL", "VLDL", "HDL" in the last 72 hours.   Recent Labs     01/24/24 0315 01/25/24 0322 01/26/24 0317    138 141   K 3.9 3.9 3.3*   CHLORIDE 104 103 103   CO2 25 25 24   BUN 6.6* 11.2 13.0   CREATININE 0.86 0.95 0.84   GLUCOSE 279* 307* 86   CALCIUM 8.9 9.3 9.0   ALBUMIN 2.7* 2.8* 2.9*   GLOBULIN 3.2 3.5 3.5   ALKPHOS 94 90 73   ALT 35 27 28   AST 20 14 18   BILITOT 0.2 0.2 0.2   CRP  --  27.90*  --    TSH 1.446  --   --      Recent Labs     01/25/24 0322   CPK 18*          Estimated Creatinine Clearance: 63.8 mL/min (based on SCr of 0.84 mg/dL).   Lab Results   Component Value Date    SEDRATE 53 (H) 01/25/2024      Lab Results   Component Value Date    CRP 27.90 (H) 01/25/2024        Micro:   Colonization:  1/19/24 Blood cultures x 2 with coag-negative Staph (RT AC with Staph capitis  in 1 of 1 bottle and LT AC with Staph epi in 1 of 2 bottles)  1/20/24 Blood cultures x 2 NGTD      NM Inflammatory Whole Body  Narrative: EXAMINATION:  NM INFLAMMATORY WHOLE BODY    CLINICAL HISTORY:  Infection of unknown etiology;    TECHNIQUE:  265 microcuries of indium 111 was utilized to tag the white blood cells.  Due to patient's condition spot images were performed of the chest, abdomen and the pelvis 4 hours and 24 hours into the study.    COMPARISON:  CT lumbar spine January 23, 2024.    FINDINGS:  There is no abnormal concentration of Indium 111 tagged white blood cells within the lumbar spine to reflect a focus of infection.  There is also no abnormal pooling of tagged white blood " cells within the chest, abdomen and pelvis.  Physiologic uptake is observed in the liver, spleen and the bone marrow.  Impression: No focus of infection identified.    Electronically signed by: Todd Leonardo  Date:    01/26/2024  Time:    12:01      ASSESSMENT & PLAN:   Coag-negative Staph bacteremia  Immunosuppressed state  History of renal transplant (2007)  Diabetes mellitus  History of Valprotic Acid Syndrome / Mental Delay / Nonverbal  Possible back pain (lumbar / thoracic)  Leukocytosis   Tachycardia  Loop recorder present  PEG        Coag-negative Staph bacteremia in the setting of immunosuppressed pt secondary to renal transplant (2007).   Pt is afebrile and occasional tachycardia. Remains with leukocytosis  Blood cultures with coag-negative Staph. Repeat blood cultures NGTD   TTE 1/22/24 negative for valvular vegetations  CT L-spine and T-spine with no acute abnormalities, but does show concerns for bone demineralization  Unable to do MRI d/t presence of loop recorder. Tagged WBC scan today  On reduced dose of immunosuppressive therapy  Even though these bacteria are usually considered contaminants in an immunocompetent individual, they are hard to ignore as pt is with immunocompromised state. Pt is not overtly ill appearing.  Pt appears to be showing clinical improvement        RECOMMENDATIONS:     Continue Daptomycin 6 mg/kg IV daily for coag-negative Staph bacteremia  Awaiting tagged WBC scan results. If this is negative, then likely would proceed with MRI (pt will need loop recorder removal for this to happen)  Still awaiting further testing to determine need for Midline vs PICC  Pt will need to be set up on  [facility] 5th floor infusion center when ready for discharge. Family does not feel comfortable doing IV therapy at home. Case management to assist with this   ID will continue to follow      CHANTELL Souza  [facility] Infectious Diseases

## 2024-01-26 NOTE — PROGRESS NOTES
Per Ginger with [facility], pt set up for OP infusion beginning tomorrow. Father has been contacted with schedule and instructions. Anticipate discharge this afternoon.   85

## 2024-01-26 NOTE — PLAN OF CARE
Problem: Skin Injury Risk Increased  Goal: Skin Health and Integrity  Outcome: Ongoing, Progressing  Intervention: Optimize Skin Protection  Flowsheets (Taken 1/26/2024 0739)  Head of Bed (HOB) Positioning: HOB at 20 degrees     Problem: Adult Inpatient Plan of Care  Goal: Plan of Care Review  Outcome: Ongoing, Progressing  Flowsheets (Taken 1/26/2024 0739)  Plan of Care Reviewed With:   patient   caregiver   father  Goal: Absence of Hospital-Acquired Illness or Injury  Outcome: Ongoing, Progressing  Intervention: Identify and Manage Fall Risk  Flowsheets (Taken 1/26/2024 0739)  Safety Promotion/Fall Prevention:   assistive device/personal item within reach   side rails raised x 2  Intervention: Prevent and Manage VTE (Venous Thromboembolism) Risk  Flowsheets (Taken 1/26/2024 0739)  Activity Management: Rolling - L1  Intervention: Prevent Infection  Flowsheets (Taken 1/26/2024 0739)  Infection Prevention:   hand hygiene promoted   rest/sleep promoted   single patient room provided  Goal: Optimal Comfort and Wellbeing  Outcome: Ongoing, Progressing  Intervention: Monitor Pain and Promote Comfort  Flowsheets (Taken 1/26/2024 0739)  Pain Management Interventions:   around-the-clock dosing utilized   quiet environment facilitated   relaxation techniques promoted  Intervention: Provide Person-Centered Care  Flowsheets (Taken 1/26/2024 0739)  Trust Relationship/Rapport:   care explained   reassurance provided   emotional support provided   thoughts/feelings acknowledged   empathic listening provided   questions encouraged   questions answered   choices provided     Problem: Infection  Goal: Absence of Infection Signs and Symptoms  Outcome: Ongoing, Progressing

## 2024-01-26 NOTE — TELEPHONE ENCOUNTER
Received notice pt is being d/c today.  Please sort a post d/c appt add on the morning of Wednesday 2/7/24.

## 2024-01-26 NOTE — DISCHARGE SUMMARY
[facility] Discharge Summary    Admitting Physician: Robbie Brownlee MD  Attending Physician: Ender Borges MD  Date of Admit: 1/20/2024  Date of Discharge: 1/26/2024    Condition: Stable  Outcome: Condition has improved and patient is now ready for discharge.  DISPOSITION: Home or Self Care    Discharge Diagnoses     Patient Active Problem List   Diagnosis    Post-transplant diabetes mellitus    Thyroid cancer    Hypothyroidism, postsurgical    Multinodular goiter    Leukocytosis    Abnormal liver function tests    Coag negative Staphylococcus bacteremia    Immunocompromised patient    History of renal transplant    Back pain    Tachycardia    Osteopenia       Principal Problem:  Coag negative Staphylococcus bacteremia    Consultants and Procedures     Consultants:  IP CONSULT TO INTERNAL MEDICINE  IP CONSULT TO INFECTIOUS DISEASES  IP CONSULT TO ENDOCRINOLOGY  IP CONSULT TO CARDIOLOGY  IP CONSULT TO CARDIOLOGY  IP CONSULT TO ENT  [facility]    Procedures:   Procedure(s) (LRB):  REMOVAL, IMPLANTABLE LOOP RECORDER (N/A)     Brief Admission History      28 y.o. female with PMHx of Renal transplant on immunosuppressants, DM-II, HTN, HLD, Papillary thyroid carcinoma s/p hemithyroidectomy with resulting Hypothyroidism, CVA with left-sided deficits, developmental delay, and dysphagia with PEG tube was sent to the ED with her father for sepsis work-up. Patient was admitted yesterday for tachycardia of 150bpm, improved with IVF hydration, and was discharged home earlier today. Father was called to bring the patient back to the ED for positive blood cultures that were obtained during admission. Father denies any new or worsening symptoms since discharge. He reports 1 episode of diarrhea today after increasing fluid intake due to hard stools. He reports bowl movements typically fluctuate between hard and soft stools. He denies recent fever, cough, congestion, vomiting, blood in stool,  rashes, and changes in baseline mentation. Patient is nonverbal at baseline.     In ED, patient is afebrile, normotensive, and tachycardic with pulse in 130s. Labs reveal WBC of 19.1, which appears at patient's baseline, stable H/H at 13.5/41.0, Lactic acid of 1.9, Glucose of 307, and slight hypomagnesemia at 1.5. Pt was given LR 1L bolus in ED. Repeat blood cultures were drawn. Internal medicine consulted for further management.    Hospital Course with Pertinent Findings     Patient admitted for positive blood cultures with separate bottles positive for 2 separate organisms, Staph epi and Staph capitis. She was treated with vancomycin initially which was changed to daptomycin due to concerns for kidney injury given patient is s/p renal transplant. She underwent multiple attempts at imaging of the thoracic and lumbar spine in search of a source of infection however a source was not found. She had a negative CT T and L spine for infectious findings but it did show degenerative changes as per report. MRI attempt was aborted due to loop recorder implant. Indium-111 scan was done which showed no focus of infection. The option was presented to family to have her loop recorder removed to undergo MRI vs daily antimicrobial infusions; they opted for the later. The plan for now is 14 days of daptomycin through 2/9/2024 with blood culture redraw after completion of antibiotics. The order for repeat blood cultures has been placed. Her Prograft and Cellcept doses were adjusted while on antibiotics with the blessings of her nephrologist Dr. Bryan. She was advised to follow-up with him outpatient for further recommendations going forward. Endocrinology recommended beginning Levemir 25 U qhs and titrate by 3 U every 3 days for goal glucose <130. She has an appointment with them on 2/7/2024 to address her chronic issues including osteopenia which was seen on her CT T and L spine. She will need a DEXA scan in the near future for  "further clarification of this issue. Return to ED precautions were discussed in depth with father at bedside. She had a midline placed on 1/26/2024 and will present to [facility] infusion clinic tomorrow morning at 0830 for continuation of antimicrobials.     Discharge physical exam:  Vitals  BP: 116/67  Temp: 97 °F (36.1 °C)  Temp Source: Axillary  Pulse: 89  Resp: 18  SpO2: 95 %  Height: 4' 1.02" (124.5 cm)  Weight: 49.9 kg (110 lb)    General: Nonverbal at baseline, not in acute distress  HEENT: NC/AT, conjunctiva normal, moist mucus membranes.  There is cerumen in the right ear.  CVS: RRR, no LE edema  Abdomen: soft, non-distended; PEG tube in place, insertion site clean and dry with no surrounding erythema.  MSK: No obvious deformity or joint swelling  Skin: Warm and dry, no open wounds or ulcers  Neuro: Awake and alert    TIME SPENT ON DISCHARGE: 60 minutes    Discharge Medications        Medication List        ASK your doctor about these medications      albuterol 90 mcg/actuation inhaler  Commonly known as: PROVENTIL/VENTOLIN HFA     amLODIPine 5 MG tablet  Commonly known as: NORVASC     AQUAPHOR HEALING 41 % Oint  Generic drug: white petrolatum     aspirin 325 MG tablet     atorvastatin 20 MG tablet  Commonly known as: LIPITOR     azelastine 137 mcg (0.1 %) nasal spray  Commonly known as: ASTELIN     BASAGLAR KWIKPEN U-100 INSULIN glargine 100 units/mL SubQ pen  Generic drug: insulin     cetirizine 1 mg/mL syrup  Commonly known as: ZYRTEC     FLOVENT HFA 44 mcg/actuation inhaler  Generic drug: fluticasone propionate     folic acid 1 MG tablet  Commonly known as: FOLVITE     glipiZIDE 5 MG tablet  Commonly known as: GLUCOTROL  Take 2 tablets (10 mg total) by mouth 2 (two) times daily with meals.     ipratropium 42 mcg (0.06 %) nasal spray  Commonly known as: ATROVENT     ketoconazole 2 % shampoo  Commonly known as: NIZORAL     lansoprazole 30 MG disintegrating tablet  Commonly known as: PREVACID SOLUTAB   " "  levocarnitine 100 mg/mL Soln  Commonly known as: CARNITOR     levothyroxine 125 MCG tablet  Commonly known as: SYNTHROID  Take half of a 125mcg (62.5mcg) tablet daily.     LEVSIN/SL 0.125 mg Subl  Generic drug: hyoscyamine     metoprolol tartrate 25 MG tablet  Commonly known as: LOPRESSOR     multivitamin Liqd     mupirocin 2 % ointment  Commonly known as: BACTROBAN     mycophenolate mofetil 200 mg/mL Susr  Commonly known as: CELLCEPT     ondansetron 4 mg/5 mL solution  Commonly known as: ZOFRAN     prednisoLONE 15 mg/5 mL (3 mg/mL) solution  Commonly known as: ORAPRED     tacrolimus 0.5 MG Cap  Commonly known as: PROGRAF     TRADJENTA 5 mg Tab tablet  Generic drug: linaGLIPtin  Take 1 tablet (5 mg total) by mouth once daily.     triamcinolone acetonide 0.025% 0.025 % cream  Commonly known as: KENALOG     * TRUEPLUS PEN NEEDLE 31 gauge x 3/16" Ndle  Generic drug: pen needle, diabetic     * pen needle, diabetic 31 gauge x 3/16" Ndle           * This list has 2 medication(s) that are the same as other medications prescribed for you. Read the directions carefully, and ask your doctor or other care provider to review them with you.                  Discharge Information:     - Take insulin detemir (Levemir) 25 units every evening, increase dose by 3 units every 3 nights to maintain a goal glucose level of less than 130  - Follow-up with you primary care provider within 2 weeks of discharge  - Follow-up with Nephrologist Dr. Bryan for proper Prograft and Cellcept dosing  - Go to infusion clinic at [facility] 5th floor daily for antibiotic infusions for 14 days with end date 2/9/2024  - Blood cultures are ordered to be collected after completion of antibiotics on 2/9/2024, present to lab at [facility] for culture blood draw  - Follow-up with [facility] endocrinology Dr. Moreno on 2/7/2024 for monitoring of thyroid, osteopenia, and diabetes    Haresh Cash MD  [facility]  PGY-II  "

## 2024-01-26 NOTE — PROGRESS NOTES
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  Endocrinology visit was unable to be completed at that time.  Patient was discharged the next day, blood cultures that were drawn ended up being positive for Staph epidermidis and patient was readmitted to the hospital at that time.  Endocrinology consulted for management of diabetes and history of thyroid cancer.    Interval history:  Patient lying in bed, appears comfortable and resting at this time.  Talked to father in room, no new issues overnight.  WBC scan done earlier today.  No other concerns at this time    Past Medical History:   Diagnosis Date    Cancer     Diabetes mellitus, type 2     Hypothyroidism     Kidney transplanted     Stroke      No past surgical history on file.    Social History     Socioeconomic History    Marital status: Single   Tobacco Use    Smoking status: Never    Smokeless tobacco: Never   Substance and Sexual Activity    Alcohol use: Never    Drug use: Never    Sexual activity: Never     Social Determinants of Health     Financial Resource Strain: Low Risk  (1/22/2024)    Overall Financial Resource Strain (CARDIA)     Difficulty of Paying Living Expenses: Not hard at all   Food Insecurity: No Food Insecurity (1/22/2024)    Hunger Vital Sign     Worried About Running Out of Food in the Last  Year: Never true     Ran Out of Food in the Last Year: Never true   Transportation Needs: Unknown (1/22/2024)    PRAPARE - Transportation     Lack of Transportation (Medical): No   Physical Activity: Inactive (1/22/2024)    Exercise Vital Sign     Days of Exercise per Week: 0 days     Minutes of Exercise per Session: 0 min   Stress: Patient Unable To Answer (1/22/2024)    [place] [employer] - Occupational Stress Questionnaire     Feeling of Stress : Patient unable to answer   Social Connections: Unknown (1/22/2024)    Social Connection and Isolation Panel [NHANES]     Frequency of Communication with Friends and Family: Never     Frequency of Social Gatherings with Friends and Family: More than three times a week     Marital Status: Never    Housing Stability: Low Risk  (1/22/2024)    Housing Stability Vital Sign     Unable to Pay for Housing in the Last Year: No     Number of Places Lived in the Last Year: 1     Unstable Housing in the Last Year: No     Current Outpatient Medications   Medication Instructions    albuterol (PROVENTIL/VENTOLIN HFA) 90 mcg/actuation inhaler 2 puffs, Inhalation, Every 6 hours PRN    amLODIPine (NORVASC) 5 mg, Oral    aspirin 325 mg, Per G Tube    atorvastatin (LIPITOR) 20 mg, Oral, Nightly    azelastine (ASTELIN) 137 mcg (0.1 %) nasal spray USE ONE SPRAY IN EACH NOSTRIL IN THE MORNING AND BEFORE BEDTIME    BASAGLAR KWIKPEN U-100 INSULIN 10 Units, Subcutaneous, Nightly    cetirizine (ZYRTEC) 10 mg, Oral    FLOVENT HFA 44 mcg/actuation inhaler Inhalation    folic acid (FOLVITE) 1,000 mcg, Oral    glipiZIDE (GLUCOTROL) 10 mg, Oral, 2 times daily with meals    ipratropium (ATROVENT) 42 mcg (0.06 %) nasal spray 2 sprays, Each Nostril    ketoconazole (NIZORAL) 2 % shampoo Topical, Three times weekly    lansoprazole (PREVACID SOLUTAB) 30 MG disintegrating tablet Oral    levocarnitine (CARNITOR) 100 mg/mL Soln 500 mg, Oral, 2 times daily    levothyroxine (SYNTHROID)  "125 MCG tablet Take half of a 125mcg (62.5mcg) tablet daily.    LEVSIN/SL 0.125 mg, Oral, 2 times daily PRN    metoprolol tartrate (LOPRESSOR) 25 MG tablet TAKE ONE TABLET BY MOUTH IN THE MORNING AND ONE TABLET BEFORE BEDTIME    multivitamin Liqd 5 mLs, FEEDING TUBE    mupirocin (BACTROBAN) 2 % ointment APPLY TOPICALLY THREE TIMES DAILY AS NEEDED FOR REDNESS AT PEG SITE    mycophenolate mofetil (CELLCEPT) 200 mg/mL SusR TAKE 2.5 MLS VIA PEG TWICE DAILY    ondansetron (ZOFRAN) 8 mg, Per G Tube    pen needle, diabetic 31 gauge x 3/16" Ndle Use daily    prednisoLONE (ORAPRED) 7.5 mg, Oral    tacrolimus (PROGRAF) 0.5 MG Cap take 2 tabs q am every MON, TUES, WED  TAKE 1 TAB Q PM ON MON, TUE, WED  TAKE 1 TAB BID Q TH, FR, SA & SUN    TRADJENTA 5 mg, Oral, Daily    triamcinolone acetonide 0.025% (KENALOG) 0.025 % cream Topical, 2 times daily PRN    TRUEPLUS PEN NEEDLE 31 gauge x 3/16" Ndle USE DAILY AS DIRECTED    white petrolatum (AQUAPHOR HEALING) 41 % Oint Topical   Current Inpatient Medications   amLODIPine  5 mg Oral Daily    aspirin  325 mg Per G Tube Daily    atorvastatin  20 mg Oral QHS    carbamide peroxide  4 drop Both Ears Every other day    cetirizine  10 mg Oral Daily    DAPTOmycin (CUBICIN) IV (PEDS and ADULTS)  6 mg/kg Intravenous Q24H    fluticasone propionate  2 spray Each Nostril Daily    folic acid  1,000 mcg Per G Tube Daily    hydrocortisone   Topical (Top) BID    insulin detemir U-100  25 Units Subcutaneous QHS    levocarnitine  500 mg Oral BID    levothyroxine  62.5 mcg Oral Before breakfast    metoprolol tartrate  25 mg Oral BID    multivitamin liquid  5 mL Per G Tube Daily    mycophenolate  500 mg Oral Every Tues, Thurs    prednisoLONE  7.5 mg Per G Tube Daily    [START ON 1/30/2024] tacrolimus  0.5 mg Per G Tube BID    [START ON 1/27/2024] tacrolimus  0.5 mg Oral Daily AM    [START ON 1/29/2024] tacrolimus  0.5 mg Per G Tube Daily AM    [START ON 1/31/2024] tacrolimus  0.5 mg Oral Daily AM "     Review of Systems   Unable to perform ROS: Patient nonverbal        Objective:       Intake/Output Summary (Last 24 hours) at 1/26/2024 1348  Last data filed at 1/25/2024 1646  Gross per 24 hour   Intake 780 ml   Output --   Net 780 ml       Vital Signs (Most Recent):  Temp: 97.4 °F (36.3 °C) (01/26/24 1217)  Pulse: 88 (01/26/24 1217)  Resp: 18 (01/26/24 0305)  BP: 122/74 (01/26/24 1217)  SpO2: 100 % (01/26/24 1215)  Body mass index is 32.19 kg/m².  Weight: 49.9 kg (110 lb) Vital Signs (24h Range):  Temp:  [95.9 °F (35.5 °C)-97.7 °F (36.5 °C)] 97.4 °F (36.3 °C)  Pulse:  [] 88  Resp:  [18-20] 18  SpO2:  [93 %-100 %] 100 %  BP: (115-131)/() 122/74     Physical Exam  Constitutional:       General: She is awake. She is not in acute distress.     Appearance: She is obese.      Comments: Non verbal at baseline   HENT:      Head: Atraumatic.      Comments: Congenital facial abnormality noted  Cardiovascular:      Rate and Rhythm: Tachycardia present.      Pulses: Normal pulses.      Heart sounds: No murmur heard.  Abdominal:      General: Abdomen is flat.      Palpations: Abdomen is soft.      Comments: PEG tube in place   Musculoskeletal:      Thoracic back: Tenderness present.      Lumbar back: Tenderness present.      Right lower leg: No edema.      Left lower leg: No edema.   Skin:     General: Skin is warm.   Neurological:      Mental Status: She is alert. Mental status is at baseline.       Lines/Drains/Airways       Drain  Duration                  Gastrostomy/Enterostomy LUQ feeding -- days              Peripheral Intravenous Line  Duration                  Peripheral IV - Single Lumen 01/20/24 2039 20 G Left 5 days                Significant Labs:  Lab Results   Component Value Date    WBC 13.85 (H) 01/26/2024    HGB 12.0 01/26/2024    HCT 37.1 01/26/2024    MCV 91.4 01/26/2024     (H) 01/26/2024   BMP  Lab Results   Component Value Date     01/26/2024    K 3.3 (L) 01/26/2024      "12/24/2023    CO2 24 01/26/2024    BUN 13.0 01/26/2024    CREATININE 0.84 01/26/2024    CALCIUM 9.0 01/26/2024    ANIONGAP 10 12/24/2023    ESTGFRAFRICA 125 12/24/2023    EGFRNONAA 74 (L) 10/12/2021   ABG  No results for input(s): "PH", "PO2", "PCO2", "HCO3", "BE" in the last 168 hours.    Assessment/Plan:     Type 2 diabetes secondary to immunosuppressants after kidney transplant  -discharge recommendations include 25 units of basal insulin nightly, can increase by 3 units every night until morning fasting sugar is below 130  -would recommend discontinuing glipizide.  Okay to continue Tradjenta at this time  - will follow-up blood sugars at next endocrinology appointment    History of papillary cell carcinoma s/p hemithyroidectomy   Postsurgical hypothyroidism  -thyroid ultrasound repeated inpatient, suboptimal exam and previously seen nodule not appreciable   -may need repeat sonogram as outpatient after acute illness  -okay to continue home dose of levothyroxine 62.5 mcg daily at discharge    [facility]    "

## 2024-01-26 NOTE — PLAN OF CARE
01/26/24 1334   Medicare Message   Important Message from Medicare regarding Discharge Appeal Rights Given to patient/caregiver;Explained to patient/caregiver;Signed/date by patient/caregiver   Date IMM was signed 01/26/24   Time IMM was signed 0654

## 2024-01-26 NOTE — PROGRESS NOTES
Per Christine Ibrahim, ID NP, pt will be followed at [facility] to complete 14 days of daptomycin (End date 2/3/2024), beginning tomorrow. Referral & ID recommendations faxed to infusion at [phone]. Pt will need dose of dapto prior to discharge. Relayed same to Dr. Omari Delatorre.

## 2024-01-27 ENCOUNTER — INFUSION (OUTPATIENT)
Dept: INFUSION THERAPY | Facility: HOSPITAL | Age: 29
End: 2024-01-27
Attending: FAMILY MEDICINE
Payer: MEDICARE

## 2024-01-27 VITALS
RESPIRATION RATE: 18 BRPM | TEMPERATURE: 98 F | SYSTOLIC BLOOD PRESSURE: 153 MMHG | DIASTOLIC BLOOD PRESSURE: 84 MMHG | OXYGEN SATURATION: 100 % | HEART RATE: 121 BPM

## 2024-01-27 DIAGNOSIS — D84.9 IMMUNOCOMPROMISED PATIENT: ICD-10-CM

## 2024-01-27 DIAGNOSIS — B95.7 COAG NEGATIVE STAPHYLOCOCCUS BACTEREMIA: Primary | ICD-10-CM

## 2024-01-27 DIAGNOSIS — E11.9 TYPE 2 DIABETES MELLITUS WITHOUT COMPLICATION, WITH LONG-TERM CURRENT USE OF INSULIN: Primary | ICD-10-CM

## 2024-01-27 DIAGNOSIS — Z79.4 TYPE 2 DIABETES MELLITUS WITHOUT COMPLICATION, WITH LONG-TERM CURRENT USE OF INSULIN: Primary | ICD-10-CM

## 2024-01-27 DIAGNOSIS — R78.81 COAG NEGATIVE STAPHYLOCOCCUS BACTEREMIA: Primary | ICD-10-CM

## 2024-01-27 DIAGNOSIS — Z94.0 HISTORY OF RENAL TRANSPLANT: ICD-10-CM

## 2024-01-27 PROCEDURE — 25000003 PHARM REV CODE 250: Performed by: NURSE PRACTITIONER

## 2024-01-27 PROCEDURE — 63600175 PHARM REV CODE 636 W HCPCS: Performed by: NURSE PRACTITIONER

## 2024-01-27 PROCEDURE — A4216 STERILE WATER/SALINE, 10 ML: HCPCS | Performed by: NURSE PRACTITIONER

## 2024-01-27 PROCEDURE — 96365 THER/PROPH/DIAG IV INF INIT: CPT

## 2024-01-27 RX ORDER — INSULIN DETEMIR 100 [IU]/ML
25 INJECTION, SOLUTION SUBCUTANEOUS NIGHTLY
Qty: 30 ML | Refills: 0 | Status: SHIPPED | OUTPATIENT
Start: 2024-01-27 | End: 2024-01-27

## 2024-01-27 RX ORDER — HEPARIN 100 UNIT/ML
500 SYRINGE INTRAVENOUS
Status: CANCELLED | OUTPATIENT
Start: 2024-01-28

## 2024-01-27 RX ORDER — SODIUM CHLORIDE 0.9 % (FLUSH) 0.9 %
10 SYRINGE (ML) INJECTION
Status: DISCONTINUED | OUTPATIENT
Start: 2024-01-27 | End: 2024-01-27 | Stop reason: HOSPADM

## 2024-01-27 RX ORDER — HEPARIN 100 UNIT/ML
500 SYRINGE INTRAVENOUS
Status: DISCONTINUED | OUTPATIENT
Start: 2024-01-27 | End: 2024-01-27 | Stop reason: HOSPADM

## 2024-01-27 RX ORDER — SODIUM CHLORIDE 0.9 % (FLUSH) 0.9 %
10 SYRINGE (ML) INJECTION
Status: CANCELLED | OUTPATIENT
Start: 2024-01-28

## 2024-01-27 RX ORDER — INSULIN DETEMIR 100 [IU]/ML
25 INJECTION, SOLUTION SUBCUTANEOUS NIGHTLY
Qty: 30 ML | Refills: 0 | Status: SHIPPED | OUTPATIENT
Start: 2024-01-27 | End: 2024-02-07 | Stop reason: SDUPTHER

## 2024-01-27 RX ADMIN — Medication 10 ML: at 08:01

## 2024-01-27 RX ADMIN — DAPTOMYCIN 300 MG: 500 INJECTION, POWDER, LYOPHILIZED, FOR SOLUTION INTRAVENOUS at 08:01

## 2024-01-27 NOTE — NURSING
0754 Patient is here for Daptomycin #1 of 7 with her dad via wheelchair. She is unable to hold conversation; makes sounds. Awake, alert , oriented x 1. Left arm midline flushed easily w/positive blood return.   4348 Infusion completed w/o incident.

## 2024-01-27 NOTE — DISCHARGE INSTRUCTIONS
- Take insulin detemir (Levemir) 25 units every evening, increase dose by 3 units every 3 nights to maintain a goal glucose level of less than 130  - Follow-up with you primary care provider within 2 weeks of discharge  - Follow-up with Nephrologist Dr. Bryan for proper Prograft and Cellcept dosing  - Go to infusion clinic at [facility] 5th floor daily for antibiotic infusions for 14 days with end date 2/9/2024  - Blood cultures are ordered to be collected after completion of antibiotics on 2/9/2024, present to lab at [facility] for culture blood draw  - Follow-up with [facility] endocrinology Dr. Moreno on 2/7/2024 for monitoring of thyroid, osteopenia, and diabetes

## 2024-01-27 NOTE — PROGRESS NOTES
Mother called on call phone requesting Brian Do. Order placed    Haresh Cash MD  [facility]  PGY-II

## 2024-01-27 NOTE — PROCEDURES
"Chiquis Velásquez is a 28 y.o. female patient.    Temp: 97.7 °F (36.5 °C) (01/26/24 1856)  Pulse: (!) 111 (01/26/24 1856)  Resp: 18 (01/26/24 1856)  BP: 125/73 (01/26/24 1856)  SpO2: 96 % (01/26/24 1856)  Weight: 49.9 kg (110 lb) (01/22/24 1213)  Height: 4' 1.02" (124.5 cm) (01/22/24 1213)    PICC  Date/Time: 1/26/2024 8:36 PM  Performed by: Reji Hazel RN  Consent Done: Yes  Time out: Immediately prior to procedure a time out was called to verify the correct patient, procedure, equipment, support staff and site/side marked as required  Indications: med administration and vascular access  Anesthesia: local infiltration  Local anesthetic: lidocaine 1% without epinephrine  Anesthetic Total (mL): 4  Preparation: skin prepped with ChloraPrep  Skin prep agent dried: skin prep agent completely dried prior to procedure  Sterile barriers: all five maximum sterile barriers used - cap, mask, sterile gown, sterile gloves, and large sterile sheet  Hand hygiene: hand hygiene performed prior to central venous catheter insertion  Location details: left brachial  Catheter type: single lumen  Catheter size: 4 Fr  Catheter Length: 12cm    Ultrasound guidance: yes  Vessel Caliber: medium and patent, compressibility normal  Needle advanced into vessel with real time Ultrasound guidance.  Guidewire confirmed in vessel.  Sterile sheath used.  Number of attempts: 1  Post-procedure: blood return through all ports, sterile dressing applied and chlorhexidine patch    Complications: none  Comments: Arm circ- 23cm        Reji Hazel RN  1/26/2024    "

## 2024-01-27 NOTE — NURSING
MD orders to d/c pt after midline placed. MAYITO Midline insertion completed by by JOSEPH Mendoza. Discharge summary reviewed with pt's caregiver/father. He verbalized understanding. Pt's father stated that the pharmacy pt's meds were called into is closed on the weekends. Dr. Vance notified and stated she would send in prescription for pt's levemir to [facility] in [place], [place] per pt's father's request.  IV removed, catheter intact. Pt transferred to wheelchair by pt's father and CNA. Pt left by personal vehicle. No signs of distress, no complaints expressed.

## 2024-01-28 ENCOUNTER — INFUSION (OUTPATIENT)
Dept: INFUSION THERAPY | Facility: HOSPITAL | Age: 29
End: 2024-01-28
Attending: FAMILY MEDICINE
Payer: MEDICARE

## 2024-01-28 VITALS
OXYGEN SATURATION: 100 % | SYSTOLIC BLOOD PRESSURE: 134 MMHG | HEART RATE: 97 BPM | DIASTOLIC BLOOD PRESSURE: 86 MMHG | TEMPERATURE: 98 F | RESPIRATION RATE: 18 BRPM

## 2024-01-28 DIAGNOSIS — D84.9 IMMUNOCOMPROMISED PATIENT: ICD-10-CM

## 2024-01-28 DIAGNOSIS — R78.81 COAG NEGATIVE STAPHYLOCOCCUS BACTEREMIA: Primary | ICD-10-CM

## 2024-01-28 DIAGNOSIS — B95.7 COAG NEGATIVE STAPHYLOCOCCUS BACTEREMIA: Primary | ICD-10-CM

## 2024-01-28 DIAGNOSIS — Z94.0 HISTORY OF RENAL TRANSPLANT: ICD-10-CM

## 2024-01-28 PROCEDURE — 25000003 PHARM REV CODE 250: Performed by: NURSE PRACTITIONER

## 2024-01-28 PROCEDURE — 63600175 PHARM REV CODE 636 W HCPCS: Performed by: NURSE PRACTITIONER

## 2024-01-28 PROCEDURE — A4216 STERILE WATER/SALINE, 10 ML: HCPCS | Performed by: NURSE PRACTITIONER

## 2024-01-28 RX ORDER — SODIUM CHLORIDE 0.9 % (FLUSH) 0.9 %
10 SYRINGE (ML) INJECTION
Status: DISCONTINUED | OUTPATIENT
Start: 2024-01-28 | End: 2024-01-28 | Stop reason: HOSPADM

## 2024-01-28 RX ADMIN — DAPTOMYCIN 300 MG: 500 INJECTION, POWDER, LYOPHILIZED, FOR SOLUTION INTRAVENOUS at 09:01

## 2024-01-28 RX ADMIN — Medication 10 ML: at 09:01

## 2024-01-29 ENCOUNTER — INFUSION (OUTPATIENT)
Dept: INFUSION THERAPY | Facility: HOSPITAL | Age: 29
End: 2024-01-29
Attending: INTERNAL MEDICINE
Payer: MEDICARE

## 2024-01-29 VITALS
RESPIRATION RATE: 20 BRPM | DIASTOLIC BLOOD PRESSURE: 87 MMHG | HEART RATE: 100 BPM | SYSTOLIC BLOOD PRESSURE: 137 MMHG | TEMPERATURE: 99 F | OXYGEN SATURATION: 98 %

## 2024-01-29 DIAGNOSIS — D84.9 IMMUNOCOMPROMISED PATIENT: ICD-10-CM

## 2024-01-29 DIAGNOSIS — R78.81 COAG NEGATIVE STAPHYLOCOCCUS BACTEREMIA: Primary | ICD-10-CM

## 2024-01-29 DIAGNOSIS — B95.7 COAG NEGATIVE STAPHYLOCOCCUS BACTEREMIA: Primary | ICD-10-CM

## 2024-01-29 DIAGNOSIS — Z94.0 HISTORY OF RENAL TRANSPLANT: ICD-10-CM

## 2024-01-29 PROCEDURE — 25000003 PHARM REV CODE 250: Performed by: NURSE PRACTITIONER

## 2024-01-29 PROCEDURE — 63600175 PHARM REV CODE 636 W HCPCS: Performed by: NURSE PRACTITIONER

## 2024-01-29 PROCEDURE — 96365 THER/PROPH/DIAG IV INF INIT: CPT

## 2024-01-29 RX ADMIN — DAPTOMYCIN 300 MG: 500 INJECTION, POWDER, LYOPHILIZED, FOR SOLUTION INTRAVENOUS at 12:01

## 2024-01-30 ENCOUNTER — INFUSION (OUTPATIENT)
Dept: INFUSION THERAPY | Facility: HOSPITAL | Age: 29
End: 2024-01-30
Attending: INTERNAL MEDICINE
Payer: MEDICARE

## 2024-01-30 VITALS
TEMPERATURE: 98 F | HEART RATE: 113 BPM | RESPIRATION RATE: 20 BRPM | DIASTOLIC BLOOD PRESSURE: 65 MMHG | SYSTOLIC BLOOD PRESSURE: 127 MMHG

## 2024-01-30 DIAGNOSIS — R78.81 COAG NEGATIVE STAPHYLOCOCCUS BACTEREMIA: Primary | ICD-10-CM

## 2024-01-30 DIAGNOSIS — B95.7 COAG NEGATIVE STAPHYLOCOCCUS BACTEREMIA: Primary | ICD-10-CM

## 2024-01-30 DIAGNOSIS — Z94.0 HISTORY OF RENAL TRANSPLANT: ICD-10-CM

## 2024-01-30 DIAGNOSIS — D84.9 IMMUNOCOMPROMISED PATIENT: ICD-10-CM

## 2024-01-30 PROCEDURE — 96365 THER/PROPH/DIAG IV INF INIT: CPT

## 2024-01-30 PROCEDURE — 25000003 PHARM REV CODE 250: Performed by: NURSE PRACTITIONER

## 2024-01-30 PROCEDURE — 63600175 PHARM REV CODE 636 W HCPCS: Performed by: NURSE PRACTITIONER

## 2024-01-30 RX ADMIN — DAPTOMYCIN 300 MG: 500 INJECTION, POWDER, LYOPHILIZED, FOR SOLUTION INTRAVENOUS at 11:01

## 2024-01-30 NOTE — NURSING
Pt in WC accompanied by father for Day 4/7 Daptomycin; pt is nonverbal, wheelchair bound and has a PEG tube that is clamped; pt received Day 4 infusion without incident.

## 2024-01-31 ENCOUNTER — INFUSION (OUTPATIENT)
Dept: INFUSION THERAPY | Facility: HOSPITAL | Age: 29
End: 2024-01-31
Attending: INTERNAL MEDICINE
Payer: MEDICARE

## 2024-01-31 ENCOUNTER — DOCUMENTATION ONLY (OUTPATIENT)
Dept: INFECTIOUS DISEASES | Facility: HOSPITAL | Age: 29
End: 2024-01-31
Payer: MEDICARE

## 2024-01-31 VITALS
DIASTOLIC BLOOD PRESSURE: 96 MMHG | TEMPERATURE: 99 F | OXYGEN SATURATION: 95 % | RESPIRATION RATE: 20 BRPM | HEART RATE: 100 BPM | SYSTOLIC BLOOD PRESSURE: 141 MMHG

## 2024-01-31 DIAGNOSIS — D84.9 IMMUNOCOMPROMISED PATIENT: ICD-10-CM

## 2024-01-31 DIAGNOSIS — B95.7 COAG NEGATIVE STAPHYLOCOCCUS BACTEREMIA: Primary | ICD-10-CM

## 2024-01-31 DIAGNOSIS — R78.81 COAG NEGATIVE STAPHYLOCOCCUS BACTEREMIA: Primary | ICD-10-CM

## 2024-01-31 DIAGNOSIS — Z94.0 HISTORY OF RENAL TRANSPLANT: ICD-10-CM

## 2024-01-31 PROCEDURE — 96365 THER/PROPH/DIAG IV INF INIT: CPT

## 2024-01-31 PROCEDURE — 63600175 PHARM REV CODE 636 W HCPCS: Performed by: NURSE PRACTITIONER

## 2024-01-31 PROCEDURE — 25000003 PHARM REV CODE 250: Performed by: NURSE PRACTITIONER

## 2024-01-31 RX ADMIN — DAPTOMYCIN 300 MG: 500 INJECTION, POWDER, LYOPHILIZED, FOR SOLUTION INTRAVENOUS at 10:01

## 2024-01-31 NOTE — NURSING
Patient arrived in wheelchair escorted by her father to infusion.  Pt does not answer questions.  Her father is at chairside for assistance.  Pt is here for #5/7 Daptomycin.  Pt tolerated well.    Rosa Mario RN

## 2024-01-31 NOTE — PROGRESS NOTES
[facility]  Infectious Diseases OPAT Lab Review Note      Medication: Daptomycin 500 mg IV daily to complete 14 days    Infusion Company: [facility] 5th floor infusion center    Start date: 1/22/24  Stop date: 2/3/24      Labs reviewed:     Lab Results   Component Value Date    WBC 18.58 (H) 01/29/2024    HGB 11.9 (L) 01/29/2024    HCT 37.7 01/29/2024    MCV 93.8 01/29/2024     (H) 01/29/2024        CMP  Sodium   Date Value Ref Range Status   12/24/2023 142 136 - 145 mmol/L Final     Sodium Level   Date Value Ref Range Status   01/29/2024 138 136 - 145 mmol/L Final     Potassium   Date Value Ref Range Status   12/24/2023 3.5 3.5 - 5.1 mmol/L Final     Potassium Level   Date Value Ref Range Status   01/29/2024 5.1 3.5 - 5.1 mmol/L Final     Chloride   Date Value Ref Range Status   12/24/2023 108 100 - 109 mmol/L Final     Carbon Dioxide   Date Value Ref Range Status   01/29/2024 23 22 - 29 mmol/L Final   12/24/2023 24 22 - 33 mmol/L Final     Blood Urea Nitrogen   Date Value Ref Range Status   01/29/2024 17.2 7.0 - 18.7 mg/dL Final   12/24/2023 5 5 - 25 mg/dL Final     Creatinine   Date Value Ref Range Status   01/29/2024 0.97 0.55 - 1.02 mg/dL Final   12/24/2023 0.64 0.57 - 1.25 mg/dL Final     Calcium   Date Value Ref Range Status   12/24/2023 7.6 (L) 8.8 - 10.6 mg/dL Final     Calcium Level Total   Date Value Ref Range Status   01/29/2024 9.1 8.4 - 10.2 mg/dL Final     Albumin Level   Date Value Ref Range Status   01/29/2024 3.6 3.5 - 5.0 g/dL Final   12/22/2023 4.1 3.5 - 5.0 g/dl Final     Bilirubin Total   Date Value Ref Range Status   01/29/2024 0.4 <=1.5 mg/dL Final     Alkaline Phosphatase   Date Value Ref Range Status   01/29/2024 74 40 - 150 unit/L Final     AST   Date Value Ref Range Status   07/12/2021 22 10 - 58 U/L Final     Aspartate Aminotransferase   Date Value Ref Range Status   01/29/2024 18 5 - 34 unit/L Final     ALT   Date Value Ref Range Status   07/12/2021 57 (H) 5  - 50 U/L Final     Alanine Aminotransferase   Date Value Ref Range Status   01/29/2024 27 0 - 55 unit/L Final     Anion Gap   Date Value Ref Range Status   12/24/2023 10 8 - 16 mmol/L Final     eGFR   Date Value Ref Range Status   01/29/2024 >60 mls/min/1.73/m2 Final      Lab Results   Component Value Date    SEDRATE 19 01/29/2024      Lab Results   Component Value Date    CRP 12.10 (H) 01/29/2024 1/22/24 CK level 23 (baseline)  1/25/24 CK level 18   1/29/24 CK level 22      Assessment / Plan:   Coag-negative Staph bacteremia  Chronic leukocytosis  Elevated glucose in the setting of diabetes  Mental delay / nonverbal  Immunosuppressed pt  Renal transplant      Again with chronic leukocytosis. Inflammatory markers with improvement  Spoke with mother, Shira, who states pt has been doing fine. No obvious complaints/symptoms. She states pt has not been having any back issues that she is aware of, but they have no specially palpated that area. Requested that they do this. Reviewed current labs with mother. Advised that if pt started with fever, chills, night sweats, N/V, back tenderness, etc then she needs to come in for further evaluation. She verbalized understanding  Pt needs better glucose control  Stressed to keep pt hydrated  Primary team to address chronic leukocytosis (going on for years)  Pt will need repeat blood cultures on 2/9/24. Stressed to mother who verbalized understanding        Follow up ID appointment: none

## 2024-02-01 ENCOUNTER — INFUSION (OUTPATIENT)
Dept: INFUSION THERAPY | Facility: HOSPITAL | Age: 29
End: 2024-02-01
Attending: INTERNAL MEDICINE
Payer: MEDICARE

## 2024-02-01 VITALS
OXYGEN SATURATION: 100 % | RESPIRATION RATE: 20 BRPM | DIASTOLIC BLOOD PRESSURE: 85 MMHG | TEMPERATURE: 98 F | HEART RATE: 117 BPM | SYSTOLIC BLOOD PRESSURE: 137 MMHG

## 2024-02-01 DIAGNOSIS — R78.81 COAG NEGATIVE STAPHYLOCOCCUS BACTEREMIA: Primary | ICD-10-CM

## 2024-02-01 DIAGNOSIS — B95.7 COAG NEGATIVE STAPHYLOCOCCUS BACTEREMIA: Primary | ICD-10-CM

## 2024-02-01 DIAGNOSIS — Z94.0 HISTORY OF RENAL TRANSPLANT: ICD-10-CM

## 2024-02-01 DIAGNOSIS — D84.9 IMMUNOCOMPROMISED PATIENT: ICD-10-CM

## 2024-02-01 PROCEDURE — 63600175 PHARM REV CODE 636 W HCPCS: Performed by: NURSE PRACTITIONER

## 2024-02-01 PROCEDURE — 96365 THER/PROPH/DIAG IV INF INIT: CPT

## 2024-02-01 PROCEDURE — 25000003 PHARM REV CODE 250: Performed by: NURSE PRACTITIONER

## 2024-02-01 RX ADMIN — DAPTOMYCIN 300 MG: 500 INJECTION, POWDER, LYOPHILIZED, FOR SOLUTION INTRAVENOUS at 12:02

## 2024-02-02 ENCOUNTER — INFUSION (OUTPATIENT)
Dept: INFUSION THERAPY | Facility: HOSPITAL | Age: 29
End: 2024-02-02
Attending: INTERNAL MEDICINE
Payer: MEDICARE

## 2024-02-02 VITALS
OXYGEN SATURATION: 97 % | TEMPERATURE: 98 F | DIASTOLIC BLOOD PRESSURE: 73 MMHG | RESPIRATION RATE: 20 BRPM | HEART RATE: 112 BPM | SYSTOLIC BLOOD PRESSURE: 125 MMHG

## 2024-02-02 DIAGNOSIS — R78.81 COAG NEGATIVE STAPHYLOCOCCUS BACTEREMIA: Primary | ICD-10-CM

## 2024-02-02 DIAGNOSIS — B95.7 COAG NEGATIVE STAPHYLOCOCCUS BACTEREMIA: Primary | ICD-10-CM

## 2024-02-02 DIAGNOSIS — D84.9 IMMUNOCOMPROMISED PATIENT: ICD-10-CM

## 2024-02-02 DIAGNOSIS — Z94.0 HISTORY OF RENAL TRANSPLANT: ICD-10-CM

## 2024-02-02 PROCEDURE — 96365 THER/PROPH/DIAG IV INF INIT: CPT

## 2024-02-02 PROCEDURE — 25000003 PHARM REV CODE 250: Performed by: NURSE PRACTITIONER

## 2024-02-02 PROCEDURE — 63600175 PHARM REV CODE 636 W HCPCS: Performed by: NURSE PRACTITIONER

## 2024-02-02 RX ADMIN — DAPTOMYCIN 300 MG: 500 INJECTION, POWDER, LYOPHILIZED, FOR SOLUTION INTRAVENOUS at 12:02

## 2024-02-02 NOTE — NURSING
Pt arrived in WC accompanied by father, pt is alert, follows simple commands and is nonverbal; pt received dose # 7/7 Daptomycin and midline removed with tip intact without incident.

## 2024-02-07 ENCOUNTER — OFFICE VISIT (OUTPATIENT)
Dept: ENDOCRINOLOGY | Facility: CLINIC | Age: 29
End: 2024-02-07
Payer: MEDICARE

## 2024-02-07 VITALS
WEIGHT: 110 LBS | SYSTOLIC BLOOD PRESSURE: 132 MMHG | TEMPERATURE: 98 F | HEIGHT: 55 IN | BODY MASS INDEX: 25.46 KG/M2 | DIASTOLIC BLOOD PRESSURE: 79 MMHG | HEART RATE: 94 BPM

## 2024-02-07 DIAGNOSIS — C73 THYROID CANCER: ICD-10-CM

## 2024-02-07 DIAGNOSIS — E13.9 POST-TRANSPLANT DIABETES MELLITUS: Primary | ICD-10-CM

## 2024-02-07 DIAGNOSIS — R78.81 COAG NEGATIVE STAPHYLOCOCCUS BACTEREMIA: Primary | ICD-10-CM

## 2024-02-07 DIAGNOSIS — M85.88 OSTEOPENIA OF SPINE: ICD-10-CM

## 2024-02-07 DIAGNOSIS — E89.0 POSTSURGICAL HYPOTHYROIDISM: ICD-10-CM

## 2024-02-07 DIAGNOSIS — B95.7 COAG NEGATIVE STAPHYLOCOCCUS BACTEREMIA: Primary | ICD-10-CM

## 2024-02-07 PROCEDURE — 99213 OFFICE O/P EST LOW 20 MIN: CPT | Mod: PBBFAC

## 2024-02-07 RX ORDER — INSULIN DETEMIR 100 [IU]/ML
28 INJECTION, SOLUTION SUBCUTANEOUS NIGHTLY
Qty: 30 ML | Refills: 11 | Status: SHIPPED | OUTPATIENT
Start: 2024-02-07 | End: 2024-05-16

## 2024-02-07 RX ORDER — MYCOPHENOLATE MOFETIL 200 MG/ML
500 POWDER, FOR SUSPENSION ORAL
COMMUNITY
Start: 2024-02-01

## 2024-02-07 NOTE — PROGRESS NOTES
[facility] INTERNAL MEDICINE  ENDOCRINOLOGY OFFICE VISIT NOTE    SUBJECTIVE:      HPI: Chiquis Velásquez is a 28 y.o. female with PMH of diabetes secondary to immunosuppressive medications, papillary thyroid carcinoma s/p hemithyroidectomy, postsurgical hypothyroidism, renal transplant secondary to Depakote exposure in utero, and mental delay. History limited secondary to patient being nonverbal. Patient's mother and father in room provided history. She was last seen in Endocrine clinic on 1/19/24 at which time she was found to have significant tachycardia. Endocrine visit was unable to be completed at that time. Patient was later admitted to the hospital for blood cultures positive for Staph epidermidis and was treated with IV antibiotic therapy. Endocrine was consulted during her time as inpatient at which time her Levemir was increased to 20 units nightly and thyroid labs were repeated with thyroid US. She was discharged on 1/26/24 and finished her course of antibiotics on 2/2/24. At the time of discharge patient was advised to increase Levemir to 25 units nightly and to increase 3 units every 3 units for glucose goal of less than 130. Patient's mother reports that she has been well since she was discharged. States that her glucose yesterday morning was 170, and it typically does not go above 200. She receives PEG tube feedings overnight and has water throughout the day. She continues to take Levemir 25 units nightly, Tradjenta 5mg daily, and levothyroxine 62.5mcg daily. She discontinued glipizide 10mg BID upon discharge form the hospital. Reports that she has not had a diabetic eye exam in some time and does not have appointment scheduled.     ROS:  ROS not obtained secondary to patient being nonverbal.    Past Medical History:   Diagnosis Date    Cancer     Diabetes mellitus, type 2     Hypothyroidism     Kidney transplanted     Stroke       Current Outpatient Medications   Medication Instructions    albuterol  "(PROVENTIL/VENTOLIN HFA) 90 mcg/actuation inhaler 2 puffs, Inhalation, Every 6 hours PRN    amLODIPine (NORVASC) 5 mg, Oral    aspirin 325 mg, Per G Tube    atorvastatin (LIPITOR) 20 mg, Oral, Nightly    azelastine (ASTELIN) 137 mcg (0.1 %) nasal spray USE ONE SPRAY IN EACH NOSTRIL IN THE MORNING AND BEFORE BEDTIME    carbamide peroxide (DEBROX) 6.5 % otic solution 4 drops, Both Ears, Daily    cetirizine (ZYRTEC) 10 mg, Oral    FLOVENT HFA 44 mcg/actuation inhaler Inhalation    folic acid (FOLVITE) 1,000 mcg, Oral    ipratropium (ATROVENT) 42 mcg (0.06 %) nasal spray 2 sprays, Each Nostril    ketoconazole (NIZORAL) 2 % shampoo Topical, Three times weekly    lansoprazole (PREVACID SOLUTAB) 30 MG disintegrating tablet Oral    LEVEMIR FLEXPEN 28 Units, Subcutaneous, Nightly    levocarnitine (CARNITOR) 100 mg/mL Soln 500 mg, Oral, 2 times daily    levothyroxine (SYNTHROID) 125 MCG tablet Take half of a 125mcg (62.5mcg) tablet daily.    LEVSIN/SL 0.125 mg, Oral, 2 times daily PRN    metoprolol tartrate (LOPRESSOR) 25 MG tablet TAKE ONE TABLET BY MOUTH IN THE MORNING AND ONE TABLET BEFORE BEDTIME    multivitamin Liqd 5 mLs, FEEDING TUBE    mupirocin (BACTROBAN) 2 % ointment APPLY TOPICALLY THREE TIMES DAILY AS NEEDED FOR REDNESS AT PEG SITE    mycophenolate mofetil (CELLCEPT) 500 mg, Oral    ondansetron (ZOFRAN) 8 mg, Per G Tube    pen needle, diabetic 31 gauge x 3/16" Ndle Use daily    prednisoLONE (ORAPRED) 7.5 mg, Oral    sodium chloride 0.9% SolP 50 mL with DAPTOmycin 350 mg SolR 350 mg 6 mg/kg, Intravenous, Daily    tacrolimus (PROGRAF) 0.5 MG Cap take 2 tabs q am every MON, TUES, WED  TAKE 1 TAB Q PM ON MON, TUE, WED  TAKE 1 TAB BID Q TH, FR, SA & SUN    TRADJENTA 5 mg, Oral, Daily    triamcinolone acetonide 0.025% (KENALOG) 0.025 % cream Topical, 2 times daily PRN    TRUEPLUS PEN NEEDLE 31 gauge x 3/16" Ndle USE DAILY AS DIRECTED    white petrolatum (AQUAPHOR HEALING) 41 % Oint Topical         OBJECTIVE:     Vital " "signs:   /79 (BP Location: Right arm, Patient Position: Sitting, BP Method: Medium (Automatic))   Pulse 94   Temp 97.5 °F (36.4 °C) (Oral)   Ht 4' 1" (1.245 m) Comment: Last recorded 24- confirmed with parent  Wt 49.9 kg (110 lb) Comment: Last recorded 24- confirmed with parent  BMI 32.21 kg/m²      Physical Examination:  General: Patient resting comfortably in wheelchair. No acute distress. Not ill-appearing. Vocalizing during exam.   Patient nonverbal at baseline.  HEENT: Atraumatic. Congenital facial abnormalities noted.  Cardiovascular: Regular rate & rhythm  Pulmonary: Bilateral symmetric chest rise, no increased work of breathing  Abdominal: Non-distended, PEG tube in place  Skin: Exposed skin is warm & dry.    Labs:  2024 TSH - 1.446  2024 Thyroglobulin Ab - <1.8  2024 Thyroglobulin Tumor Marker - 3.7  2024 Vitamin D - 43.2  2024 A1C - 8.8    Imagin2024 Thyroid US - Suboptimal exam. Previously seen thyroid nodule not appreciable. Questionable remnant thyroid tissue versus fat or nodule at the right thyroid bed. Suggest outpatient follow-up thyroid sonogram after resolution of patient's acute illness.      ASSESSMENT & PLAN:     Encounter Diagnoses   Name Primary?    Post-transplant diabetes mellitus Yes    Thyroid cancer     Postsurgical hypothyroidism     Osteopenia of spine        Post Transplant Diabetes mellitus  - Last A1c 8.8 on 24, up from 7.4 one year ago  - Previously was on metformin but had this discontinued secondary to history of renal transplant  - Glipizide was discontinued upon discharge from the hospital  - Patient on continuous tube feeds at night at home   - Home regimen includes Tradjenta 5mg daily and Levemir 25 units nightly  - Family reports CBGs ranging from 150-170, elevated glucose partially due to prednisolone use   - Will increase Levemir to 28 units nightly with goal fasting glucose   - Continue Tradjenta 5mg " daily  - Patient's family would prefer to wait on diabetic eye exam at this time     History of papillary cell carcinoma s/p hemithyroidectomy   Postsurgical hypothyroidism  - Patient had 5-6mm hypoechoic nodule on left thyroid lobe on thyroid US in 10/2022   - Patient currently takes levothyroxine 62.5 mcg daily  - TSH on 1/24/24 1.446, however may be elevated compared to prior TSH due to patient not receiving levothyroxine at beginning of hospitalization  - Thyroid US repeated during hospital stay however exam was suboptimal and nodule was not appreciated, questionable remnant tissue of right thyroid  - Discussed option to repeat thyroid US for surveillance, however family would prefer to wait to repeat US at this time  - Continue levothyroxine 62.5mcg daily     Osteopenia  - Patient's mother reported history of osteopenia during hospital stay but denied bisphosphonate use in the past  - CT Thoracic spine on 1/23/24 revealed extensive loss of bone mineral density  - Patient on home steroids for immunosuppression after renal transplant  - Vitamin D on 1/24/24 WNL  - Ordered DEXA scan to further evaluate bone density      Follow up in about 4 months (around 6/7/2024).     Tara Gupta, MS4  [facility] [employer] [facility] [place]

## 2024-02-09 ENCOUNTER — TELEPHONE (OUTPATIENT)
Dept: ENDOCRINOLOGY | Facility: CLINIC | Age: 29
End: 2024-02-09
Payer: MEDICARE

## 2024-02-09 NOTE — TELEPHONE ENCOUNTER
----- Message from Clifford Deras MA sent at 2/8/2024  3:33 PM CST -----  Regarding: FW: Results  GUSTAVO PT       Pt's mom is referring to blood culture results.     ----- Message -----  From: Chanda Whitaker  Sent: 2/8/2024   3:22 PM CST  To: [facility] Endocrinology Clinical Support Staff  Subject: Results                                          STOUT PT         Pt's mom (Iram) LVM requesting a call back for lab results.   IRAM # [phone]

## 2024-02-09 NOTE — TELEPHONE ENCOUNTER
Mother called back this am she wanted to see if blood cultures showed anything yet. Explained as of 24 hours no growth.    Epidermal Closure Graft Donor Site (Optional): running

## 2024-02-14 ENCOUNTER — TELEPHONE (OUTPATIENT)
Dept: INFECTIOUS DISEASES | Facility: CLINIC | Age: 29
End: 2024-02-14
Payer: MEDICARE

## 2024-02-14 NOTE — TELEPHONE ENCOUNTER
Blood cultures finalized as negative. Although this does not 100% rule out osteomyelitis of her spine this does make it less likely. Please have them continue to monitor the patient and if she becomes febrile or they notice worsening back pain we will have to pursue the MRI of her back at that time.    Jerry Conteh MD  [facility] [employer]   of Medicine

## 2024-02-14 NOTE — TELEPHONE ENCOUNTER
Shira (mom) called, gave her Dr Conteh's message below. She verbalized understanding to contact us if Chiquis has a fever or seems to have pain in her back,

## 2024-02-14 NOTE — TELEPHONE ENCOUNTER
----- Message from Clifford Deras MA sent at 2/14/2024 11:06 AM CST -----  Regarding: FW: Results  GUSTAVO PT     ----- Message -----  From: Chanda Whitaker  Sent: 2/14/2024  10:56 AM CST  To: [facility] Endocrinology Clinical Support Staff  Subject: Results                                          RAYMOND PT       Shira ARGUELLES requesting a call back regarding the pt's cultures.   Pt # [phone]

## 2024-05-16 DIAGNOSIS — E13.9 POST-TRANSPLANT DIABETES MELLITUS: Primary | ICD-10-CM

## 2024-05-16 RX ORDER — LINAGLIPTIN 5 MG/1
5 TABLET, FILM COATED ORAL DAILY
Qty: 30 TABLET | Refills: 3 | Status: SHIPPED | OUTPATIENT
Start: 2024-05-16 | End: 2024-05-20

## 2024-05-16 RX ORDER — INSULIN GLARGINE 100 [IU]/ML
24 INJECTION, SOLUTION SUBCUTANEOUS DAILY
Qty: 9 ML | Refills: 3 | Status: SHIPPED | OUTPATIENT
Start: 2024-05-16 | End: 2024-10-13

## 2024-05-16 NOTE — TELEPHONE ENCOUNTER
----- Message from Vianney Matt sent at 5/16/2024 11:25 AM CDT -----  Pt of [place]      Pt mother called stating that she have been trying to speak with  for a refill on pt LEVEMIR medication.    Pt mother stated that the insulin will soon be discontinued.    Pt also needs refill for blood pressure medication.    Pt mother requesting a call back.    Pt mother number [phone]      Please advise

## 2024-05-20 DIAGNOSIS — E13.9 POST-TRANSPLANT DIABETES MELLITUS: Primary | ICD-10-CM

## 2024-05-20 NOTE — TELEPHONE ENCOUNTER
Received notice from pt's insurance, Tradjenta is no longer covered, formulary alternatives are Januvia and Saxagliptin.

## 2024-09-17 ENCOUNTER — OFFICE VISIT (OUTPATIENT)
Dept: ENDOCRINOLOGY | Facility: CLINIC | Age: 29
End: 2024-09-17
Payer: MEDICARE

## 2024-09-17 ENCOUNTER — TELEPHONE (OUTPATIENT)
Dept: ENDOCRINOLOGY | Facility: CLINIC | Age: 29
End: 2024-09-17

## 2024-09-17 VITALS
RESPIRATION RATE: 12 BRPM | HEART RATE: 72 BPM | DIASTOLIC BLOOD PRESSURE: 62 MMHG | SYSTOLIC BLOOD PRESSURE: 110 MMHG | HEIGHT: 55 IN | BODY MASS INDEX: 32.21 KG/M2

## 2024-09-17 DIAGNOSIS — M85.80 OSTEOPENIA, UNSPECIFIED LOCATION: ICD-10-CM

## 2024-09-17 DIAGNOSIS — E13.9 POST-TRANSPLANT DIABETES MELLITUS: Primary | ICD-10-CM

## 2024-09-17 DIAGNOSIS — C73 THYROID CANCER: ICD-10-CM

## 2024-09-17 DIAGNOSIS — E89.0 HYPOTHYROIDISM, POSTSURGICAL: ICD-10-CM

## 2024-09-17 LAB
HBA1C MFR BLD: 9.4 %
TSH SERPL-ACNC: 0.97 UIU/ML (ref 0.35–4.94)

## 2024-09-17 PROCEDURE — 99214 OFFICE O/P EST MOD 30 MIN: CPT | Mod: S$PBB,GC,, | Performed by: INTERNAL MEDICINE

## 2024-09-17 PROCEDURE — 99214 OFFICE O/P EST MOD 30 MIN: CPT | Mod: PBBFAC

## 2024-09-17 PROCEDURE — 36415 COLL VENOUS BLD VENIPUNCTURE: CPT

## 2024-09-17 PROCEDURE — 83036 HEMOGLOBIN GLYCOSYLATED A1C: CPT | Mod: PBBFAC

## 2024-09-17 PROCEDURE — 84443 ASSAY THYROID STIM HORMONE: CPT

## 2024-09-17 RX ORDER — PIOGLITAZONEHYDROCHLORIDE 30 MG/1
30 TABLET ORAL DAILY
Qty: 90 TABLET | Refills: 3 | Status: SHIPPED | OUTPATIENT
Start: 2024-09-17 | End: 2025-09-17

## 2024-09-17 NOTE — PROGRESS NOTES
[facility] ENDOCRINOLOGY OFFICE VISIT NOTE    SUBJECTIVE:      HPI: Chiquis Velásquez is a 28 y.o. yo female w/ PMH of diabetes 2/2 immunosuppressive medications, papillary thyroid carcinoma s/p hemithyroidectomy, postsurgical hypothyroidism, who presents today for F/U visit. At this time, per patient's parents, her fasting glucose was in the 60-70's and her insulin glargine was discontinued by her parents about 7 weeks ago. She was also found to have intermittent nasal congestion that started a few days ago without any coughs.     ROS:  Unable to assess d/t mental limitation     Past Medical History:   Diagnosis Date    Cancer     Diabetes mellitus, type 2     Hypothyroidism     Kidney transplanted     Stroke      Current Outpatient Medications   Medication Instructions    albuterol (PROVENTIL/VENTOLIN HFA) 90 mcg/actuation inhaler 2 puffs, Inhalation, Every 6 hours PRN    amLODIPine (NORVASC) 5 mg, Oral    aspirin 325 mg, Per G Tube    atorvastatin (LIPITOR) 20 mg, Oral, Nightly    azelastine (ASTELIN) 137 mcg (0.1 %) nasal spray USE ONE SPRAY IN EACH NOSTRIL IN THE MORNING AND BEFORE BEDTIME    cetirizine (ZYRTEC) 10 mg, Oral    FLOVENT HFA 44 mcg/actuation inhaler Inhalation    folic acid (FOLVITE) 1,000 mcg, Oral    ipratropium (ATROVENT) 42 mcg (0.06 %) nasal spray 2 sprays, Each Nostril    ketoconazole (NIZORAL) 2 % shampoo Topical, Three times weekly    lansoprazole (PREVACID SOLUTAB) 30 MG disintegrating tablet Oral    levocarnitine (CARNITOR) 100 mg/mL Soln 500 mg, Oral, 2 times daily    levothyroxine (SYNTHROID) 125 MCG tablet Take half of a 125mcg (62.5mcg) tablet daily.    LEVSIN/SL 0.125 mg, Oral, 2 times daily PRN    metoprolol tartrate (LOPRESSOR) 25 MG tablet TAKE ONE TABLET BY MOUTH IN THE MORNING AND ONE TABLET BEFORE BEDTIME    multivitamin Liqd 5 mLs, FEEDING TUBE    mupirocin (BACTROBAN) 2 % ointment APPLY TOPICALLY THREE TIMES DAILY AS NEEDED FOR REDNESS AT PEG SITE    mycophenolate mofetil  "(CELLCEPT) 500 mg, Oral    ondansetron (ZOFRAN) 8 mg, Per G Tube    pen needle, diabetic 31 gauge x 3/16" Ndle Use daily    pioglitazone (ACTOS) 30 mg, Oral, Daily    prednisoLONE (ORAPRED) 7.5 mg, Oral    SITagliptin phosphate (JANUVIA) 100 mg, Oral, Daily    tacrolimus (PROGRAF) 0.5 MG Cap take 2 tabs q am every MON, TUES, WED  TAKE 1 TAB Q PM ON MON, TUE, WED  TAKE 1 TAB BID Q TH, FR, SA & SUN    triamcinolone acetonide 0.025% (KENALOG) 0.025 % cream Topical, 2 times daily PRN    TRUEPLUS PEN NEEDLE 31 gauge x 3/16" Ndle USE DAILY AS DIRECTED    white petrolatum (AQUAPHOR HEALING) 41 % Oint Topical        OBJECTIVE:     Vital signs:   /62 (BP Location: Right forearm, Patient Position: Sitting)   Pulse 72   Resp 12   Ht 4' 1" (1.245 m)   BMI 32.21 kg/m²      Physical Examination:  General: Well nourished w/o distress in wheelchair   HEENT: NC/AT; PERRL; nasal and oral mucosa moist and clear; no thyromegaly  Pulm: CTA bilaterally, normal work of breathing  CV: S1, S2 w/o murmurs or gallops; no edema noted  GI: Soft with normal bowel sounds in all quadrants, PEG tube in left lower quadrant intact  MSK: Contracted upper extremities    ASSESSMENT & PLAN:     Post-transplant DM  -A1c trend: 9.4 (9/17/2024); 10.2 (1/19/2024)  -Patient has not been using insulin glargine 24 units for the past 7 weeks due to parents having concern of hypoglycemia (reported fasting glucose in the 60-70's); will start Pioglitazone 30mg daily and continue Januvia 100mg daily  -Currently still receiving continuous tube feeding     HX of papillary thyroid carcinoma s/p hemithyroidectomy   Postsurgical hypothyroidism  -TSH 1.44 (1/25/2024), goal TSH should be 0.1-0.5  -U/S thyroid 1/23/2024: No appreciable thyroid nodule though suboptimal exam  -Will obtain TSH level today and titrate Levothyroxine if needed    Osteopenia  -On chronic steroid therapy  -Instructed patient's parents to complete DXA ordered previously     Return to " clinic in 6 months. F/U on DXA scan and TSH.     Chepe Storm, DO   [facility] Internal Medicine, PGY-III

## 2024-09-18 NOTE — TELEPHONE ENCOUNTER
Patient mother returned called informed of TSH verbalized understanding will call with other test results once completed

## 2024-09-24 ENCOUNTER — HOSPITAL ENCOUNTER (OUTPATIENT)
Dept: RADIOLOGY | Facility: HOSPITAL | Age: 29
Discharge: HOME | End: 2024-09-24
Attending: INTERNAL MEDICINE
Payer: MEDICARE

## 2024-09-24 DIAGNOSIS — E13.9 POST-TRANSPLANT DIABETES MELLITUS: ICD-10-CM

## 2024-09-24 DIAGNOSIS — C73 THYROID CANCER: ICD-10-CM

## 2024-09-24 DIAGNOSIS — E89.0 POSTSURGICAL HYPOTHYROIDISM: ICD-10-CM

## 2024-09-24 DIAGNOSIS — M85.88 OSTEOPENIA OF SPINE: ICD-10-CM

## 2024-09-24 PROCEDURE — 77080 DXA BONE DENSITY AXIAL: CPT | Mod: TC

## 2025-01-02 ENCOUNTER — TELEPHONE (OUTPATIENT)
Dept: ENDOCRINOLOGY | Facility: CLINIC | Age: 30
End: 2025-01-02
Payer: MEDICARE

## 2025-01-02 NOTE — TELEPHONE ENCOUNTER
Dr. Hopper at [facility], called to inform patient is admitted under her care, states patient mom refuses  to  agree to plan of care, and states that patient father stops regimen she starts and continues to turn off her feeding and staff is unaware why he continues to turn patient feeding off. Dr. Hopper states any information would be helpful and she can be reached at [phone].

## 2025-01-03 NOTE — TELEPHONE ENCOUNTER
Catherine from [facility] called asking for a 2 week follow up with Dr Moreno for Dx acute cystitis, Chiquis was discharged 1/2/25.  She is already scheduled to see Dr Moreno on 4/8/25.    Spoke with Dr Moreno, he will review her hospital notes and address her ENDO needs, but she needs to see her PCP for the dx acute cystitis.   Her PCP is Stanley Prater DO at [facility].    I called DARRIUS back at [phone], to let them know pt needs to see her PCP.  Called Dr Prater's office at [phone] spoke to Lashay, she has Chiquis scheduled on 1/8/25 to see Dr Prater for hospital follow up.        Added DARRIUS notes from [facility] Everywhere.

## 2025-01-06 ENCOUNTER — TELEPHONE (OUTPATIENT)
Dept: ENDOCRINOLOGY | Facility: CLINIC | Age: 30
End: 2025-01-06
Payer: MEDICARE

## 2025-01-06 ENCOUNTER — OFFICE VISIT (OUTPATIENT)
Dept: ENDOCRINOLOGY | Facility: CLINIC | Age: 30
End: 2025-01-06
Payer: MEDICARE

## 2025-01-06 VITALS
WEIGHT: 110 LBS | HEIGHT: 55 IN | TEMPERATURE: 98 F | HEART RATE: 72 BPM | BODY MASS INDEX: 25.46 KG/M2 | DIASTOLIC BLOOD PRESSURE: 70 MMHG | SYSTOLIC BLOOD PRESSURE: 112 MMHG

## 2025-01-06 DIAGNOSIS — E13.9 POST-TRANSPLANT DIABETES MELLITUS: Primary | ICD-10-CM

## 2025-01-06 DIAGNOSIS — E89.0 HYPOTHYROIDISM, POSTSURGICAL: ICD-10-CM

## 2025-01-06 DIAGNOSIS — C73 THYROID CANCER: ICD-10-CM

## 2025-01-06 LAB — TSH SERPL-ACNC: 1.62 UIU/ML (ref 0.35–4.94)

## 2025-01-06 PROCEDURE — 84443 ASSAY THYROID STIM HORMONE: CPT

## 2025-01-06 PROCEDURE — 84432 ASSAY OF THYROGLOBULIN: CPT

## 2025-01-06 PROCEDURE — 99213 OFFICE O/P EST LOW 20 MIN: CPT | Mod: PBBFAC

## 2025-01-06 PROCEDURE — 36415 COLL VENOUS BLD VENIPUNCTURE: CPT

## 2025-01-06 RX ORDER — INSULIN GLARGINE 100 [IU]/ML
24 INJECTION, SOLUTION SUBCUTANEOUS
COMMUNITY
End: 2025-01-06

## 2025-01-06 RX ORDER — LEVOFLOXACIN 500 MG/1
500 TABLET, FILM COATED ORAL
COMMUNITY
Start: 2025-01-03 | End: 2025-01-13

## 2025-01-06 RX ORDER — INSULIN GLARGINE 100 [IU]/ML
10 INJECTION, SOLUTION SUBCUTANEOUS NIGHTLY
Qty: 15 ML | Refills: 11 | Status: SHIPPED | OUTPATIENT
Start: 2025-01-06 | End: 2026-01-06

## 2025-01-06 RX ORDER — DULAGLUTIDE 0.75 MG/.5ML
0.75 INJECTION, SOLUTION SUBCUTANEOUS
Qty: 4 PEN | Refills: 0 | Status: SHIPPED | OUTPATIENT
Start: 2025-01-06 | End: 2026-01-06

## 2025-01-06 RX ORDER — DOXYCYCLINE 100 MG/1
100 CAPSULE ORAL
COMMUNITY
Start: 2025-01-02 | End: 2025-01-12

## 2025-01-06 NOTE — PROGRESS NOTES
[facility] ENDOCRINOLOGY OFFICE VISIT NOTE    SUBJECTIVE:      HPI: Chiquis Velásquez is a 29 y.o. yo female w/ PMH of diabetes 2/2 immunosuppressive medications, papillary thyroid carcinoma s/p hemithyroidectomy, postsurgical hypothyroidism, who presents today for F/U visit.     Patient recently discharged from the hospital where she was admitted for UTI.  A1c today 10.5.  Patient was previously on long-acting insulin 24 units nightly.  Parents state that they stopped giving her insulin the past couple of days as she had glucose reading 60-70s in the morning and they were concerned that she was dropping too low.  Since discontinued this medication they report that her morning readings are now 130s.  State daytime readings also been low 100s.  She receives continuous feedings at nighttime.        ROS:  Unable to assess d/t mental limitation     Past Medical History:   Diagnosis Date    Cancer     Diabetes mellitus, type 2     Hypothyroidism     Kidney transplanted     Stroke      Current Outpatient Medications   Medication Instructions    albuterol (PROVENTIL/VENTOLIN HFA) 90 mcg/actuation inhaler 2 puffs, Every 6 hours PRN    amLODIPine (NORVASC) 5 mg    aspirin 325 mg    atorvastatin (LIPITOR) 20 mg, Nightly    azelastine (ASTELIN) 137 mcg (0.1 %) nasal spray USE ONE SPRAY IN EACH NOSTRIL IN THE MORNING AND BEFORE BEDTIME    cetirizine (ZYRTEC) 10 mg    doxycycline (VIBRAMYCIN) 100 mg    FLOVENT HFA 44 mcg/actuation inhaler Inhale into the lungs.    folic acid (FOLVITE) 1,000 mcg    ipratropium (ATROVENT) 42 mcg (0.06 %) nasal spray 2 sprays    ketoconazole (NIZORAL) 2 % shampoo Three times weekly    lansoprazole (PREVACID SOLUTAB) 30 MG disintegrating tablet Take by mouth.    LANTUS U-100 INSULIN 24 Units    levocarnitine (CARNITOR) 100 mg/mL Soln 500 mg, 2 times daily    levoFLOXacin (LEVAQUIN) 500 mg    levothyroxine (SYNTHROID) 125 MCG tablet Take half of a 125mcg (62.5mcg) tablet daily.    LEVSIN/SL 0.125 mg, 2  "times daily PRN    metoprolol tartrate (LOPRESSOR) 25 MG tablet TAKE ONE TABLET BY MOUTH IN THE MORNING AND ONE TABLET BEFORE BEDTIME    multivitamin Liqd 5 mLs    multivitamin/folic acid/dha (MULTIVITAMIN-FA-DHA ORAL) 5 mLs    mupirocin (BACTROBAN) 2 % ointment APPLY TOPICALLY THREE TIMES DAILY AS NEEDED FOR REDNESS AT PEG SITE    mycophenolate mofetil (CELLCEPT) 500 mg    ondansetron (ZOFRAN) 8 mg    pen needle, diabetic 31 gauge x 3/16" Ndle Use daily    pioglitazone (ACTOS) 30 mg, Oral, Daily    prednisoLONE (ORAPRED) 7.5 mg    psyllium husk, aspartame, (METAMUCIL) 3.4 gram PwPk packet 1 packet, 2 times daily PRN    SITagliptin phosphate (JANUVIA) 100 mg, Oral, Daily    tacrolimus (PROGRAF) 0.5 MG Cap take 2 tabs q am every MON, TUES, WED  TAKE 1 TAB Q PM ON MON, TUE, WED  TAKE 1 TAB BID Q TH, FR, SA & SUN    triamcinolone acetonide 0.025% (KENALOG) 0.025 % cream 2 times daily PRN    TRUEPLUS PEN NEEDLE 31 gauge x 3/16" Ndle USE DAILY AS DIRECTED    white petrolatum (AQUAPHOR HEALING) 41 % Oint Apply topically.        OBJECTIVE:     Vital signs:   /70 (BP Location: Right arm, Patient Position: Sitting)   Pulse 72   Temp 97.6 °F (36.4 °C) (Axillary)   Ht 4' 1" (1.245 m)   Wt 49.9 kg (110 lb 0.2 oz)   BMI 32.21 kg/m²      Physical Examination:  General: Well nourished w/o distress in wheelchair   HEENT: NC/AT; nasal and oral mucosa moist and clear  Pulm: CTA bilaterally, normal work of breathing  CV: S1, S2 w/o murmurs or gallops; no edema noted  GI: Soft with normal bowel sounds in all quadrants, PEG tube in left lower quadrant intact  MSK: Contracted upper extremities    ASSESSMENT & PLAN:     Post-transplant DM  -A1c trend: 9.4 (9/17/2024); 10.2 (1/19/2024); 10.5 (12/26/2024)  -Patient has not been using insulin glargine 24 units for the past 2 days due to parents having concern of hypoglycemia (reported fasting glucose in the 60-70's).  -current regimen:  Pioglitazone 30 mg, Januvia 100 mg, glargine " 24 units nightly.  -prescribed Dexcom to get accurate glucose readings.    -discontinue pioglitazone.    -start Trulicity 0.75 g weekly.  -continue with Januvia 100 mg, Lantus 10 units nightly.  Plans to increase dosage with goal morning glucose < 130.    HX of papillary thyroid carcinoma s/p hemithyroidectomy   Postsurgical hypothyroidism  -TSH 0.257 (12/26/2024), goal TSH should be 0.1-0.5  -U/S thyroid 1/23/2024: No appreciable thyroid nodule though suboptimal exam  -repeat TSH, thyroglobulin ordered.    Osteopenia  -On chronic steroid therapy  -Instructed patient's parents to complete DXA ordered previously       Return to clinic in 3-4 months      Sekou William MD   [facility] Internal Medicine PGY 2

## 2025-01-07 LAB
ENDOCRINOLOGIST REVIEW: ABNORMAL
THYROGLOB AB SERPL IA-ACNC: <1.8 IU/ML
THYROGLOB SERPL-MCNC: 2.2 NG/ML

## 2025-01-08 DIAGNOSIS — E13.9 POST-TRANSPLANT DIABETES MELLITUS: Primary | ICD-10-CM

## 2025-01-08 RX ORDER — BLOOD-GLUCOSE SENSOR
EACH MISCELLANEOUS
Qty: 3 EACH | Refills: 11 | Status: SHIPPED | OUTPATIENT
Start: 2025-01-08

## 2025-01-09 NOTE — TELEPHONE ENCOUNTER
Spoke with patient's mom, notified about results and plan, pt's mom wants to double check if it's ok for Chiquis to take Trulicity due to her thyroid cancer history, explained she had papillary thyroid cancer and is contraindicated in pt's with medullary thyroid cancer history, she still wants to double check with you, please advise.

## 2025-01-09 NOTE — TELEPHONE ENCOUNTER
Labs are back and were acceptable, suspect TSH will go down with wt on trulicity.    Proceed with plan as per visit for med changes, dexcom and will continue to follow and regroup at f/u.    Remind them that the trulicy has to be titrated with each fill so we can get the doses up to get maximum efficacy.

## 2025-01-09 NOTE — TELEPHONE ENCOUNTER
Yes, trulicity is safe to take with a hx of papillary thyroid cancer but unsafe to take with a hx of medullary thyroid cancer.

## 2025-02-13 DIAGNOSIS — C73 THYROID CANCER: ICD-10-CM

## 2025-02-13 DIAGNOSIS — E13.9 POST-TRANSPLANT DIABETES MELLITUS: ICD-10-CM

## 2025-02-13 DIAGNOSIS — E89.0 HYPOTHYROIDISM, POSTSURGICAL: ICD-10-CM

## 2025-02-13 RX ORDER — DULAGLUTIDE 0.75 MG/.5ML
0.75 INJECTION, SOLUTION SUBCUTANEOUS
Qty: 4 PEN | Refills: 2 | Status: SHIPPED | OUTPATIENT
Start: 2025-02-13 | End: 2026-02-13

## 2025-03-09 DIAGNOSIS — E13.9 POST-TRANSPLANT DIABETES MELLITUS: Primary | ICD-10-CM

## 2025-03-10 NOTE — TELEPHONE ENCOUNTER
Chart reviewed. Pt to be seen next mo in endocrine clinic. Will hold off refilling Januvia as patient was recently started on Trulicity.

## 2025-03-15 NOTE — TELEPHONE ENCOUNTER
Received form from [facility] clarifying med changes from visit 1/25.  Visit shows we continued lantus.  HH form shows a substitution from lantus to basaglar.  Our rx data suggests pt is using lantus not basaglar.  Please sort which is correct and will update documentation ( vs epic chart) accordingly.

## 2025-03-28 NOTE — PT/OT/SLP EVAL
Patient arrives to infusion center for D5 C3 Vidaza today. Patient reports feeling well. Labs reviewed from 3/20 but not required for treatment today. Premedicated as per orders. Patient resting on recliner chair, call bell within reach.    Physical Therapy Evaluation & Discharge Note    Patient Name:  Chiquis Velásquez   MRN:  [id number]    Recommendations     Therapy Intensity Recommendations at Discharge: No Therapy Indicated  Discharge Equipment Recommendations: none   Equipment to be obtained for discharge: none.  Barriers to discharge: severity of deficits and impaired cognitive status    Assessment     Chiquis Velásquez is a 28 y.o. female admitted with a medical diagnosis of Bacteremia.    She presents with the following impairments/functional limitations:  weakness, impaired endurance, impaired sensation, impaired self care skills, impaired functional mobility, impaired cognition.    Patient discharged to 'in-house' with assisted  Patient's current level of function is at baseline (PLOF).  PT Services not warranted at this time.    Recent Surgery: * No surgery found *      Plan     Patient discharged from acute PT Services on 01/21/24.    Based on current functional levels observed, patient does not require further acute PT services.    Re-consult PT Services if patient's status changes and therapy services warranted.    Subjective     Communicated with patient's nurse selene prior to session.    Patient agreeable to participate in evaluation.     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 Patient was total assist with all bed mobility, transfers and wc, at home prior to hospitalization  Patient/Family Comments/goals: to return home  Pain/Comfort:  Pain Rating 1: 0/10    Patients cultural, spiritual, Temple conflicts given the current situation: no    Social History  Living Environment: Patient lives alone in a single level home, with no steps, with tub-shower combo.  Functional Level: Prior to admission patient required assistance with ADLs including mobility (bed-transfer-ambulation), eating, dressing, bathing, showering, and toileting, required assistance with IADL's  including meal prep and medication, and was wheelchair bound.  Equipment Used at Home: wheelchair, hospital bed, other (see comments) (vishal)  Equipment owned (not currently used): none.  Assistance Upon Discharge: family.    Objective     Patient found supine in bed with PEG Tube  upon PT entry to room.    General Precautions: Standard, fall   Orthopedic Precautions:    Braces:    Respiratory Status: room air        Exams:  Orientation: Patient is oriented to person, place, time, situation  Commands: Patient does not follow commands  RLE ROM: WFL  RLE Strength: Deficits: 1/5  LLE ROM: WFL  LLE Strength: Deficits: 1/5    Functional Mobility:    Bed Mobility:  Rolling Left: dependence   Rolling Right: dependence     Transfers:  Bed to chair transfer with total dependence      Additional Treatment Session  n/a    Patient left supine in bed with all lines intact, call button in reach, tray table at bedside, patient' nurse notified, and father present.    Education     White board updated regarding patient's safest level of mobility with staff assistance.    Goals - No STG's or LTG's established secondary to patient was seen for evaluation and discharge     Multidisciplinary Problems       Physical Therapy Goals          Problem: Physical Therapy    Goal Priority Disciplines Outcome Goal Variances Interventions   Physical Therapy Goal     PT, PT/OT                          History     Past Medical History:   Diagnosis Date    Cancer     Diabetes mellitus, type 2     Hypothyroidism     Kidney transplanted     Stroke      No past surgical history on file.  Time Tracking     PT Received On: 01/21/24  PT Start Time: 0855     PT Stop Time: 0910  PT Total Time (min): 15 min     Billable Minutes: Evaluation 15    01/21/2024

## 2025-04-15 ENCOUNTER — TELEPHONE (OUTPATIENT)
Dept: ENDOCRINOLOGY | Facility: CLINIC | Age: 30
End: 2025-04-15
Payer: MEDICARE

## 2025-04-15 NOTE — TELEPHONE ENCOUNTER
Phoned [facility] spoke with Lori, informed I do not have orders for Dr. Moreno to sign in regarding Chiquis.  She states will refax, I provided her with  fax number and aske dto send to my attention.

## 2025-04-15 NOTE — TELEPHONE ENCOUNTER
----- Message from Delmi sent at 4/14/2025  3:50 PM CDT -----  [facility] called stating they are currently waiting on an order that needs to be signed and returned.Maritza stated this was faxed on 1/9/25.[facility]    [phone] [phone],

## 2025-06-24 ENCOUNTER — LAB VISIT (OUTPATIENT)
Dept: LAB | Facility: HOSPITAL | Age: 30
End: 2025-06-24
Attending: INTERNAL MEDICINE
Payer: MEDICARE

## 2025-06-24 ENCOUNTER — OFFICE VISIT (OUTPATIENT)
Dept: ENDOCRINOLOGY | Facility: CLINIC | Age: 30
End: 2025-06-24
Payer: MEDICARE

## 2025-06-24 ENCOUNTER — TELEPHONE (OUTPATIENT)
Dept: ENDOCRINOLOGY | Facility: CLINIC | Age: 30
End: 2025-06-24

## 2025-06-24 VITALS
DIASTOLIC BLOOD PRESSURE: 80 MMHG | BODY MASS INDEX: 25.52 KG/M2 | HEART RATE: 99 BPM | WEIGHT: 110.25 LBS | HEIGHT: 55 IN | RESPIRATION RATE: 19 BRPM | SYSTOLIC BLOOD PRESSURE: 133 MMHG | TEMPERATURE: 98 F

## 2025-06-24 DIAGNOSIS — M85.9 LOW BONE DENSITY FOR AGE: ICD-10-CM

## 2025-06-24 DIAGNOSIS — E89.0 HYPOTHYROIDISM, POSTSURGICAL: ICD-10-CM

## 2025-06-24 DIAGNOSIS — C73 THYROID CANCER: ICD-10-CM

## 2025-06-24 DIAGNOSIS — E13.9 POST-TRANSPLANT DIABETES MELLITUS: ICD-10-CM

## 2025-06-24 DIAGNOSIS — E13.9 POST-TRANSPLANT DIABETES MELLITUS: Primary | ICD-10-CM

## 2025-06-24 LAB
25(OH)D3+25(OH)D2 SERPL-MCNC: 46 NG/ML (ref 30–80)
CHOLEST SERPL-MCNC: 102 MG/DL
CHOLEST/HDLC SERPL: 3 {RATIO} (ref 0–5)
HDLC SERPL-MCNC: 31 MG/DL (ref 35–60)
LDLC SERPL CALC-MCNC: 46 MG/DL (ref 50–140)
TRIGL SERPL-MCNC: 127 MG/DL (ref 37–140)
TSH SERPL-ACNC: 0.41 UIU/ML (ref 0.35–4.94)
VLDLC SERPL CALC-MCNC: 25 MG/DL

## 2025-06-24 PROCEDURE — 99215 OFFICE O/P EST HI 40 MIN: CPT | Mod: PBBFAC

## 2025-06-24 PROCEDURE — 80061 LIPID PANEL: CPT

## 2025-06-24 PROCEDURE — 82306 VITAMIN D 25 HYDROXY: CPT

## 2025-06-24 PROCEDURE — 84443 ASSAY THYROID STIM HORMONE: CPT

## 2025-06-24 PROCEDURE — 36415 COLL VENOUS BLD VENIPUNCTURE: CPT

## 2025-06-24 RX ORDER — METFORMIN HYDROCHLORIDE 500 MG/1
500 TABLET ORAL 2 TIMES DAILY WITH MEALS
COMMUNITY

## 2025-06-24 NOTE — PROGRESS NOTES
[facility] ENDOCRINOLOGY OFFICE VISIT NOTE    SUBJECTIVE:      HPI: Chiquis Velásquez is a 29 y.o. yo female w/ PMH of diabetes 2/2 immunosuppressive medications, papillary thyroid carcinoma s/p hemithyroidectomy, postsurgical hypothyroidism, who presents today for F/U visit.     Patient is accompanied by her parents. Patient recently went to the ED about 6 weeks ago due to her DEXCOM showing 40s readings. In the ED, she was in the 100s. During manual checks, she is in the 100s at home.  A1c today 6.9. She is no longer on Trulicity due to intolerance of the medication. She receives continuous feedings at nighttime. She is also on steroids daily. Her mom states the patient is having more hair fall out. She has never had a diabetic eye exam or foot exam. Patient is having regular menstrual cycles.             ROS:  Unable to assess d/t mental limitation     Past Medical History:   Diagnosis Date    Cancer     Diabetes mellitus, type 2     Hypothyroidism     Kidney transplanted     Stroke      Current Outpatient Medications   Medication Instructions    albuterol (PROVENTIL/VENTOLIN HFA) 90 mcg/actuation inhaler 2 puffs, Every 6 hours PRN    amLODIPine (NORVASC) 5 mg    aspirin 325 mg    atorvastatin (LIPITOR) 20 mg, Nightly    azelastine (ASTELIN) 137 mcg (0.1 %) nasal spray USE ONE SPRAY IN EACH NOSTRIL IN THE MORNING AND BEFORE BEDTIME    blood-glucose sensor (DEXCOM G7 SENSOR) Nan Change sensor every 10 days    cetirizine (ZYRTEC) 10 mg    FLOVENT HFA 44 mcg/actuation inhaler Inhale into the lungs.    folic acid (FOLVITE) 1,000 mcg    ipratropium (ATROVENT) 42 mcg (0.06 %) nasal spray 2 sprays    ketoconazole (NIZORAL) 2 % shampoo Three times weekly    lansoprazole (PREVACID SOLUTAB) 30 MG disintegrating tablet Take by mouth.    LANTUS SOLOSTAR U-100 INSULIN 10 Units, Subcutaneous, Nightly    levocarnitine (CARNITOR) 100 mg/mL Soln 500 mg, 2 times daily    levothyroxine (SYNTHROID) 125 MCG tablet Take half of a  "125mcg (62.5mcg) tablet daily.    LEVSIN/SL 0.125 mg, 2 times daily PRN    metoprolol tartrate (LOPRESSOR) 25 MG tablet TAKE ONE TABLET BY MOUTH IN THE MORNING AND ONE TABLET BEFORE BEDTIME    multivitamin Liqd 5 mLs    multivitamin/folic acid/dha (MULTIVITAMIN-FA-DHA ORAL) 5 mLs    mupirocin (BACTROBAN) 2 % ointment APPLY TOPICALLY THREE TIMES DAILY AS NEEDED FOR REDNESS AT PEG SITE    mycophenolate mofetil (CELLCEPT) 500 mg    ondansetron (ZOFRAN) 8 mg    pen needle, diabetic 31 gauge x 3/16" Ndle Use daily    prednisoLONE (ORAPRED) 7.5 mg    tacrolimus (PROGRAF) 0.5 MG Cap take 2 tabs q am every MON, TUES, WED  TAKE 1 TAB Q PM ON MON, TUE, WED  TAKE 1 TAB BID Q TH, FR, SA & SUN    triamcinolone acetonide 0.025% (KENALOG) 0.025 % cream 2 times daily PRN    TRUEPLUS PEN NEEDLE 31 gauge x 3/16" Ndle USE DAILY AS DIRECTED    TRULICITY 0.75 mg, Subcutaneous, Every 7 days    white petrolatum (AQUAPHOR HEALING) 41 % Oint Apply topically.        OBJECTIVE:     Vital signs:   There were no vitals taken for this visit.     Physical Examination:  General: Well nourished w/o distress in wheelchair   HEENT: NC/AT; nasal and oral mucosa moist and clear  Pulm: CTA bilaterally, normal work of breathing  CV: S1, S2 w/o murmurs or gallops; no edema noted  GI: Soft with normal bowel sounds in all quadrants, PEG tube in left lower quadrant intact  MSK: Contracted upper extremities    Protective Sensation (w/ 10 gram monofilament):  Right: unable to assess due to patient's mental limitation  Left: unable to assess due to patient's mental limitation    Visual Inspection:  Normal -  Bilateral, Nails Intact - without Evidence of Foot Deformity- Bilateral, Ulceration -  Neither, and Skin Breakdown -  Neither            ASSESSMENT & PLAN:     Post-renal transplant DM  -A1c trend: 9.4 (9/17/2024); 10.2 (1/19/2024); 10.5 (12/26/2024)  -Patient has not been using insulin glargine 24 units for the past 2 days due to parents having concern of " hypoglycemia (reported fasting glucose in the 60-70's).  -current regimen:   Januvia 50 mg, glargine 24 units nightly, metformin 500mg  -prescribed Dexcom to get accurate glucose readings.    -discontinue pioglitazone.    -previously was on Trulicity 0.75 g weekly but could not tolerate and is discontinued  -Will increase Januvia to 100 mg, Lantus 10 units nightly.  Plans to increase dosage with goal morning glucose < 130.    HX of papillary thyroid carcinoma s/p hemithyroidectomy   Postsurgical hypothyroidism  -TSH 1.619 (1/6/2025), goal TSH should be 0.1-0.5  - thyroglobulin ab <1.8, tumor marker 2.2 (1/6/2025)  -U/S thyroid 1/23/2024: No appreciable thyroid nodule though suboptimal exam  -repeat TSH and thyroid US ordered    Osteoporosis   -On chronic steroid therapy  - 9/24/2024: showed osteoporosis.   - patient is not post menopausal and at this time will not initiate bisphosphonate therapy  - will check Vit D      Return to clinic in 3 months      Lionel Brown,   Internal Medicine - PGY-1

## 2025-06-24 NOTE — TELEPHONE ENCOUNTER
Labs are back and acceptable.  Await u/s to sort next steps.  For now continue plan per visit.  Ok to notify.

## 2025-06-27 NOTE — TELEPHONE ENCOUNTER
Spoke with mother informed labs are back and acceptable, waiting for ultrasound results to sort nest steps.  Voiced her understanding